# Patient Record
Sex: MALE | Race: WHITE | HISPANIC OR LATINO | Employment: FULL TIME | URBAN - NONMETROPOLITAN AREA
[De-identification: names, ages, dates, MRNs, and addresses within clinical notes are randomized per-mention and may not be internally consistent; named-entity substitution may affect disease eponyms.]

---

## 2017-01-27 RX ORDER — INSULIN ASPART 100 [IU]/ML
INJECTION, SOLUTION INTRAVENOUS; SUBCUTANEOUS
Qty: 3 PEN | Refills: 11 | Status: SHIPPED | OUTPATIENT
Start: 2017-01-27 | End: 2017-02-01 | Stop reason: SDUPTHER

## 2017-02-24 ENCOUNTER — OFFICE VISIT (OUTPATIENT)
Dept: INTERNAL MEDICINE | Facility: CLINIC | Age: 62
End: 2017-02-24

## 2017-02-24 VITALS
HEIGHT: 64 IN | HEART RATE: 116 BPM | SYSTOLIC BLOOD PRESSURE: 130 MMHG | BODY MASS INDEX: 25.44 KG/M2 | DIASTOLIC BLOOD PRESSURE: 86 MMHG | OXYGEN SATURATION: 98 % | WEIGHT: 149 LBS | TEMPERATURE: 97.6 F

## 2017-02-24 DIAGNOSIS — R05.9 COUGH: ICD-10-CM

## 2017-02-24 DIAGNOSIS — J18.9 PNEUMONIA OF BOTH LOWER LOBES DUE TO INFECTIOUS ORGANISM: Primary | ICD-10-CM

## 2017-02-24 LAB
EXPIRATION DATE: NORMAL
FLUAV AG NPH QL: NEGATIVE
FLUBV AG NPH QL: NEGATIVE
INTERNAL CONTROL: NORMAL
Lab: NORMAL

## 2017-02-24 PROCEDURE — 99213 OFFICE O/P EST LOW 20 MIN: CPT | Performed by: PHYSICIAN ASSISTANT

## 2017-02-24 PROCEDURE — 87804 INFLUENZA ASSAY W/OPTIC: CPT | Performed by: PHYSICIAN ASSISTANT

## 2017-02-24 RX ORDER — AZITHROMYCIN 250 MG/1
TABLET, FILM COATED ORAL
Qty: 6 TABLET | Refills: 0 | Status: SHIPPED | OUTPATIENT
Start: 2017-02-24 | End: 2018-02-15

## 2017-02-24 RX ORDER — BENZONATATE 200 MG/1
200 CAPSULE ORAL 3 TIMES DAILY PRN
Qty: 30 CAPSULE | Refills: 0 | Status: SHIPPED | OUTPATIENT
Start: 2017-02-24 | End: 2018-02-15

## 2017-02-24 NOTE — PROGRESS NOTES
Pascual Kaur is a 61 y.o. male.     Subjective   History of Present Illness   Here today with report of cough for 2 weeks with intermittent fever. Getting SOA with coughing fits. Has also had sore throat, headaches, body aches, chest soreness and nausea.       The following portions of the patient's history were reviewed and updated as appropriate: allergies, current medications, past family history, past medical history, past social history, past surgical history and problem list.    Review of Systems    Constitutional: Fever. Fatigue. Negative for appetite change, chills and unexpected weight change.   HENT: sore throat, congestion. Negative forear pain, hearing loss, nosebleeds, postnasal drip, rhinorrhea,  tinnitus and trouble swallowing.    Eyes: Negative for photophobia, discharge and visual disturbance.   Respiratory: cough, SOA with coughing fits. Negative for chest tightness and wheezing.    Cardiovascular: Negative for chest pain, palpitations and leg swelling.   Gastrointestinal: Negative for abdominal distention, abdominal pain, blood in stool, constipation, diarrhea, nausea and vomiting.   Endocrine: Negative for cold intolerance, heat intolerance, polydipsia, polyphagia and polyuria.   Musculoskeletal: Negative for arthralgias, back pain, joint swelling, myalgias, neck pain and neck stiffness.   Skin: Negative for color change, pallor, rash and wound.   Allergic/Immunologic: Negative for environmental allergies, food allergies and immunocompromised state.   Neurological: Headaches. Negative for dizziness, tremors, seizures, weakness, numbness.  Hematological: Negative for adenopathy. Does not bruise/bleed easily.   Psychiatric/Behavioral: Negative for agitation, behavioral problems, confusion, hallucinations, self-injury and suicidal ideas. The patient is not nervous/anxious.      Objective    Physical Exam  Constitutional: Oriented to person, place, and time. Appears well-developed and  "well-nourished.   HENT: OP normal. TMs normal.   Head: Normocephalic and atraumatic.   Eyes: EOM are normal. Pupils are equal, round, and reactive to light.   Neck: Normal range of motion. Neck supple.   Cardiovascular: Normal rate, regular rhythm and normal heart sounds.    Pulmonary/Chest: Coarse breath sounds with wheezing in apices and few coarse crackles bilateral bases. Effort normal.  No respiratory distress.  Has no rales. Exhibits no chest wall tenderness.   Abdominal: Soft. Bowel sounds are normal. Exhibits no distension and no mass. There is no tenderness.   Musculoskeletal: Normal range of motion. Exhibits no tenderness.   Neurological: Alert and oriented to person, place, and time.   Skin: Skin is warm and dry.   Psychiatric: Has a normal mood and affect. Behavior is normal. Judgment and thought content normal.       Visit Vitals   • /86   • Pulse 116   • Temp 97.6 °F (36.4 °C)   • Ht 64\" (162.6 cm)   • Wt 149 lb (67.6 kg)   • SpO2 98%   • BMI 25.58 kg/m2       Nursing note and vitals reviewed.        Assessment/Plan   Pascual was seen today for cough and fever.    Diagnoses and all orders for this visit:    Pneumonia of both lower lobes due to infectious organism  -     azithromycin (ZITHROMAX Z-DAVID) 250 MG tablet; Take 2 tablets the first day, then 1 tablet daily for 4 days.  -     benzonatate (TESSALON) 200 MG capsule; Take 1 capsule by mouth 3 (Three) Times a Day As Needed for cough.    Dulera 200 mcg/5mcg sample inhaler provided with instructions on use and since/spit after each use. Use for 1 week. Call Tuesday if no improvement.   Influenza A&B negative.            "

## 2017-02-28 ENCOUNTER — TELEPHONE (OUTPATIENT)
Dept: INTERNAL MEDICINE | Facility: CLINIC | Age: 62
End: 2017-02-28

## 2017-02-28 NOTE — TELEPHONE ENCOUNTER
----- Message from eRina Sanders sent at 2/28/2017  3:08 PM EST -----  Contact: Patient   Patient called the office requesting to see if Maura could write him an work excuse for this past Sunday. He said that he was seen Friday and did not improve. You can contact them back at [phone].

## 2017-07-11 RX ORDER — INSULIN DETEMIR 100 [IU]/ML
INJECTION, SOLUTION SUBCUTANEOUS
Qty: 7 PEN | Refills: 3 | Status: SHIPPED | OUTPATIENT
Start: 2017-07-11 | End: 2018-01-04 | Stop reason: SDUPTHER

## 2017-07-13 ENCOUNTER — TELEPHONE (OUTPATIENT)
Dept: INTERNAL MEDICINE | Facility: CLINIC | Age: 62
End: 2017-07-13

## 2017-07-13 RX ORDER — OMEPRAZOLE 40 MG/1
40 CAPSULE, DELAYED RELEASE ORAL DAILY
Qty: 30 CAPSULE | Refills: 11 | Status: SHIPPED | OUTPATIENT
Start: 2017-07-13 | End: 2018-08-30 | Stop reason: SDUPTHER

## 2018-02-08 RX ORDER — INSULIN DETEMIR 100 [IU]/ML
INJECTION, SOLUTION SUBCUTANEOUS
Refills: 0 | OUTPATIENT
Start: 2018-02-08

## 2018-02-13 RX ORDER — INSULIN DETEMIR 100 [IU]/ML
INJECTION, SOLUTION SUBCUTANEOUS
Qty: 1 PEN | Refills: 0 | Status: SHIPPED | OUTPATIENT
Start: 2018-02-13 | End: 2018-02-15 | Stop reason: SDUPTHER

## 2018-02-15 ENCOUNTER — OFFICE VISIT (OUTPATIENT)
Dept: INTERNAL MEDICINE | Facility: CLINIC | Age: 63
End: 2018-02-15

## 2018-02-15 VITALS
OXYGEN SATURATION: 97 % | BODY MASS INDEX: 25.78 KG/M2 | TEMPERATURE: 97.6 F | WEIGHT: 151 LBS | SYSTOLIC BLOOD PRESSURE: 120 MMHG | HEIGHT: 64 IN | DIASTOLIC BLOOD PRESSURE: 80 MMHG | HEART RATE: 119 BPM

## 2018-02-15 DIAGNOSIS — J41.0 SIMPLE CHRONIC BRONCHITIS (HCC): Primary | ICD-10-CM

## 2018-02-15 DIAGNOSIS — E10.42 TYPE 1 DIABETES MELLITUS WITH DIABETIC POLYNEUROPATHY (HCC): ICD-10-CM

## 2018-02-15 DIAGNOSIS — Z23 NEED FOR VACCINATION: ICD-10-CM

## 2018-02-15 DIAGNOSIS — C34.11 MALIGNANT NEOPLASM OF UPPER LOBE OF RIGHT LUNG (HCC): ICD-10-CM

## 2018-02-15 PROCEDURE — 90471 IMMUNIZATION ADMIN: CPT | Performed by: INTERNAL MEDICINE

## 2018-02-15 PROCEDURE — 90662 IIV NO PRSV INCREASED AG IM: CPT | Performed by: INTERNAL MEDICINE

## 2018-02-15 PROCEDURE — 99214 OFFICE O/P EST MOD 30 MIN: CPT | Performed by: INTERNAL MEDICINE

## 2018-02-15 NOTE — PROGRESS NOTES
"Subjective  Pascual Kaur is a 62 y.o. male    HPI coming in for follow-up he is accompanied by his wife was giving us some of the history. Very poor compliance with follow-ups here does not take his short-acting insulin. Is only taking Levemir last A1c was more than 11. He denies polyuria polydipsia. Has had history of lung CA now in remission. No alcohol or tobacco use    The following portions of the patient's history were reviewed and updated as appropriate: allergies, current medications, past family history, past medical history, past social history, past surgical history, and problem list.     Review of Systems   Constitutional: Negative.  Negative for activity change, appetite change, fatigue and fever.   HENT: Negative for congestion, ear discharge, ear pain and trouble swallowing.    Eyes: Negative for photophobia and visual disturbance.   Respiratory: Positive for cough and shortness of breath.    Cardiovascular: Negative for chest pain and palpitations.   Gastrointestinal: Negative for abdominal distention, abdominal pain, constipation, diarrhea, nausea and vomiting.   Endocrine: Negative.    Genitourinary: Negative for dysuria, hematuria and urgency.   Musculoskeletal: Positive for arthralgias. Negative for back pain, joint swelling and myalgias.   Skin: Negative for color change and rash.   Allergic/Immunologic: Negative.    Neurological: Positive for numbness. Negative for dizziness, weakness, light-headedness and headaches.   Hematological: Negative for adenopathy. Does not bruise/bleed easily.   Psychiatric/Behavioral: Negative for agitation, confusion and dysphoric mood. The patient is not nervous/anxious.        Visit Vitals   • /80   • Pulse 119   • Temp 97.6 °F (36.4 °C)   • Ht 162.6 cm (64\")   • Wt 68.5 kg (151 lb)   • SpO2 97%   • BMI 25.92 kg/m2       Objective  Physical Exam   Constitutional: He is oriented to person, place, and time. He appears well-developed and well-nourished. " No distress.   HENT:   Nose: Nose normal.   Mouth/Throat: Oropharynx is clear and moist.   Eyes: Conjunctivae and EOM are normal. No scleral icterus.   Neck: No tracheal deviation present. No thyromegaly present.   Cardiovascular: Normal rate and regular rhythm.  Exam reveals no friction rub.    No murmur heard.  Pulmonary/Chest: No respiratory distress. He has no wheezes. He has no rales.   Abdominal: Soft. He exhibits no distension and no mass. There is no tenderness. There is no guarding.   Musculoskeletal: Normal range of motion. He exhibits deformity.    Pascual had a diabetic foot exam performed today.    Vascular Status -  His exam exhibits right foot vasculature normal. His exam exhibits no right foot edema. His exam exhibits left foot vasculature normal. His exam exhibits no left foot edema.   Skin Integrity  -  His right foot skin is intact.     Pascual 's left foot skin is intact. .  Lymphadenopathy:     He has no cervical adenopathy.   Neurological: He is alert and oriented to person, place, and time. He has normal reflexes. No cranial nerve deficit. Coordination normal.   Skin: Skin is warm and dry. No rash noted. No erythema.   Psychiatric: He has a normal mood and affect. His behavior is normal. Judgment and thought content normal.       Diagnoses and all orders for this visit:    Simple chronic bronchitis stable continue with albuterol when necessary only no recent exacerbation    Malignant neoplasm of upper lobe of right lung stable symptom-free    Type 1 diabetes mellitus with diabetic polyneuropathy counseled in detail check lab work discussed need to take rapid acting insulin with meals may need to titrate dose of Levemir also. Prescription called in follow Accu-Cheks at home

## 2018-02-16 LAB
ALBUMIN SERPL-MCNC: 4.4 G/DL (ref 3.5–5)
ALBUMIN/GLOB SERPL: 1.3 G/DL (ref 1–2)
ALP SERPL-CCNC: 346 U/L (ref 38–126)
ALT SERPL-CCNC: 42 U/L (ref 13–69)
AST SERPL-CCNC: 29 U/L (ref 15–46)
BILIRUB SERPL-MCNC: 0.5 MG/DL (ref 0.2–1.3)
BUN SERPL-MCNC: 16 MG/DL (ref 7–20)
BUN/CREAT SERPL: 20 (ref 6.3–21.9)
CALCIUM SERPL-MCNC: 10 MG/DL (ref 8.4–10.2)
CHLORIDE SERPL-SCNC: 97 MMOL/L (ref 98–107)
CO2 SERPL-SCNC: 23 MMOL/L (ref 26–30)
CREAT SERPL-MCNC: 0.8 MG/DL (ref 0.6–1.3)
GFR SERPLBLD CREATININE-BSD FMLA CKD-EPI: 119 ML/MIN/1.73
GFR SERPLBLD CREATININE-BSD FMLA CKD-EPI: 98 ML/MIN/1.73
GLOBULIN SER CALC-MCNC: 3.5 GM/DL
GLUCOSE SERPL-MCNC: 442 MG/DL (ref 74–98)
HBA1C MFR BLD: 12.6 %
LDLC SERPL DIRECT ASSAY-MCNC: 133 MG/DL (ref 0–99)
POTASSIUM SERPL-SCNC: 4.8 MMOL/L (ref 3.5–5.1)
PROT SERPL-MCNC: 7.9 G/DL (ref 6.3–8.2)
SODIUM SERPL-SCNC: 137 MMOL/L (ref 137–145)
UNABLE TO VOID: NORMAL

## 2018-02-16 RX ORDER — ATENOLOL 25 MG/1
25 TABLET ORAL DAILY
Qty: 90 TABLET | Refills: 3 | Status: SHIPPED | OUTPATIENT
Start: 2018-02-16 | End: 2019-04-02 | Stop reason: SDUPTHER

## 2018-02-16 RX ORDER — ATORVASTATIN CALCIUM 20 MG/1
20 TABLET, FILM COATED ORAL DAILY
Qty: 90 TABLET | Refills: 3 | Status: SHIPPED | OUTPATIENT
Start: 2018-02-16 | End: 2019-04-02 | Stop reason: SDUPTHER

## 2018-03-13 ENCOUNTER — TELEPHONE (OUTPATIENT)
Dept: INTERNAL MEDICINE | Facility: CLINIC | Age: 63
End: 2018-03-13

## 2018-03-13 DIAGNOSIS — E10.42 TYPE 1 DIABETES MELLITUS WITH DIABETIC POLYNEUROPATHY (HCC): Primary | ICD-10-CM

## 2018-05-21 ENCOUNTER — OFFICE VISIT (OUTPATIENT)
Dept: INTERNAL MEDICINE | Facility: CLINIC | Age: 63
End: 2018-05-21

## 2018-05-21 VITALS
SYSTOLIC BLOOD PRESSURE: 120 MMHG | DIASTOLIC BLOOD PRESSURE: 70 MMHG | HEIGHT: 64 IN | HEART RATE: 100 BPM | BODY MASS INDEX: 26.8 KG/M2 | OXYGEN SATURATION: 98 % | WEIGHT: 157 LBS | TEMPERATURE: 97.6 F

## 2018-05-21 DIAGNOSIS — C34.11 MALIGNANT NEOPLASM OF UPPER LOBE OF RIGHT LUNG (HCC): Primary | ICD-10-CM

## 2018-05-21 DIAGNOSIS — E10.42 TYPE 1 DIABETES MELLITUS WITH DIABETIC POLYNEUROPATHY (HCC): ICD-10-CM

## 2018-05-21 DIAGNOSIS — J41.0 SIMPLE CHRONIC BRONCHITIS (HCC): ICD-10-CM

## 2018-05-21 LAB — HBA1C MFR BLD: 11.8 %

## 2018-05-21 PROCEDURE — 99214 OFFICE O/P EST MOD 30 MIN: CPT | Performed by: INTERNAL MEDICINE

## 2018-05-21 RX ORDER — MELOXICAM 15 MG/1
15 TABLET ORAL DAILY PRN
Qty: 30 TABLET | Refills: 2 | Status: SHIPPED | OUTPATIENT
Start: 2018-05-21 | End: 2019-07-28 | Stop reason: SDUPTHER

## 2018-05-21 NOTE — PROGRESS NOTES
"Subjective  Pascual Kaur is a 62 y.o. male    HPI coming in for follow-up he is accompanied by his wife was giving us some of the history patient with lung CA currently stable poorly controlled diabetes on insulin regimen. Has hypertension with dyslipidemia no new complaints    The following portions of the patient's history were reviewed and updated as appropriate: allergies, current medications, past family history, past medical history, past social history, past surgical history, and problem list.     Review of Systems   Constitutional: Positive for fatigue. Negative for activity change, appetite change and fever.   HENT: Negative for congestion, ear discharge, ear pain and trouble swallowing.    Eyes: Negative for photophobia and visual disturbance.   Respiratory: Negative for cough and shortness of breath.    Cardiovascular: Negative for chest pain and palpitations.   Gastrointestinal: Negative for abdominal distention, abdominal pain, constipation, diarrhea, nausea and vomiting.   Endocrine: Negative.    Genitourinary: Negative for dysuria, hematuria and urgency.   Musculoskeletal: Negative for arthralgias, back pain, joint swelling and myalgias.   Skin: Negative for color change and rash.   Allergic/Immunologic: Negative.    Neurological: Positive for numbness. Negative for dizziness, weakness, light-headedness and headaches.   Hematological: Negative for adenopathy. Does not bruise/bleed easily.   Psychiatric/Behavioral: Negative for agitation, confusion and dysphoric mood. The patient is not nervous/anxious.        Visit Vitals  /70   Pulse 100   Temp 97.6 °F (36.4 °C)   Ht 162.6 cm (64\")   Wt 71.2 kg (157 lb)   SpO2 98%   BMI 26.95 kg/m²       Objective  Physical Exam   Constitutional: He is oriented to person, place, and time. He appears well-developed and well-nourished. No distress.   HENT:   Nose: Nose normal.   Mouth/Throat: Oropharynx is clear and moist.   Eyes: Conjunctivae and EOM are " normal. No scleral icterus.   Neck: No tracheal deviation present. No thyromegaly present.   Cardiovascular: Normal rate and regular rhythm.  Exam reveals no friction rub.    No murmur heard.  Pulmonary/Chest: No respiratory distress. He has no wheezes. He has no rales.   Abdominal: Soft. He exhibits no distension and no mass. There is no tenderness. There is no guarding.   Musculoskeletal: Normal range of motion. He exhibits no deformity.   Lymphadenopathy:     He has no cervical adenopathy.   Neurological: He is alert and oriented to person, place, and time. He has normal reflexes. No cranial nerve deficit. Coordination normal.   Skin: Skin is warm and dry. No rash noted. No erythema.   Psychiatric: He has a normal mood and affect. His behavior is normal. Judgment and thought content normal.       Diagnoses and all orders for this visit:    Malignant neoplasm of upper lobe of right lung stable following up with oncology no cough or hemoptysis or significant shortness of breath off tobacco use    Simple chronic bronchitis stable    Type 1 diabetes mellitus with diabetic polyneuropathy check A1c titrate dose accordingly discussed diet with patient again    Other orders  -     glucose blood (ACCU-CHEK TIFFANY PLUS) test strip; TEST GLUCOSE THREE TIMES DAILY FOR DIABETES. E11.9

## 2018-08-31 RX ORDER — OMEPRAZOLE 40 MG/1
CAPSULE, DELAYED RELEASE ORAL
Qty: 30 CAPSULE | Refills: 11 | Status: SHIPPED | OUTPATIENT
Start: 2018-08-31 | End: 2019-11-01 | Stop reason: SDUPTHER

## 2019-04-02 RX ORDER — ATORVASTATIN CALCIUM 20 MG/1
TABLET, FILM COATED ORAL
Qty: 30 TABLET | Refills: 0 | Status: SHIPPED | OUTPATIENT
Start: 2019-04-02 | End: 2019-06-01 | Stop reason: SDUPTHER

## 2019-04-02 RX ORDER — ATENOLOL 25 MG/1
TABLET ORAL
Qty: 30 TABLET | Refills: 0 | Status: SHIPPED | OUTPATIENT
Start: 2019-04-02 | End: 2019-06-01 | Stop reason: SDUPTHER

## 2019-05-02 DIAGNOSIS — E10.42 TYPE 1 DIABETES MELLITUS WITH DIABETIC POLYNEUROPATHY (HCC): ICD-10-CM

## 2019-05-02 RX ORDER — INSULIN DETEMIR 100 [IU]/ML
INJECTION, SOLUTION SUBCUTANEOUS
Qty: 15 PEN | Refills: 3 | Status: SHIPPED | OUTPATIENT
Start: 2019-05-02 | End: 2019-08-28

## 2019-06-01 RX ORDER — ATORVASTATIN CALCIUM 20 MG/1
TABLET, FILM COATED ORAL
Qty: 30 TABLET | Refills: 0 | Status: SHIPPED | OUTPATIENT
Start: 2019-06-01 | End: 2019-07-28 | Stop reason: SDUPTHER

## 2019-06-01 RX ORDER — ATENOLOL 25 MG/1
TABLET ORAL
Qty: 30 TABLET | Refills: 0 | Status: SHIPPED | OUTPATIENT
Start: 2019-06-01 | End: 2019-08-28

## 2019-07-29 RX ORDER — ATORVASTATIN CALCIUM 20 MG/1
20 TABLET, FILM COATED ORAL DAILY
Qty: 30 TABLET | Refills: 0 | Status: SHIPPED | OUTPATIENT
Start: 2019-07-29 | End: 2019-11-01 | Stop reason: SDUPTHER

## 2019-07-29 RX ORDER — MELOXICAM 15 MG/1
TABLET ORAL
Qty: 30 TABLET | Refills: 0 | Status: SHIPPED | OUTPATIENT
Start: 2019-07-29 | End: 2019-11-01 | Stop reason: SDUPTHER

## 2019-09-09 DIAGNOSIS — E10.42 TYPE 1 DIABETES MELLITUS WITH DIABETIC POLYNEUROPATHY (HCC): ICD-10-CM

## 2019-09-09 RX ORDER — INSULIN DETEMIR 100 [IU]/ML
INJECTION, SOLUTION SUBCUTANEOUS
Qty: 15 PEN | Refills: 1 | Status: SHIPPED | OUTPATIENT
Start: 2019-09-09 | End: 2020-09-08

## 2019-10-07 ENCOUNTER — OFFICE VISIT (OUTPATIENT)
Dept: INTERNAL MEDICINE | Facility: CLINIC | Age: 64
End: 2019-10-07

## 2019-10-07 VITALS
SYSTOLIC BLOOD PRESSURE: 123 MMHG | DIASTOLIC BLOOD PRESSURE: 70 MMHG | HEIGHT: 64 IN | WEIGHT: 158 LBS | OXYGEN SATURATION: 96 % | HEART RATE: 101 BPM | BODY MASS INDEX: 26.98 KG/M2 | TEMPERATURE: 98 F

## 2019-10-07 DIAGNOSIS — E10.42 TYPE 1 DIABETES MELLITUS WITH DIABETIC POLYNEUROPATHY (HCC): Primary | ICD-10-CM

## 2019-10-07 DIAGNOSIS — J41.0 SIMPLE CHRONIC BRONCHITIS (HCC): ICD-10-CM

## 2019-10-07 DIAGNOSIS — C34.11 MALIGNANT NEOPLASM OF UPPER LOBE OF RIGHT LUNG (HCC): ICD-10-CM

## 2019-10-07 PROCEDURE — 99214 OFFICE O/P EST MOD 30 MIN: CPT | Performed by: INTERNAL MEDICINE

## 2019-10-07 NOTE — PROGRESS NOTES
"Subjective  Pascual Kaur is a 64 y.o. male    HPI coming in for follow-up patient with diabetes with very poor control.  He is on basal insulin however does not manage to take NovoLog before meals all the time.  He is squeamish about giving himself injections.  He is able to get the NovoLog injections when his wife is at home.  He denies chest pain or shortness of breath history of lung CA status post resection has been off cigarette smoking for more than 5 years now.    The following portions of the patient's history were reviewed and updated as appropriate: allergies, current medications, past family history, past medical history, past social history, past surgical history, and problem list.     Review of Systems   Constitutional: Positive for fatigue. Negative for activity change, appetite change and fever.   HENT: Negative for congestion, ear discharge, ear pain and trouble swallowing.    Eyes: Positive for visual disturbance. Negative for photophobia.   Respiratory: Negative for cough and shortness of breath.    Cardiovascular: Negative for chest pain and palpitations.   Gastrointestinal: Negative for abdominal distention, abdominal pain, constipation, diarrhea, nausea and vomiting.   Endocrine: Negative.    Genitourinary: Negative for dysuria, hematuria and urgency.   Musculoskeletal: Positive for arthralgias. Negative for back pain, joint swelling and myalgias.   Skin: Negative for color change and rash.   Allergic/Immunologic: Negative.    Neurological: Negative for dizziness, weakness, light-headedness and headaches.   Hematological: Negative for adenopathy. Does not bruise/bleed easily.   Psychiatric/Behavioral: Negative for agitation, confusion and dysphoric mood. The patient is not nervous/anxious.        Visit Vitals  /70 Comment: Automatic   Pulse 101   Temp 98 °F (36.7 °C) (Temporal)   Ht 162.6 cm (64\")   Wt 71.7 kg (158 lb)   SpO2 96%   BMI 27.12 kg/m²       Objective  Physical Exam "   Constitutional: He is oriented to person, place, and time. He appears well-developed and well-nourished. No distress.   HENT:   Nose: Nose normal.   Mouth/Throat: Oropharynx is clear and moist.   Eyes: Conjunctivae and EOM are normal. No scleral icterus.   Neck: No tracheal deviation present. No thyromegaly present.   Cardiovascular: Normal rate and regular rhythm. Exam reveals no friction rub.   No murmur heard.  Pulmonary/Chest: No respiratory distress. He has no wheezes. He has no rales.   Abdominal: Soft. He exhibits no distension and no mass. There is no tenderness. There is no guarding.   Musculoskeletal: Normal range of motion. He exhibits no deformity.   Lymphadenopathy:     He has no cervical adenopathy.   Neurological: He is alert and oriented to person, place, and time. He has normal reflexes. No cranial nerve deficit. Coordination normal.   Skin: Skin is warm and dry. No rash noted. No erythema.   Psychiatric: He has a normal mood and affect. His behavior is normal. Judgment and thought content normal.       Diagnoses and all orders for this visit:    Type 1 diabetes mellitus with diabetic polyneuropathy (CMS/HCC) discussed exercise program check lab work consider adjustment in insulin dosing if not better.      Simple chronic bronchitis (CMS/HCC) stable no recent exacerbation    Malignant neoplasm of upper lobe of right lung (CMS/HCC) stable symptom-free

## 2019-10-08 LAB
ALBUMIN SERPL-MCNC: 4.1 G/DL (ref 3.6–4.8)
ALBUMIN/CREAT UR: 90.9 MG/G CREAT (ref 0–30)
ALBUMIN/GLOB SERPL: 1.4 {RATIO} (ref 1.2–2.2)
ALP SERPL-CCNC: 234 IU/L (ref 39–117)
ALT SERPL-CCNC: 28 IU/L (ref 0–44)
AST SERPL-CCNC: 22 IU/L (ref 0–40)
BILIRUB SERPL-MCNC: 0.4 MG/DL (ref 0–1.2)
BUN SERPL-MCNC: 14 MG/DL (ref 8–27)
BUN/CREAT SERPL: 18 (ref 10–24)
CALCIUM SERPL-MCNC: 9.4 MG/DL (ref 8.6–10.2)
CHLORIDE SERPL-SCNC: 96 MMOL/L (ref 96–106)
CHOLEST SERPL-MCNC: 175 MG/DL (ref 100–199)
CO2 SERPL-SCNC: 24 MMOL/L (ref 20–29)
CREAT SERPL-MCNC: 0.78 MG/DL (ref 0.76–1.27)
CREAT UR-MCNC: 55.2 MG/DL
GLOBULIN SER CALC-MCNC: 2.9 G/DL (ref 1.5–4.5)
GLUCOSE SERPL-MCNC: 277 MG/DL (ref 65–99)
HBA1C MFR BLD: 11.7 % (ref 4.8–5.6)
HDLC SERPL-MCNC: 30 MG/DL
LDLC SERPL CALC-MCNC: 107 MG/DL (ref 0–99)
MICROALBUMIN UR-MCNC: 50.2 UG/ML
POTASSIUM SERPL-SCNC: 4.8 MMOL/L (ref 3.5–5.2)
PROT SERPL-MCNC: 7 G/DL (ref 6–8.5)
SODIUM SERPL-SCNC: 136 MMOL/L (ref 134–144)
TRIGL SERPL-MCNC: 192 MG/DL (ref 0–149)
VLDLC SERPL CALC-MCNC: 38 MG/DL (ref 5–40)

## 2019-10-14 ENCOUNTER — TELEPHONE (OUTPATIENT)
Dept: INTERNAL MEDICINE | Facility: CLINIC | Age: 64
End: 2019-10-14

## 2019-10-15 ENCOUNTER — TELEPHONE (OUTPATIENT)
Dept: INTERNAL MEDICINE | Facility: CLINIC | Age: 64
End: 2019-10-15

## 2019-10-15 NOTE — TELEPHONE ENCOUNTER
Looks like his diabetes remains in poor control with A1C.  His urine testing does suggest that his kidneys have some diabetic effect and are leaking protein.  He should consider medications with Dr. Kumari.

## 2019-10-15 NOTE — TELEPHONE ENCOUNTER
Dr. Hannah     Do you care to review his labs and advise on what I should tell them?     Thanks in advance

## 2019-11-01 RX ORDER — ATORVASTATIN CALCIUM 20 MG/1
TABLET, FILM COATED ORAL
Qty: 30 TABLET | Refills: 0 | Status: SHIPPED | OUTPATIENT
Start: 2019-11-01 | End: 2020-01-10

## 2019-11-01 RX ORDER — MELOXICAM 15 MG/1
TABLET ORAL
Qty: 30 TABLET | Refills: 0 | Status: SHIPPED | OUTPATIENT
Start: 2019-11-01 | End: 2020-01-10

## 2019-11-01 RX ORDER — OMEPRAZOLE 40 MG/1
CAPSULE, DELAYED RELEASE ORAL
Qty: 30 CAPSULE | Refills: 11 | Status: SHIPPED | OUTPATIENT
Start: 2019-11-01 | End: 2020-11-23 | Stop reason: SDUPTHER

## 2020-01-10 RX ORDER — MELOXICAM 15 MG/1
TABLET ORAL
Qty: 30 TABLET | Refills: 5 | Status: SHIPPED | OUTPATIENT
Start: 2020-01-10 | End: 2020-10-05

## 2020-01-10 RX ORDER — ATORVASTATIN CALCIUM 20 MG/1
TABLET, FILM COATED ORAL
Qty: 30 TABLET | Refills: 5 | Status: SHIPPED | OUTPATIENT
Start: 2020-01-10 | End: 2020-11-23 | Stop reason: SDUPTHER

## 2020-02-14 ENCOUNTER — TELEPHONE (OUTPATIENT)
Dept: INTERNAL MEDICINE | Facility: CLINIC | Age: 65
End: 2020-02-14

## 2020-02-14 NOTE — TELEPHONE ENCOUNTER
Pt needs an order for glucose blood (ACCU-CHEK TIFFANY PLUS) test strip is sent to [facility] [address] [address]    Ph: [phone]  Fax: [phone]

## 2020-03-25 ENCOUNTER — TELEPHONE (OUTPATIENT)
Dept: INTERNAL MEDICINE | Facility: CLINIC | Age: 65
End: 2020-03-25

## 2020-03-25 NOTE — TELEPHONE ENCOUNTER
Pt's wife is calling states  is having  diarrhea  Then wont have a bowel movement 5-7 days. She states he had a temp of 93.8 this am. She thinks something is going on but not sure what to do     Please advise and give call [phone] (H)

## 2020-04-02 ENCOUNTER — TELEPHONE (OUTPATIENT)
Dept: INTERNAL MEDICINE | Facility: CLINIC | Age: 65
End: 2020-04-02

## 2020-04-09 ENCOUNTER — TELEPHONE (OUTPATIENT)
Dept: INTERNAL MEDICINE | Facility: CLINIC | Age: 65
End: 2020-04-09

## 2020-04-09 NOTE — TELEPHONE ENCOUNTER
PATIENTS WIFE CALLED AND STATED THE PATIENT NEEDS A LETTER SAYING WHAT HIS HEALTH ISSUES ARE. FOR HIM TO BE PUT ON ADMINISTRATION LEAVE.    PATIENTS WIFE CALL BACK [phone]    PLEASE ADVISE IF ANY QUESTIONS

## 2020-04-10 ENCOUNTER — TELEPHONE (OUTPATIENT)
Dept: INTERNAL MEDICINE | Facility: CLINIC | Age: 65
End: 2020-04-10

## 2020-04-10 NOTE — TELEPHONE ENCOUNTER
Patients wife called and requests a return call regarding adding patients COPD to a document being sent to his work for administrative leave.     Please return call and advise.

## 2020-04-10 NOTE — TELEPHONE ENCOUNTER
I have dictated the letter.  See if you can send it to them by my chart or print it and mail it to them

## 2020-06-29 ENCOUNTER — OFFICE VISIT (OUTPATIENT)
Dept: INTERNAL MEDICINE | Facility: CLINIC | Age: 65
End: 2020-06-29

## 2020-06-29 VITALS
TEMPERATURE: 97.7 F | HEIGHT: 64 IN | DIASTOLIC BLOOD PRESSURE: 60 MMHG | BODY MASS INDEX: 26.6 KG/M2 | SYSTOLIC BLOOD PRESSURE: 100 MMHG | WEIGHT: 155.8 LBS | HEART RATE: 103 BPM | OXYGEN SATURATION: 98 %

## 2020-06-29 DIAGNOSIS — E10.319 DIABETIC RETINOPATHY OF BOTH EYES ASSOCIATED WITH TYPE 1 DIABETES MELLITUS, MACULAR EDEMA PRESENCE UNSPECIFIED, UNSPECIFIED RETINOPATHY SEVERITY (HCC): ICD-10-CM

## 2020-06-29 DIAGNOSIS — Z23 NEED FOR VACCINATION: ICD-10-CM

## 2020-06-29 DIAGNOSIS — J41.0 SIMPLE CHRONIC BRONCHITIS (HCC): ICD-10-CM

## 2020-06-29 DIAGNOSIS — E10.42 TYPE 1 DIABETES MELLITUS WITH DIABETIC POLYNEUROPATHY (HCC): Primary | ICD-10-CM

## 2020-06-29 DIAGNOSIS — K21.9 GASTROESOPHAGEAL REFLUX DISEASE WITHOUT ESOPHAGITIS: ICD-10-CM

## 2020-06-29 PROCEDURE — 90670 PCV13 VACCINE IM: CPT | Performed by: INTERNAL MEDICINE

## 2020-06-29 PROCEDURE — 90471 IMMUNIZATION ADMIN: CPT | Performed by: INTERNAL MEDICINE

## 2020-06-29 PROCEDURE — 99214 OFFICE O/P EST MOD 30 MIN: CPT | Performed by: INTERNAL MEDICINE

## 2020-06-29 RX ORDER — ALBUTEROL SULFATE 90 UG/1
2 AEROSOL, METERED RESPIRATORY (INHALATION) EVERY 4 HOURS PRN
Qty: 1 INHALER | Refills: 5 | Status: SHIPPED | OUTPATIENT
Start: 2020-06-29 | End: 2022-10-03 | Stop reason: SDUPTHER

## 2020-06-29 RX ORDER — ESCITALOPRAM OXALATE 5 MG/1
5 TABLET ORAL DAILY
Qty: 30 TABLET | Refills: 5 | Status: SHIPPED | OUTPATIENT
Start: 2020-06-29 | End: 2021-05-06 | Stop reason: SDUPTHER

## 2020-06-29 NOTE — PROGRESS NOTES
Chief Complaint   Patient presents with   • Diabetes     fasting today for labs, discuss inhaler        Pascual Kaur is a 64 y.o. male and is here for a comprehensive physical exam. The patient reports problems - diabetes, htn, retinopathy, neuropathy.     History:  Erectile dysfunction  yes  Nocturia  yes      Do you take any herbs or supplements that were not prescribed by a doctor? no    Are you taking aspirin daily? restart asa 81 mg daily      Health Habits:  Dental Exam. unknown  Eye Exam. up to date  Exercise: 0 times/week.  Current exercise activities include: none    Health Maintenance   Topic Date Due   • ANNUAL PHYSICAL  07/08/1958   • TDAP/TD VACCINES (1 - Tdap) 07/08/1967   • PNEUMOCOCCAL VACCINE (19-64 MEDIUM RISK) (1 of 1 - PPSV23) 07/08/1975   • ZOSTER VACCINE (1 of 2) 07/08/2006   • HEPATITIS C SCREENING  05/02/2016   • COLONOSCOPY  05/02/2016   • DIABETIC FOOT EXAM  02/15/2019   • HEMOGLOBIN A1C  04/07/2020   • INFLUENZA VACCINE  08/01/2020   • URINE MICROALBUMIN  10/07/2020   • DIABETIC EYE EXAM  02/04/2021       PMH, PSH, SocHx, FamHx, Allergies, and Medications: Reviewed and updated in the Visit Navigator.     Allergies   Allergen Reactions   • Iodine GI Intolerance   • Other GI Intolerance     * IVP Dye     Past Medical History:   Diagnosis Date   • Bronchitis    • Elevated liver enzymes     Chronic elevated liver enzymes with history of elevated alkaline phosphate as well as ALT and AST.   • Fatigue     Generalized fatigue, nondescript, not new, not better with exercise, not worse with exercise, not better with rest   • History of chemotherapy     Received 1 course of adjuvant carboplatin and Taxotere. Then we started carbo.Taxol off a 3 on, 1 off weekly regimen but he did not tolerate either of those and subsequentlly refused further adjuvant therapy. On follow-up scanning since that time.   • History of CT scan of chest 06/19/2014    CT of chest noncontrast showed no evidence of  "recurrence. There was stable diffuse scarring in the right middle and upper lung consistent with postoperative changes and stable obstructive emphysematous changes.   • History of MRI     MRI of brain negative   • Lung cancer (CMS/HCC) 05/2011    Stage IIA, T2N1, non-small cell lung cancer, status post wedge resection of a 3 cm primary, level 10 node positive, preop PET negative, and MRI of brain negative lung cancer. Ineligible for our MORAB trial due to the wedge resection.  Diagnosis was in May 2011. - No recurrence.   • Sinusitis    • Stress     regarding his occupation   • Type 2 diabetes mellitus (CMS/HCC)     poorly controlled and noncompliant     No past surgical history on file.  Social History     Socioeconomic History   • Marital status:      Spouse name: Not on file   • Number of children: Not on file   • Years of education: Not on file   • Highest education level: Not on file   Tobacco Use   • Smoking status: Former Smoker   • Smokeless tobacco: Never Used   • Tobacco comment: \"He denies smoking.\"   Substance and Sexual Activity   • Alcohol use: No   • Drug use: No   • Sexual activity: Defer     Comment: .     No family history on file.    Review of Systems  Review of Systems    Vitals:    06/29/20 1131   BP: 100/60   Pulse: 103   Temp: 97.7 °F (36.5 °C)   SpO2: 98%       Objective   /60   Pulse 103   Temp 97.7 °F (36.5 °C) (Temporal)   Ht 162.6 cm (64\")   Wt 70.7 kg (155 lb 12.8 oz)   SpO2 98%   BMI 26.74 kg/m²     Physical Exam    Pascual had a diabetic foot exam performed today.    Neurological Sensory Findings -  Right ankle/foot altered proprioception and left ankle/foot altered proprioception.  Vascular Status -  His right foot exhibits normal foot vasculature  and no edema. His left foot exhibits normal foot vasculature  and no edema.  Skin Integrity  -  His right foot skin is intact.His left foot skin is intact..      The 10-year CVD risk score (SMOOTH'Rosanna et al., " 2008) is: 23.2%    Values used to calculate the score:      Age: 64 years      Sex: Male      Diabetic: Yes      Tobacco smoker: No      Systolic Blood Pressure: 100 mmHg      Is BP treated: No      HDL Cholesterol: 30 mg/dL      Total Cholesterol: 175 mg/dL    Lab Results   Component Value Date    CHLPL 175 10/07/2019    TRIG 192 (H) 10/07/2019    HDL 30 (L) 10/07/2019     (H) 10/07/2019     Glucose   Date Value Ref Range Status   03/07/2016 343 (H) 70 - 100 mg/dL Final     BUN   Date Value Ref Range Status   10/07/2019 14 8 - 27 mg/dL Final     Creatinine   Date Value Ref Range Status   10/07/2019 0.78 0.76 - 1.27 mg/dL Final     Sodium   Date Value Ref Range Status   10/07/2019 136 134 - 144 mmol/L Final     Potassium   Date Value Ref Range Status   10/07/2019 4.8 3.5 - 5.2 mmol/L Final     Chloride   Date Value Ref Range Status   10/07/2019 96 96 - 106 mmol/L Final     Total CO2   Date Value Ref Range Status   10/07/2019 24 20 - 29 mmol/L Final     Calcium   Date Value Ref Range Status   10/07/2019 9.4 8.6 - 10.2 mg/dL Final     Total Protein   Date Value Ref Range Status   03/07/2016 7.8 5.7 - 8.2 g/dL Final     Albumin   Date Value Ref Range Status   10/07/2019 4.1 3.6 - 4.8 g/dL Final     ALT (SGPT)   Date Value Ref Range Status   10/07/2019 28 0 - 44 IU/L Final     AST (SGOT)   Date Value Ref Range Status   10/07/2019 22 0 - 40 IU/L Final     Alkaline Phosphatase   Date Value Ref Range Status   10/07/2019 234 (H) 39 - 117 IU/L Final     Total Bilirubin   Date Value Ref Range Status   10/07/2019 0.4 0.0 - 1.2 mg/dL Final     eGFR Non  Am   Date Value Ref Range Status   10/07/2019 96 >59 mL/min/1.73 Final     A/G Ratio   Date Value Ref Range Status   10/07/2019 1.4 1.2 - 2.2 Final     BUN/Creatinine Ratio   Date Value Ref Range Status   10/07/2019 18 10 - 24 Final     Anion Gap   Date Value Ref Range Status   03/07/2016 10 3 - 11 mmol/L Final     Lab Results   Component Value Date    WBC 9.28  06/19/2014    HGB 16.1 06/19/2014    HCT 46.7 06/19/2014    MCV 83.7 06/19/2014     06/19/2014       Assessment/Plan   1. Healthy male exam.   2. Patient Counseling: Including but not Limited to the following, when appropriate:  --Nutrition: Stressed importance of moderation in sodium/caffeine intake, saturated fat and cholesterol, caloric balance, sufficient intake of fresh fruits, vegetables, fiber  .--Discussed the issue of daily use of baby aspirin.  --Exercise: Stressed the importance of regular exercise.   --Substance Abuse: Discussed cessation/primary prevention of tobacco, alcohol, or other drug use; driving or other dangerous activities under the influence; availability of treatment for abuse, as indicated based on social history.    --Sexuality: Discussed sexually transmitted diseases, partner selection, use of condoms and contraceptive alternatives.   --Injury prevention: Discussed safety belts, safety helmets, smoke detector, smoking near bedding or upholstery.   --Dental health: Discussed importance of regular tooth brushing, flossing, and dental visits.  --Immunizations reviewed.  --Discussed benefits of colon cancer screening.      3. Discussed the patient's BMI with him.  The BMI is in the acceptable range  4. No follow-ups on file.  5. Age-appropriate Screening Scheduled  6. There are no Patient Instructions on file for this visit.    Assessment/Plan     Pascual was seen today for diabetes.    Diagnoses and all orders for this visit:    Type 1 diabetes mellitus with diabetic polyneuropathy (CMS/HCC) continue with the dietary restrictions has basal insulin along with rapid acting insulin with meals check A1c    Simple chronic bronchitis (CMS/HCC) stable no recent exacerbation has been off tobacco for more than 5 years    Gastroesophageal reflux disease without esophagitis continue with reflux precautions and proton pump inhibitors    Diabetic retinopathy of both eyes associated with type 1  diabetes mellitus, macular edema presence unspecified, unspecified retinopathy severity (CMS/[facility]) referral back to retina specialist for bilateral retinopathy from diabetes

## 2020-06-30 LAB
ALBUMIN SERPL-MCNC: 4.2 G/DL (ref 3.5–5.2)
ALBUMIN/CREAT UR: 106 MG/G CREAT (ref 0–29)
ALBUMIN/GLOB SERPL: 1.3 G/DL
ALP SERPL-CCNC: 290 U/L (ref 39–117)
ALT SERPL-CCNC: 21 U/L (ref 1–41)
AST SERPL-CCNC: 17 U/L (ref 1–40)
BILIRUB SERPL-MCNC: 0.4 MG/DL (ref 0.2–1.2)
BUN SERPL-MCNC: 12 MG/DL (ref 8–23)
BUN/CREAT SERPL: 13.8 (ref 7–25)
CALCIUM SERPL-MCNC: 9.4 MG/DL (ref 8.6–10.5)
CHLORIDE SERPL-SCNC: 94 MMOL/L (ref 98–107)
CHOLEST SERPL-MCNC: 107 MG/DL (ref 0–200)
CO2 SERPL-SCNC: 29 MMOL/L (ref 22–29)
CREAT SERPL-MCNC: 0.87 MG/DL (ref 0.76–1.27)
CREAT UR-MCNC: 61.6 MG/DL
ERYTHROCYTE [DISTWIDTH] IN BLOOD BY AUTOMATED COUNT: 12.9 % (ref 12.3–15.4)
GLOBULIN SER CALC-MCNC: 3.2 GM/DL
GLUCOSE SERPL-MCNC: 358 MG/DL (ref 65–99)
HBA1C MFR BLD: 11.4 % (ref 4.8–5.6)
HCT VFR BLD AUTO: 48.3 % (ref 37.5–51)
HCV AB S/CO SERPL IA: 0.1 S/CO RATIO (ref 0–0.9)
HDLC SERPL-MCNC: 32 MG/DL (ref 40–60)
HGB BLD-MCNC: 16.1 G/DL (ref 13–17.7)
LDLC SERPL CALC-MCNC: 37 MG/DL (ref 0–100)
MCH RBC QN AUTO: 28.8 PG (ref 26.6–33)
MCHC RBC AUTO-ENTMCNC: 33.3 G/DL (ref 31.5–35.7)
MCV RBC AUTO: 86.4 FL (ref 79–97)
MICROALBUMIN UR-MCNC: 65 UG/ML
PLATELET # BLD AUTO: 270 10*3/MM3 (ref 140–450)
POTASSIUM SERPL-SCNC: 4.2 MMOL/L (ref 3.5–5.2)
PROT SERPL-MCNC: 7.4 G/DL (ref 6–8.5)
RBC # BLD AUTO: 5.59 10*6/MM3 (ref 4.14–5.8)
SODIUM SERPL-SCNC: 134 MMOL/L (ref 136–145)
TRIGL SERPL-MCNC: 191 MG/DL (ref 0–150)
VLDLC SERPL CALC-MCNC: 38.2 MG/DL
WBC # BLD AUTO: 8.42 10*3/MM3 (ref 3.4–10.8)

## 2020-07-01 DIAGNOSIS — E10.319 DIABETIC RETINOPATHY OF BOTH EYES ASSOCIATED WITH TYPE 1 DIABETES MELLITUS, MACULAR EDEMA PRESENCE UNSPECIFIED, UNSPECIFIED RETINOPATHY SEVERITY (HCC): Primary | ICD-10-CM

## 2020-07-01 DIAGNOSIS — E10.42 TYPE 1 DIABETES MELLITUS WITH DIABETIC POLYNEUROPATHY (HCC): ICD-10-CM

## 2020-08-19 ENCOUNTER — TELEPHONE (OUTPATIENT)
Dept: INTERNAL MEDICINE | Facility: CLINIC | Age: 65
End: 2020-08-19

## 2020-08-19 PROCEDURE — U0004 COV-19 TEST NON-CDC HGH THRU: HCPCS | Performed by: NURSE PRACTITIONER

## 2020-08-19 PROCEDURE — U0002 COVID-19 LAB TEST NON-CDC: HCPCS | Performed by: NURSE PRACTITIONER

## 2020-08-19 NOTE — TELEPHONE ENCOUNTER
PATIENT'S WIFE EMILEE CALLED AND SAID PATIENT WAS EXPOSED TO COVID THROUGH A CO-WORKER WHO'S WIFE TESTED POSITIVE.  PATIENT HAS HAD A SORE THROAT FOR A COUPLE OF DAYS.  HE STARTED RUNNING A FEVER YESTERDAY 8/18, AND YESTERDAY WAS HAVING FEVER,CHILLS, BODY ACHES, SORE THROAT, AND A LITTLE BIT OF A COUGH THAT IS PHLEGM PRODUCING.  HIS FEVER IS DOWN AND HE DID GO TO WORK THIS MORNING, BUT EMILEE WANTS TO KNOW IF SHE SHOULD HAVE HIM TESTED FOR COVID.  EMILEE IS CONCERNED SINCE PATIENT HAS DIABETES AND WEDGE OF HIS LUNG FROM CANCER.    PLEASE CONTACT EMILEE TO ADVISE.    CALLBACK:  [phone]

## 2020-09-08 ENCOUNTER — OFFICE VISIT (OUTPATIENT)
Dept: ENDOCRINOLOGY | Facility: CLINIC | Age: 65
End: 2020-09-08

## 2020-09-08 ENCOUNTER — TELEPHONE (OUTPATIENT)
Dept: ENDOCRINOLOGY | Facility: CLINIC | Age: 65
End: 2020-09-08

## 2020-09-08 ENCOUNTER — LAB (OUTPATIENT)
Dept: LAB | Facility: HOSPITAL | Age: 65
End: 2020-09-08

## 2020-09-08 VITALS
DIASTOLIC BLOOD PRESSURE: 68 MMHG | HEIGHT: 65 IN | WEIGHT: 149.6 LBS | SYSTOLIC BLOOD PRESSURE: 118 MMHG | BODY MASS INDEX: 24.93 KG/M2 | OXYGEN SATURATION: 98 % | HEART RATE: 104 BPM

## 2020-09-08 DIAGNOSIS — E10.69 TYPE 1 DIABETES MELLITUS WITH OTHER SPECIFIED COMPLICATION (HCC): Primary | ICD-10-CM

## 2020-09-08 LAB
EXPIRATION DATE: ABNORMAL
GLUCOSE BLDC GLUCOMTR-MCNC: 330 MG/DL (ref 70–130)
Lab: ABNORMAL

## 2020-09-08 PROCEDURE — 82947 ASSAY GLUCOSE BLOOD QUANT: CPT | Performed by: INTERNAL MEDICINE

## 2020-09-08 PROCEDURE — 84681 ASSAY OF C-PEPTIDE: CPT | Performed by: INTERNAL MEDICINE

## 2020-09-08 PROCEDURE — 99244 OFF/OP CNSLTJ NEW/EST MOD 40: CPT | Performed by: INTERNAL MEDICINE

## 2020-09-08 RX ORDER — BLOOD-GLUCOSE METER
1 KIT MISCELLANEOUS ONCE
Qty: 1 EACH | Refills: 0 | Status: SHIPPED | OUTPATIENT
Start: 2020-09-08 | End: 2020-09-08

## 2020-09-08 NOTE — TELEPHONE ENCOUNTER
Spoke with patient's wife, informed her that we added the medical history of patient's sister to his chart.  I will also let Dr. Jarquin know, that patient's sister has Hashimoto's thyroiditis

## 2020-09-08 NOTE — TELEPHONE ENCOUNTER
Please clarify what thyroid medication patient is referring to- there are not any listed in his chart or the reviewed medication list from earlier today.

## 2020-09-08 NOTE — TELEPHONE ENCOUNTER
PT'S WIFE CALLED--    THEY FORGOT TO MENTION THAT PT'S 'S SISTER HAS HASHIMOTO'S.    THEY DIDN'T KNOW IF IT WOULD AFFECT HIS THYROID MEDS OR NOT.

## 2020-09-08 NOTE — PROGRESS NOTES
Chief Complaint   Patient presents with   • Establish Care   • Diabetes        New patient who is being seen in consultation regarding uncontrolled diabetes at the request of Humberto Kumari MD     HPI   Pascual Kaur is a 65 y.o. male who presents for evaluation of diabetes.    Patient has a history of  diabetes. Paitenbettye This was initially diagnosed approximately 20 years ago. Patient was initially started on metformin but was transitioned to insulin a few years ago.  Patient reports he was transitioned to insulin during hospitalization when he was acutely hyperglycemic.  He reports he has since then been told that he has type 1 diabetes but is unsure how this diagnosis was made. Patient is currently taking 70 units of levemir and 15-10 units of novolog with meals.  This regimen was last changed last year.  Patient believes that glycemic control is poor.  Patient reports most recent A1c was elevated. Patient reports poor adherence to medications. Patient reports rare missed doses of levemir. He reports missing doses of novolog due to inability to inject insulin himself. Generally only takes one dose per day.  Patient denies any history of diabetic ketoacidosis.    Patient denies hypoglycemia or symptoms concerning for hypoglycemia.  Patient monitors blood glucose a few times per week.  Patient does not follow a diabetic diet.  Patient does not exercise regularly.    Patient's last dilated eye exam was within the past year.  Patient reports poor vision, no acute change.  Patient denies foot related concerns such as numbness, tingling, or pain.  History of dyslipidemia: Yes  History of renal dysfunction: No    Past Medical History:   Diagnosis Date   • Bronchitis    • COPD (chronic obstructive pulmonary disease) (CMS/HCC)    • Diabetes mellitus type I (CMS/HCC)    • Elevated liver enzymes     Chronic elevated liver enzymes with history of elevated alkaline phosphate as well as ALT and AST.   • Fatigue      "Generalized fatigue, nondescript, not new, not better with exercise, not worse with exercise, not better with rest   • History of chemotherapy     Received 1 course of adjuvant carboplatin and Taxotere. Then we started carbo.Taxol off a 3 on, 1 off weekly regimen but he did not tolerate either of those and subsequentlly refused further adjuvant therapy. On follow-up scanning since that time.   • History of CT scan of chest 06/19/2014    CT of chest noncontrast showed no evidence of recurrence. There was stable diffuse scarring in the right middle and upper lung consistent with postoperative changes and stable obstructive emphysematous changes.   • History of MRI     MRI of brain negative   • Lung cancer (CMS/[facility]) 05/2011    Stage IIA, T2N1, non-small cell lung cancer, status post wedge resection of a 3 cm primary, level 10 node positive, preop PET negative, and MRI of brain negative lung cancer. Ineligible for our MORAB trial due to the wedge resection.  Diagnosis was in May 2011. - No recurrence.   • Sinusitis    • Stress     regarding his occupation     Past Surgical History:   Procedure Laterality Date   • KIDNEY STONE SURGERY     • LUNG CANCER SURGERY        Family History   Problem Relation Age of Onset   • Diabetes Mother       Social History     Socioeconomic History   • Marital status:      Spouse name: Not on file   • Number of children: Not on file   • Years of education: Not on file   • Highest education level: Not on file   Tobacco Use   • Smoking status: Former Smoker     Packs/day: 3.00     Years: 37.00     Pack years: 111.00   • Smokeless tobacco: Never Used   • Tobacco comment: \"He denies smoking.\"   Substance and Sexual Activity   • Alcohol use: No   • Drug use: No   • Sexual activity: Defer     Comment: .      Allergies   Allergen Reactions   • Iodine GI Intolerance   • Other GI Intolerance     * IVP Dye      Current Outpatient Medications on File Prior to Visit   Medication Sig " Dispense Refill   • albuterol sulfate  (90 Base) MCG/ACT inhaler Inhale 2 puffs Every 4 (Four) Hours As Needed for Wheezing. (Patient taking differently: Inhale 2 puffs As Needed for Wheezing.) 1 inhaler 5   • aspirin 81 MG chewable tablet Chew 81 mg Daily.     • atorvastatin (LIPITOR) 20 MG tablet TAKE 1 TABLET BY MOUTH ONCE DAILY . APPOINTMENT REQUIRED FOR FUTURE REFILLS (Patient taking differently: Take 20 mg by mouth Daily.) 30 tablet 5   • escitalopram (Lexapro) 5 MG tablet Take 1 tablet by mouth Daily. 30 tablet 5   • glucose blood (ACCU-CHEK TIFFANY PLUS) test strip TEST GLUCOSE THREE TIMES DAILY FOR DIABETES. E11.9 100 each 11   • Homeopathic Products (ALLERGY MEDICINE PO) Take 1 tablet by mouth daily.     • meloxicam (MOBIC) 15 MG tablet TAKE 1 TABLET BY MOUTH ONCE DAILY AS NEEDED FOR MODERATE PAIN (Patient taking differently: Take 15 mg by mouth Daily.) 30 tablet 5   • omeprazole (priLOSEC) 40 MG capsule TAKE 1 CAPSULE BY MOUTH ONCE DAILY (Patient taking differently: Take 40 mg by mouth Daily.) 30 capsule 11   • [DISCONTINUED] insulin aspart (NOVOLOG FLEXPEN) 100 UNIT/ML solution pen-injector sc pen INJECT 15 UNITS tid (Patient taking differently: Inject 15 Units under the skin into the appropriate area as directed 3 (Three) Times a Day. INJECT 15 UNITS tid) 15 pen 11   • [DISCONTINUED] LEVEMIR FLEXTOUCH 100 UNIT/ML injection INJECT 70 UNITS SUBCUTANEOUSLY ONCE DAILY (Patient taking differently: Inject 70 Units under the skin into the appropriate area as directed Daily.) 15 pen 1     No current facility-administered medications on file prior to visit.         Review of Systems   Constitutional: Negative for fatigue, unexpected weight gain and unexpected weight loss.   HENT: Negative for trouble swallowing and voice change.    Eyes: Positive for visual disturbance. Negative for pain.   Respiratory: Negative for cough and shortness of breath.    Cardiovascular: Negative for chest pain and palpitations.  "  Gastrointestinal: Negative for constipation and diarrhea.   Endocrine: Negative for polydipsia and polyuria.   Musculoskeletal: Negative for arthralgias and myalgias.   Skin: Negative for rash and skin lesions.   Neurological: Negative for tremors and headache.   Psychiatric/Behavioral: Negative for sleep disturbance. The patient is nervous/anxious.         Vitals:    09/08/20 1110   BP: 118/68   Pulse: 104   SpO2: 98%   Weight: 67.9 kg (149 lb 9.6 oz)   Height: 165.1 cm (65\")   Body mass index is 24.89 kg/m².     Physical Exam   Constitutional: He appears well-developed and well-nourished. He is cooperative.  Non-toxic appearance. He does not appear ill.   HENT:   Head: Normocephalic and atraumatic.   Right Ear: Hearing normal.   Left Ear: Hearing normal.   Nose: Nose normal.   Eyes: Pupils are equal, round, and reactive to light. Conjunctivae and EOM are normal.   Neck: Trachea normal. No thyromegaly present.   Cardiovascular: Regular rhythm. Tachycardia present.   No murmur heard.  Pulmonary/Chest: Effort normal and breath sounds normal. No respiratory distress.   Abdominal: Soft. Bowel sounds are normal. He exhibits no distension. There is no hepatosplenomegaly. There is no tenderness.   Lymphadenopathy:        Head (right side): No submandibular adenopathy present.        Head (left side): No submandibular adenopathy present.     He has no cervical adenopathy.   Neurological: He is alert. He has normal strength and normal reflexes.   Skin: Skin is warm, dry and intact. No rash noted.   Psychiatric: He has a normal mood and affect. His behavior is normal.   Vitals reviewed.         Labs/Imaging  Results for JEWELS PEÑA (MRN [id number]) as of 9/8/2020 13:14   Ref. Range 6/29/2020 12:46 6/29/2020 12:51 8/19/2020 10:15 9/8/2020 11:42   Glucose Latest Ref Range: 70 - 130 mg/dL 358 (H)   330 (A)   Sodium Latest Ref Range: 136 - 145 mmol/L 134 (L)      Potassium Latest Ref Range: 3.5 - 5.2 mmol/L 4.2    "   CO2 Latest Ref Range: 22.0 - 29.0 mmol/L 29.0      Chloride Latest Ref Range: 98 - 107 mmol/L 94 (L)      Creatinine Latest Ref Range: 0.76 - 1.27 mg/dL 0.87      BUN Latest Ref Range: 8 - 23 mg/dL 12      BUN/Creatinine Ratio Latest Ref Range: 7.0 - 25.0  13.8      Calcium Latest Ref Range: 8.6 - 10.5 mg/dL 9.4      eGFR Non  Am Latest Ref Range: >60 mL/min/1.73 89      eGFR African Am Latest Ref Range: >60 mL/min/1.73 107      Alkaline Phosphatase Latest Ref Range: 39 - 117 U/L 290 (H)      Total Protein Latest Ref Range: 6.0 - 8.5 g/dL 7.4      ALT (SGPT) Latest Ref Range: 1 - 41 U/L 21      AST (SGOT) Latest Ref Range: 1 - 40 U/L 17      Total Bilirubin Latest Ref Range: 0.2 - 1.2 mg/dL 0.4      Albumin Latest Ref Range: 3.50 - 5.20 g/dL 4.20      A/G Ratio Latest Units: g/dL 1.3      Hemoglobin A1C Latest Ref Range: 4.80 - 5.60 % 11.40 (H)      Total Cholesterol Latest Ref Range: 0 - 200 mg/dL  107     HDL Cholesterol Latest Ref Range: 40 - 60 mg/dL  32 (L)     LDL Cholesterol  Latest Ref Range: 0 - 100 mg/dL  37     VLDL Cholesterol Latest Units: mg/dL  38.2     Triglycerides Latest Ref Range: 0 - 150 mg/dL  191 (H)     Globulin Latest Units: gm/dL 3.2      WBC Latest Ref Range: 3.40 - 10.80 10*3/mm3 8.42      RBC Latest Ref Range: 4.14 - 5.80 10*6/mm3 5.59      Hemoglobin Latest Ref Range: 13.0 - 17.7 g/dL 16.1      Hematocrit Latest Ref Range: 37.5 - 51.0 % 48.3      RDW Latest Ref Range: 12.3 - 15.4 % 12.9      MCV Latest Ref Range: 79.0 - 97.0 fL 86.4      MCH Latest Ref Range: 26.6 - 33.0 pg 28.8      MCHC Latest Ref Range: 31.5 - 35.7 g/dL 33.3      Platelets Latest Ref Range: 140 - 450 10*3/mm3 270      Hep C Virus Ab Latest Ref Range: 0.0 - 0.9 s/co ratio 0.1      Creatinine, Urine Latest Ref Range: Not Estab. mg/dL  61.6     Microalbumin, Urine Latest Ref Range: Not Estab. ug/mL  65.0     Microalbumin/Creatinine Ratio Latest Ref Range: 0 - 29 mg/g creat  106 (H)         Assessment and  Plan    Pascual was seen today for establish care and diabetes.    Diagnoses and all orders for this visit:    Type 1 diabetes mellitus with other specified complication (CMS/[facility])  - uncontrolled with most recent hemoglobin A1c 11.4%, too soon to repeat  - Lack of glycemic control likely secondary to nonadherence with prescribed medical regimen  - We will plan to check C-peptide as a measure of endogenous insulin production to verify diagnosis of type 1 diabetes  - discussed with patient the dangers of not taking insulin regularly, specifically DKA. Reviewed signs and symptoms of DKA and when to seek care.  - reviewed dangers of using insulin in the absence of glycemic monitoring. Will attempt to get patient prodigy pump given visual impairment.  - discussed possible options for changes in therapy pending c peptide result  - patient reports he is not interested in pump or CGM therapy  - will plan to increase levemir and split dose, 40 units twice daily  - continue novolog 15 units with meals, add correction of 2:50 >200  -Patient was advised to monitor blood sugar 3-4 times daily.  Patient was instructed to bring glucometer to all future appointments. Patient should contact the clinic between appointments with hypoglycemia or persistent hyperglycemia.  Discussed signs and symptoms of hypoglycemia as well as hypoglycemia management via the role of 15's.  Discussed potential for long-term complications with uncontrolled diabetes including nephropathy, neuropathy, retinopathy, increased risk for cardiac disease.  Discussed the role of diet and exercise in the management of diabetes.  -CMP, lipids, urine micro up-to-date from June 2020  -Patient reports eye exam up to date from within the past year.  -     POCT Glucose  -     Glucose, Random  -     C-Peptide  -     insulin aspart (NovoLOG FlexPen) 100 UNIT/ML solution pen-injector sc pen; For use at mealtimes plus correctional scale, up to 60 units daily  -     insulin  detemir (Levemir FlexTouch) 100 UNIT/ML injection; Inject 40 Units under the skin into the appropriate area as directed 2 (Two) Times a Day.  -     glucose blood test strip; Use as instructed 3-4 times a day  -     glucose monitor monitoring kit; 1 each 1 (One) Time for 1 dose. Please dispense prodigy monitor         Return in about 3 months (around 12/8/2020) for Next scheduled follow up. The patient was instructed to contact the clinic with any interval questions or concerns.    Maryan Jarquin MD     Please note that portions of this document were completed using a voice recognition program. Efforts were made to edit the dictations, but occasionally words are mis-transcribed.

## 2020-09-09 LAB
C PEPTIDE SERPL-MCNC: 2.6 NG/ML (ref 1.1–4.4)
GLUCOSE SERPL-MCNC: 230 MG/DL (ref 65–99)

## 2020-09-10 ENCOUNTER — TELEPHONE (OUTPATIENT)
Dept: ENDOCRINOLOGY | Facility: CLINIC | Age: 65
End: 2020-09-10

## 2020-09-10 DIAGNOSIS — E11.65 TYPE 2 DIABETES MELLITUS WITH HYPERGLYCEMIA, WITH LONG-TERM CURRENT USE OF INSULIN (HCC): Primary | ICD-10-CM

## 2020-09-10 DIAGNOSIS — Z79.4 TYPE 2 DIABETES MELLITUS WITH HYPERGLYCEMIA, WITH LONG-TERM CURRENT USE OF INSULIN (HCC): Primary | ICD-10-CM

## 2020-09-10 NOTE — TELEPHONE ENCOUNTER
----- Message from Maryan Jarquin MD sent at 9/10/2020  9:10 AM EDT -----  Please contact patient, if he is agreeable to starting trulicity, please let me know and I will send prescription. Side effects include nausea, vomiting, diarrhea. Patient denied history of gallstones or MTC during appointment.

## 2020-09-10 NOTE — TELEPHONE ENCOUNTER
Needed to clarify if patient is taking any thyroid medication, if so what is the name of the medication.    Patient states he is not taking any thyroid medication.    Patient states he has never taken any thyroid meds.

## 2020-09-10 NOTE — TELEPHONE ENCOUNTER
patient agreed to start taking Trulicity.  Patient will let us know if he has any of the side effects Dr. Jarquin mentioned.  Nausea, vomiting or diarrhea.

## 2020-09-16 ENCOUNTER — TELEPHONE (OUTPATIENT)
Dept: ENDOCRINOLOGY | Facility: CLINIC | Age: 65
End: 2020-09-16

## 2020-09-16 NOTE — TELEPHONE ENCOUNTER
Patient's spouse is requesting a return call regarding the patient's glucose readings and amount of insulin he is taking. She states that he is dropping too low.    Call back [phone]

## 2020-09-16 NOTE — TELEPHONE ENCOUNTER
Spoke with patient's wife. She reports patient was pre-syncopal and that symptoms have now resolved. Will empirically reduce mealtime novolog to 5 units with meals. Also reviewed that patient should eat right after taking insulin (episode occurred when he did not eat until more than 30 minutes after injection). Patient to call back with further issues.

## 2020-09-16 NOTE — TELEPHONE ENCOUNTER
Call and spoke with pt. He states his fasting BS this AM was 133. States wife gave him Novolog 15 units according to his sliding scale and 40 units Levemir. States he had some pasta and drank coffee with sugar. Pt states he started to sweat and feel ill. States he can't see so he was unable to check his BS. States he drank juice but thinks he passed out. Pt asking if his insulin should be adjusted? States he has been taking his meds appropriately and has been dieting.

## 2020-10-04 DIAGNOSIS — E10.69 TYPE 1 DIABETES MELLITUS WITH OTHER SPECIFIED COMPLICATION (HCC): ICD-10-CM

## 2020-10-05 RX ORDER — INSULIN DETEMIR 100 [IU]/ML
INJECTION, SOLUTION SUBCUTANEOUS
Qty: 24 ML | Refills: 5 | Status: SHIPPED | OUTPATIENT
Start: 2020-09-08 | End: 2021-02-05

## 2020-10-05 RX ORDER — MELOXICAM 15 MG/1
15 TABLET ORAL DAILY
Qty: 30 TABLET | Refills: 5 | Status: SHIPPED | OUTPATIENT
Start: 2020-10-05 | End: 2021-01-14 | Stop reason: SDUPTHER

## 2020-11-17 ENCOUNTER — OFFICE VISIT (OUTPATIENT)
Dept: ENDOCRINOLOGY | Facility: CLINIC | Age: 65
End: 2020-11-17

## 2020-11-17 ENCOUNTER — TELEPHONE (OUTPATIENT)
Dept: INTERNAL MEDICINE | Facility: CLINIC | Age: 65
End: 2020-11-17

## 2020-11-17 VITALS
OXYGEN SATURATION: 99 % | BODY MASS INDEX: 26.19 KG/M2 | HEIGHT: 64 IN | HEART RATE: 110 BPM | DIASTOLIC BLOOD PRESSURE: 68 MMHG | SYSTOLIC BLOOD PRESSURE: 114 MMHG | WEIGHT: 153.4 LBS

## 2020-11-17 DIAGNOSIS — R19.7 DIARRHEA, UNSPECIFIED TYPE: ICD-10-CM

## 2020-11-17 DIAGNOSIS — E11.65 TYPE 2 DIABETES MELLITUS WITH HYPERGLYCEMIA, WITH LONG-TERM CURRENT USE OF INSULIN (HCC): Primary | ICD-10-CM

## 2020-11-17 DIAGNOSIS — E11.649 DIABETIC HYPOGLYCEMIA (HCC): ICD-10-CM

## 2020-11-17 DIAGNOSIS — Z79.4 TYPE 2 DIABETES MELLITUS WITH HYPERGLYCEMIA, WITH LONG-TERM CURRENT USE OF INSULIN (HCC): Primary | ICD-10-CM

## 2020-11-17 DIAGNOSIS — E78.5 HYPERLIPIDEMIA, UNSPECIFIED HYPERLIPIDEMIA TYPE: ICD-10-CM

## 2020-11-17 LAB
EXPIRATION DATE: ABNORMAL
EXPIRATION DATE: NORMAL
GLUCOSE BLDC GLUCOMTR-MCNC: 239 MG/DL (ref 70–130)
HBA1C MFR BLD: 7.3 %
Lab: ABNORMAL
Lab: NORMAL

## 2020-11-17 PROCEDURE — 82947 ASSAY GLUCOSE BLOOD QUANT: CPT | Performed by: INTERNAL MEDICINE

## 2020-11-17 PROCEDURE — 99214 OFFICE O/P EST MOD 30 MIN: CPT | Performed by: INTERNAL MEDICINE

## 2020-11-17 PROCEDURE — 83036 HEMOGLOBIN GLYCOSYLATED A1C: CPT | Performed by: INTERNAL MEDICINE

## 2020-11-17 NOTE — TELEPHONE ENCOUNTER
MEDICAL UPDATE:     PATIENT WIFE EMILEE PEÑA CALLED TO LET DR. CHAN KNOW THAT HER  PATIENT JEWELS PEÑA JUST SEEN THE ENDOCRINOLOGIST SPECIALIST ON 11/17/20 AND SHE WILL LIKE TO REPORT THAT THE PATIENTS AIC IS DOWN TO 7.3.     PATIENT WIFE WAS JUST INFORMING OF THIS PRIOR TO APPOINTMENT IN DECEMBER.       PATIENT CONTACT: [phone]

## 2020-11-17 NOTE — PROGRESS NOTES
Chief Complaint   Patient presents with   • Follow-up   • Diabetes        HPI   Pascual Kaur is a 65 y.o. male had concerns including Follow-up and Diabetes.      Patient started on Trulicity following his most recent visit.  He did develop some postprandial hypoglycemia after starting Trulicity and thus mealtime insulin was reduced.  He has not had any further confirmed postprandial hypoglycemia though his wife does report that they have essentially discontinued use of short acting insulin with meals.  He has had some instances of overnight hypoglycemia, occurring around 2 AM in the interim since his last visit.  Approximately 2 weeks after his last visit they reduced Levemir To 35 units twice daily.  Since then hypoglycemia has become less frequent and less severe though he does report some instances of awakening with glucose in the low 70s overnight and last few weeks.  Patient is currently taking Levemir 35 units twice daily and Trulicity 0.75 mg weekly.  He had and his wife report that in the last several weeks he has utilized only a couple of doses of correctional NovoLog.  Patient reports hypoglycemia resolves with food intake.  They also noted hyperglycemia following high starch meals such as spaghetti.  Patient does report some mild diarrhea which occurs transiently after his Trulicity injection.  He reports he generally has approximately 24 hours of looser stools starting approximately 24 hours after his injection.  This then resolves.  He reports he is comfortable continuing medication despite this affect.  He denies any nausea, abdominal pain.    Patient continues on atorvastatin 20 mg daily.  He denies any myalgias or abdominal pain.    The following portions of the patient's history were reviewed and updated as appropriate: allergies, current medications and past social history.    Review of Systems   Respiratory: Negative for cough and shortness of breath.    Cardiovascular: Negative for chest  "pain and palpitations.   Gastrointestinal: Positive for diarrhea. Negative for constipation.      /68   Pulse 110   Ht 162.6 cm (64\")   Wt 69.6 kg (153 lb 6.4 oz)   SpO2 99%   BMI 26.33 kg/m²      Physical Exam      Constitutional:  well developed; well nourished  no acute distress   ENT/Thyroid: not examined   Eyes: Conjunctiva: clear   Respiratory:  breathing is unlabored  clear to auscultation bilaterally   Cardiovascular:  regular rate and rhythm   Chest:  Not performed.   Abdomen: soft, non-tender; no masses   : Not performed.   Musculoskeletal: Not performed   Skin: dry and warm   Neuro: mental status, speech normal   Psych: mood and affect are within normal limits       Labs/Imaging  Results for JEWELS PEÑA (MRN [id number]) as of 11/17/2020 10:01   Ref. Range 11/17/2020 09:59 11/17/2020 09:59   Glucose Latest Ref Range: 70 - 130 mg/dL 239 (A)    Hemoglobin A1C Latest Units: %  7.3       Diagnoses and all orders for this visit:    1. Type 2 diabetes mellitus with hyperglycemia, with long-term current use of insulin (CMS/[facility]) (Primary)  - patient also with hypoglycemia  -Hemoglobin A1c 7.3% in office today, much improved from previous  -Patient with occasional hypoglycemia overnight, likely due to excess basal insulin, plan to reduce Levemir to 30 units twice daily  -Patient taking Trulicity 0.75 mg weekly, he does have some diarrhea but desires to continue medication, continue Trulicity 0.75 mg weekly  -Discontinue mealtime insulin, patient given updated correctional scale of 1 unit for every 50 greater than 200  -Patient was advised to monitor blood sugar 3 times daily.  Patient was instructed to bring glucometer to all future appointments. Patient should contact the clinic between appointments with hypoglycemia or persistent hyperglycemia.  Discussed signs and symptoms of hypoglycemia as well as hypoglycemia management via the rule of 15's.  Discussed potential for long-term " complications with uncontrolled diabetes including nephropathy, neuropathy, retinopathy, increased risk for cardiac disease.  Discussed the role of diet and exercise in the management of diabetes.   Lipid panel up-to-date from June 2020  CMP up-to-date from June 2020, EGFR normal  Urine microalbumin up-to-date from June 2020, elevated  Eye exam up-to-date from February 2020  -     POC Glucose, Blood  -     POC Glycosylated Hemoglobin (Hb A1C)    2. Hyperlipidemia, unspecified hyperlipidemia type  -Lipid panel up-to-date, continue atorvastatin    3. Diabetic hypoglycemia (CMS/[facility])  -Overnight, likely due to excess basal insulin, reducing Levemir as above    4. Diarrhea, unspecified type  -Discussed that this is potentially a medication side effect from Trulicity  -Patient reports this was mild and transient and does not wish to discontinue Trulicity, patient to contact the office with any worsening symptoms        Return in about 3 months (around 2/17/2021). The patient was instructed to contact the clinic with any interval questions or concerns.    Maryan Jarquin MD   Endocrinologist    Please note that portions of this document were completed with a voice recognition program. Efforts were made to edit the dictations, but occasionally words are mis-transcribed.

## 2020-11-23 RX ORDER — ATORVASTATIN CALCIUM 20 MG/1
20 TABLET, FILM COATED ORAL DAILY
Qty: 90 TABLET | Refills: 3 | Status: SHIPPED | OUTPATIENT
Start: 2020-11-23 | End: 2022-02-10 | Stop reason: SDUPTHER

## 2020-11-23 RX ORDER — OMEPRAZOLE 40 MG/1
40 CAPSULE, DELAYED RELEASE ORAL DAILY
Qty: 90 CAPSULE | Refills: 3 | Status: SHIPPED | OUTPATIENT
Start: 2020-11-23 | End: 2022-01-04 | Stop reason: SDUPTHER

## 2020-11-30 ENCOUNTER — TELEPHONE (OUTPATIENT)
Dept: ENDOCRINOLOGY | Facility: CLINIC | Age: 65
End: 2020-11-30

## 2020-11-30 DIAGNOSIS — E11.65 TYPE 2 DIABETES MELLITUS WITH HYPERGLYCEMIA, WITH LONG-TERM CURRENT USE OF INSULIN (HCC): ICD-10-CM

## 2020-11-30 DIAGNOSIS — Z79.4 TYPE 2 DIABETES MELLITUS WITH HYPERGLYCEMIA, WITH LONG-TERM CURRENT USE OF INSULIN (HCC): ICD-10-CM

## 2020-11-30 RX ORDER — DULAGLUTIDE 0.75 MG/.5ML
0.75 INJECTION, SOLUTION SUBCUTANEOUS WEEKLY
Qty: 2 ML | Refills: 6 | Status: SHIPPED | OUTPATIENT
Start: 2020-11-30 | End: 2021-03-22

## 2020-11-30 NOTE — TELEPHONE ENCOUNTER
I resubmitted prescription to pharmacy. Patient can try to see if he can refill early, though insurance may not cover this. If this is the case, patient will need to reach out to [employer] to see if they can assist.

## 2020-11-30 NOTE — TELEPHONE ENCOUNTER
Dr. Jarquin, please advise, pt did not get his dose of 0.75 mg weekly.     Checked and we don't have any samples of Trulicity at this time.  Thank you.

## 2020-11-30 NOTE — TELEPHONE ENCOUNTER
Informed patient's wife Ines of Dr. Jarquin's recommendations.  Also informed patient of [employer] website.

## 2020-12-21 ENCOUNTER — OFFICE VISIT (OUTPATIENT)
Dept: INTERNAL MEDICINE | Facility: CLINIC | Age: 65
End: 2020-12-21

## 2020-12-21 ENCOUNTER — RESULTS ENCOUNTER (OUTPATIENT)
Dept: INTERNAL MEDICINE | Facility: CLINIC | Age: 65
End: 2020-12-21

## 2020-12-21 VITALS
DIASTOLIC BLOOD PRESSURE: 60 MMHG | HEART RATE: 106 BPM | SYSTOLIC BLOOD PRESSURE: 110 MMHG | BODY MASS INDEX: 24.38 KG/M2 | HEIGHT: 64 IN | TEMPERATURE: 97.3 F | WEIGHT: 142.8 LBS | OXYGEN SATURATION: 97 %

## 2020-12-21 DIAGNOSIS — Z12.11 SCREEN FOR COLON CANCER: ICD-10-CM

## 2020-12-21 DIAGNOSIS — E10.42 TYPE 1 DIABETES MELLITUS WITH DIABETIC POLYNEUROPATHY (HCC): Primary | ICD-10-CM

## 2020-12-21 DIAGNOSIS — J41.0 SIMPLE CHRONIC BRONCHITIS (HCC): ICD-10-CM

## 2020-12-21 DIAGNOSIS — F33.1 MODERATE EPISODE OF RECURRENT MAJOR DEPRESSIVE DISORDER (HCC): ICD-10-CM

## 2020-12-21 DIAGNOSIS — C34.11 MALIGNANT NEOPLASM OF UPPER LOBE OF RIGHT LUNG (HCC): ICD-10-CM

## 2020-12-21 PROCEDURE — 99214 OFFICE O/P EST MOD 30 MIN: CPT | Performed by: INTERNAL MEDICINE

## 2020-12-21 RX ORDER — FLUTICASONE PROPIONATE 50 MCG
2 SPRAY, SUSPENSION (ML) NASAL DAILY
Qty: 16 G | Refills: 5 | Status: SHIPPED | OUTPATIENT
Start: 2020-12-21

## 2020-12-21 NOTE — PROGRESS NOTES
Subjective  Pascual Kaur is a 65 y.o. male    HPI immune for follow-up is accompanied by his wife was giving us some of the history he has a history of reflux esophagitis has type 1 diabetes on Trulicity.  Levemir dosing 30 units twice a day with good blood sugar readings.  Has not had episodes of hypoglycemia since his Levemir dose was reduced.  He is compliant with his other medications and dietary restrictions.  He has been off tobacco use for about 10 years now.  He has diabetic retinopathy.  Following up with retina specialist at [facility]    The following portions of the patient's history were reviewed and updated as appropriate: allergies, current medications, past family history, past medical history, past social history, past surgical history, and problem list.     Review of Systems   Constitutional: Positive for fatigue. Negative for activity change, appetite change and fever.   HENT: Negative for congestion, ear discharge, ear pain and trouble swallowing.    Eyes: Negative for photophobia and visual disturbance.   Respiratory: Positive for cough. Negative for shortness of breath.    Cardiovascular: Negative for chest pain and palpitations.   Gastrointestinal: Negative for abdominal distention, abdominal pain, constipation, diarrhea, nausea and vomiting.   Endocrine: Negative.    Genitourinary: Negative for dysuria, hematuria and urgency.   Musculoskeletal: Positive for arthralgias. Negative for back pain, joint swelling and myalgias.   Skin: Negative for color change and rash.   Allergic/Immunologic: Negative.    Neurological: Negative for dizziness, weakness, light-headedness and headaches.   Hematological: Negative for adenopathy. Does not bruise/bleed easily.   Psychiatric/Behavioral: Positive for sleep disturbance. Negative for agitation, confusion and dysphoric mood. The patient is not nervous/anxious.        Visit Vitals  /60   Pulse 106   Temp 97.3 °F (36.3 °C) (Temporal)   Ht 162.6 cm  "(64\")   Wt 64.8 kg (142 lb 12.8 oz)   SpO2 97%   BMI 24.51 kg/m²       Objective  Physical Exam  Constitutional:       General: He is not in acute distress.     Appearance: He is well-developed.   HENT:      Nose: Nose normal.   Eyes:      General: No scleral icterus.     Conjunctiva/sclera: Conjunctivae normal.   Neck:      Thyroid: No thyromegaly.      Trachea: No tracheal deviation.   Cardiovascular:      Rate and Rhythm: Normal rate and regular rhythm.      Heart sounds: No murmur. No friction rub.   Pulmonary:      Effort: No respiratory distress.      Breath sounds: No wheezing or rales.   Abdominal:      General: There is no distension.      Palpations: Abdomen is soft. There is no mass.      Tenderness: There is no abdominal tenderness. There is no guarding.   Musculoskeletal: Normal range of motion.         General: Deformity present.   Lymphadenopathy:      Cervical: No cervical adenopathy.   Skin:     General: Skin is warm and dry.      Findings: No erythema or rash.   Neurological:      Mental Status: He is alert and oriented to person, place, and time.      Cranial Nerves: No cranial nerve deficit.      Coordination: Coordination normal.      Deep Tendon Reflexes: Reflexes are normal and symmetric.   Psychiatric:         Behavior: Behavior normal.         Thought Content: Thought content normal.         Judgment: Judgment normal.         Diagnoses and all orders for this visit:    Type 1 diabetes mellitus with diabetic polyneuropathy (CMS/HCC) has had much better blood sugar readings since follow-up with endocrinologist.  Compliant with meds now    Simple chronic bronchitis (CMS/HCC) stable continue with inhalers    Moderate episode of recurrent major depressive disorder (CMS/HCC) stable on current meds sleep okay    Malignant neoplasm of upper lobe of right lung (CMS/HCC) that is post resection has been symptom-free        "

## 2021-01-13 RX ORDER — MELOXICAM 15 MG/1
15 TABLET ORAL DAILY
Qty: 90 TABLET | Refills: 3 | Status: CANCELLED | OUTPATIENT
Start: 2021-01-13

## 2021-01-14 ENCOUNTER — TELEPHONE (OUTPATIENT)
Dept: INTERNAL MEDICINE | Facility: CLINIC | Age: 66
End: 2021-01-14

## 2021-01-14 NOTE — TELEPHONE ENCOUNTER
PTS WIFE CALLED REQUESTING A REFILL FOR:  meloxicam (MOBIC) 15 MG tablet     [facility] - [facility]      SHE STATED PTS ARTHRITIS IS GETTING WORSE AND SHE IS REQUESTING TO KNOW IF THERE IS SOMETHING DIFFERENT TO HELP HIM WITH MOBILITY OF HIS HANDS     CALL BACK NUMBER: [phone]

## 2021-01-14 NOTE — TELEPHONE ENCOUNTER
PTS WIFE CALLED REQUESTING A REFILL FOR:  meloxicam (MOBIC) 15 MG tablet    [facility] - [facility]     SHE STATED PTS ARTHRITIS IS GETTING WORSE AND SHE IS REQUESTING TO KNOW IF THERE IS SOMETHING DIFFERENT TO HELP HIM WITH MOBILITY OF HIS HANDS    CALL BACK NUMBER: [phone]

## 2021-01-15 ENCOUNTER — TELEPHONE (OUTPATIENT)
Dept: INTERNAL MEDICINE | Facility: CLINIC | Age: 66
End: 2021-01-15

## 2021-01-15 DIAGNOSIS — M79.642 BILATERAL HAND PAIN: Primary | ICD-10-CM

## 2021-01-15 DIAGNOSIS — M79.641 BILATERAL HAND PAIN: Primary | ICD-10-CM

## 2021-01-15 RX ORDER — MELOXICAM 15 MG/1
15 TABLET ORAL DAILY
Qty: 90 TABLET | Refills: 3 | Status: CANCELLED | OUTPATIENT
Start: 2021-01-15

## 2021-01-15 RX ORDER — MELOXICAM 15 MG/1
15 TABLET ORAL DAILY
Qty: 90 TABLET | Refills: 3 | Status: SHIPPED | OUTPATIENT
Start: 2021-01-15 | End: 2021-10-01

## 2021-01-15 NOTE — TELEPHONE ENCOUNTER
EMILEE STATED THEY GOT A DENIAL ON THE meloxicam (MOBIC) 15 MG tablet REFILL THEY REQUESTED, BUT THERE WAS NO REASON WHY.    EMILEE STATED PATIENT CANNOT CONTINUE TO LIVE THE WAY HE IS NOW.  HE USES HIS HANDS FOR WORK AND THEY'RE SWELLING TO THE POINT HE CAN'T EVEN HOLD A CUP.  IF YOU CAN'T REFILL THIS IS SHE WOULD LIKE TO KNOW IF YOU CAN SEND IN SOMETHING ELSE.    PLEASE CONTACT EMILEE TO ADVISE.    CALLBACK:  [phone]

## 2021-01-27 ENCOUNTER — TELEPHONE (OUTPATIENT)
Dept: ENDOCRINOLOGY | Facility: CLINIC | Age: 66
End: 2021-01-27

## 2021-01-27 NOTE — TELEPHONE ENCOUNTER
Dr. Jarquin, please advise.     I received the FMLA forms a couple of days ago, faxed them back to the company that had faxed them to us informing them to fax them to the patient's PCP.      Patient's wife is now calling regarding FMLA papers.  Please advise.  Thank you.

## 2021-01-27 NOTE — TELEPHONE ENCOUNTER
Please contact patient. A need for FMLA had not previously been mentioned during patient's prior appointments. (I have no details on why FMLA is needed, etc.). I also do not have the paperwork available to review.    Who would fill out this paperwork is generally dependent on the reason for the FMLA request, which I do not know. Generally, this would only follow under my purview should employer require FMLA use for routine appointments-which is fairly rare.    For most situations, this is something a PCP would handle, however, since I do not know any details of the request it is difficult for me to comment.    I am happy to discuss this at upcoming appointment or to comment further once more details are available.

## 2021-01-27 NOTE — TELEPHONE ENCOUNTER
Patients wife would like a call back. She has questions about [facility] paperwork and why Dr. Jarquin will not fill this out.

## 2021-01-27 NOTE — TELEPHONE ENCOUNTER
PATIENT WIFE (YANDY) CALLED IN REFERENCE TO NEEDING [facility] PAPERWORK FILLED OUT DUE TO TRANSPORTING HIM TO HIS APPOINTMENTS AS WELL AS BEING HIS CARE TAKER.  STATES SHE ACCOMPANIES PATIENT TO ALL OF HIS APPOINTMENTS.    PLEASE CALL TO DISCUSS WHO SHOULD BE FILLING OUT THIS PAPERWORK FOR HER.    Yandy Kaur    [phone]

## 2021-01-28 ENCOUNTER — TELEPHONE (OUTPATIENT)
Dept: INTERNAL MEDICINE | Facility: CLINIC | Age: 66
End: 2021-01-28

## 2021-01-28 ENCOUNTER — TELEPHONE (OUTPATIENT)
Dept: ENDOCRINOLOGY | Facility: CLINIC | Age: 66
End: 2021-01-28

## 2021-01-28 NOTE — TELEPHONE ENCOUNTER
Patient called to tell Dr. Jarquin that she got her message and the FMLA forms is for her to be able to take the patient to his appointments. Once they get them, he will fill them out and fax them back over. If you have any questions please call her back after 4. Please advise.

## 2021-01-28 NOTE — TELEPHONE ENCOUNTER
I spoke with Yandy - the paperwork is for her work. She provides transportation for Pascual to all his appointments.    Yandy is checking back with Dr. Jarquin's office and will let us know if they are unable to sign off on intermittent leave

## 2021-01-28 NOTE — TELEPHONE ENCOUNTER
Duplicate message:      Ever, states the [facility] paperwork is for her since she takes care of patient and takes patient to his doctors appointments as well as drives him to the appointments.    Ever, will have the people that sent the [facility] paperwork, fax them again to us in order for Dr. Jarquin to take a look at the forms.    Provided Ever with our fax number:  [phone] attn:  Pamela

## 2021-01-28 NOTE — TELEPHONE ENCOUNTER
Ever, states the [facility] paperwork is for her since she takes care of patient and takes patient to his doctors appointments as well as drives him to the appointments.    Ever, will have the people that sent the [facility] paperwork, fax them again to us in order for Dr. Jarquin to take a look at the forms.    Provided Ever with our fax number:  [phone] attn:  Pamela

## 2021-01-28 NOTE — TELEPHONE ENCOUNTER
Left message for Ever, patient's wife to return my call:  Is FMLA paperwork for her? and can we have this paperwork faxed to us to Dr. Jarquin can talk a look at it?

## 2021-02-04 ENCOUNTER — TELEPHONE (OUTPATIENT)
Dept: ENDOCRINOLOGY | Facility: CLINIC | Age: 66
End: 2021-02-04

## 2021-02-04 PROBLEM — Z79.4 TYPE 2 DIABETES MELLITUS WITH HYPERGLYCEMIA, WITH LONG-TERM CURRENT USE OF INSULIN (HCC): Status: ACTIVE | Noted: 2021-02-04

## 2021-02-04 PROBLEM — E11.65 TYPE 2 DIABETES MELLITUS WITH HYPERGLYCEMIA, WITH LONG-TERM CURRENT USE OF INSULIN: Status: ACTIVE | Noted: 2021-02-04

## 2021-02-04 NOTE — TELEPHONE ENCOUNTER
Patient gave Dr. Jarquin permission to submit the [facility] paperwork his wife sent to us to fill out.

## 2021-03-22 DIAGNOSIS — Z79.4 TYPE 2 DIABETES MELLITUS WITH HYPERGLYCEMIA, WITH LONG-TERM CURRENT USE OF INSULIN (HCC): ICD-10-CM

## 2021-03-22 DIAGNOSIS — E11.65 TYPE 2 DIABETES MELLITUS WITH HYPERGLYCEMIA, WITH LONG-TERM CURRENT USE OF INSULIN (HCC): ICD-10-CM

## 2021-03-22 RX ORDER — DULAGLUTIDE 0.75 MG/.5ML
0.75 INJECTION, SOLUTION SUBCUTANEOUS WEEKLY
Qty: 2 ML | Refills: 5 | Status: SHIPPED | OUTPATIENT
Start: 2021-03-22 | End: 2021-04-19 | Stop reason: SDUPTHER

## 2021-03-22 NOTE — TELEPHONE ENCOUNTER
Refill request.    LOV:  11/17/20  Future visit:  04/19/21    Last refill:  11/30/20  Q.  2 mL  R.  6  Sig:    Inject 0.75 mg under the skin into the appropriate area as directed 1 (One) Time Per Week.             [facility]

## 2021-03-30 ENCOUNTER — IMMUNIZATION (OUTPATIENT)
Dept: VACCINE CLINIC | Facility: HOSPITAL | Age: 66
End: 2021-03-30

## 2021-03-30 PROCEDURE — 0001A: CPT | Performed by: INTERNAL MEDICINE

## 2021-03-30 PROCEDURE — 91300 HC SARSCOV02 VAC 30MCG/0.3ML IM: CPT | Performed by: INTERNAL MEDICINE

## 2021-04-19 ENCOUNTER — OFFICE VISIT (OUTPATIENT)
Dept: ENDOCRINOLOGY | Facility: CLINIC | Age: 66
End: 2021-04-19

## 2021-04-19 ENCOUNTER — LAB (OUTPATIENT)
Dept: LAB | Facility: HOSPITAL | Age: 66
End: 2021-04-19

## 2021-04-19 VITALS
HEART RATE: 99 BPM | DIASTOLIC BLOOD PRESSURE: 72 MMHG | OXYGEN SATURATION: 96 % | SYSTOLIC BLOOD PRESSURE: 132 MMHG | WEIGHT: 146.8 LBS | BODY MASS INDEX: 25.06 KG/M2 | HEIGHT: 64 IN

## 2021-04-19 DIAGNOSIS — E11.65 TYPE 2 DIABETES MELLITUS WITH HYPERGLYCEMIA, WITH LONG-TERM CURRENT USE OF INSULIN (HCC): Primary | ICD-10-CM

## 2021-04-19 DIAGNOSIS — E10.69 TYPE 1 DIABETES MELLITUS WITH OTHER SPECIFIED COMPLICATION (HCC): ICD-10-CM

## 2021-04-19 DIAGNOSIS — E78.5 DYSLIPIDEMIA: ICD-10-CM

## 2021-04-19 DIAGNOSIS — Z79.4 TYPE 2 DIABETES MELLITUS WITH HYPERGLYCEMIA, WITH LONG-TERM CURRENT USE OF INSULIN (HCC): ICD-10-CM

## 2021-04-19 DIAGNOSIS — Z79.4 TYPE 2 DIABETES MELLITUS WITH HYPERGLYCEMIA, WITH LONG-TERM CURRENT USE OF INSULIN (HCC): Primary | ICD-10-CM

## 2021-04-19 DIAGNOSIS — E11.65 TYPE 2 DIABETES MELLITUS WITH HYPERGLYCEMIA, WITH LONG-TERM CURRENT USE OF INSULIN (HCC): ICD-10-CM

## 2021-04-19 LAB
ALBUMIN UR-MCNC: 71.4 MG/DL
CHOLEST SERPL-MCNC: 105 MG/DL (ref 0–200)
CREAT UR-MCNC: 76.1 MG/DL
EXPIRATION DATE: NORMAL
EXPIRATION DATE: NORMAL
GLUCOSE BLDC GLUCOMTR-MCNC: 108 MG/DL (ref 70–130)
HBA1C MFR BLD: 7.1 %
HDLC SERPL-MCNC: 28 MG/DL (ref 40–60)
LDLC SERPL CALC-MCNC: 54 MG/DL (ref 0–100)
LDLC/HDLC SERPL: 1.83 {RATIO}
Lab: NORMAL
Lab: NORMAL
MICROALBUMIN/CREAT UR: 938.2 MG/G
TRIGL SERPL-MCNC: 129 MG/DL (ref 0–150)
VLDLC SERPL-MCNC: 23 MG/DL (ref 5–40)

## 2021-04-19 PROCEDURE — 99214 OFFICE O/P EST MOD 30 MIN: CPT | Performed by: INTERNAL MEDICINE

## 2021-04-19 PROCEDURE — 83036 HEMOGLOBIN GLYCOSYLATED A1C: CPT | Performed by: INTERNAL MEDICINE

## 2021-04-19 PROCEDURE — 80061 LIPID PANEL: CPT | Performed by: INTERNAL MEDICINE

## 2021-04-19 PROCEDURE — 82570 ASSAY OF URINE CREATININE: CPT

## 2021-04-19 PROCEDURE — 82043 UR ALBUMIN QUANTITATIVE: CPT

## 2021-04-19 PROCEDURE — 80053 COMPREHEN METABOLIC PANEL: CPT | Performed by: INTERNAL MEDICINE

## 2021-04-19 PROCEDURE — 82947 ASSAY GLUCOSE BLOOD QUANT: CPT | Performed by: INTERNAL MEDICINE

## 2021-04-19 RX ORDER — INSULIN ASPART 100 [IU]/ML
INJECTION, SOLUTION INTRAVENOUS; SUBCUTANEOUS
Qty: 15 ML | Refills: 5 | Status: SHIPPED | OUTPATIENT
Start: 2021-04-19

## 2021-04-19 RX ORDER — DULAGLUTIDE 0.75 MG/.5ML
0.75 INJECTION, SOLUTION SUBCUTANEOUS WEEKLY
Qty: 2 ML | Refills: 5 | Status: SHIPPED | OUTPATIENT
Start: 2021-04-19 | End: 2021-10-19

## 2021-04-19 NOTE — PROGRESS NOTES
"Chief Complaint   Patient presents with   • Follow-up   • Diabetes        HPI   Pascual Kaur is a 65 y.o. male had concerns including Follow-up and Diabetes.     Patient last seen in November 2020 with hemoglobin A1c 7.3%    Current prescribed diabetes medications include the following:   Trulicity 0.75 mg weekly-patient reports occasional mild diarrhea following injection but does not wish to discontinue.  Levemir 30 units twice daily  NovoLog via correctional scale 1 for 50 greater than 200, patient reports that he uses this on rare occasions, generally only if he has had a high carbohydrate meal such as pasta.  Patient reports no change in the interim since last visit.  Data from glucometer reviewed with values ranging .  Patient's wife reports that all values greater than 200 postprandial.    Patient continues on atorvastatin 20 mg daily for dyslipidemia.  He denies myalgias or abdominal pain.    The following portions of the patient's history were reviewed and updated as appropriate: allergies, current medications and past social history.    Review of Systems   Constitutional: Positive for unexpected weight loss. Negative for fatigue.   Gastrointestinal: Positive for diarrhea. Negative for abdominal pain.   Endocrine: Negative for polydipsia and polyuria.      /72   Pulse 99   Ht 162.6 cm (64\")   Wt 66.6 kg (146 lb 12.8 oz)   SpO2 96%   BMI 25.20 kg/m²      Physical Exam  Cardiovascular:      Pulses:           Dorsalis pedis pulses are 2+ on the right side and 2+ on the left side.        Posterior tibial pulses are 2+ on the right side and 2+ on the left side.   Feet:      Right foot:      Protective Sensation: 10 sites tested. 10 sites sensed.      Left foot:      Protective Sensation: 10 sites tested. 10 sites sensed.           Constitutional:  well developed; well nourished  no acute distress  appears stated age   ENT/Thyroid: not examined   Eyes: Conjunctiva: clear "   Respiratory:  breathing is unlabored  clear to auscultation bilaterally   Cardiovascular:  regular rate and rhythm   Chest:  Not performed.   Abdomen: soft, non-tender; no masses   : Not performed.   Musculoskeletal: Not performed   Skin: dry and warm   Neuro: mental status, speech normal   Psych: mood and affect are within normal limits     Diabetic Foot Exam Performed and Monofilament Test Performed  Labs/Imaging  Results for JEWELS PEÑA (MRN [id number]) as of 4/19/2021 15:44   Ref. Range 4/19/2021 15:27 4/19/2021 15:27   Glucose Latest Ref Range: 70 - 130 mg/dL 108    Hemoglobin A1C Latest Units: %  7.1       Diagnoses and all orders for this visit:    1. Type 2 diabetes mellitus with hyperglycemia, with long-term current use of insulin (CMS/[facility]) (Primary)  -Hemoglobin A1c 7.1%  -Patient with occasional postprandial hyperglycemia after high carbohydrate meals, glucose otherwise generally stable  -Patient continue Levemir 30 units twice daily  -Patient to continue Trulicity 0.75 mg weekly  -Discussed that if patient continues to lose weight, insulin requirements may decrease.  Patient will contact the clinic if he develops any fasting hypoglycemia.  -Patient was advised to monitor blood sugar 2 times daily.  Patient was instructed to bring glucometer to all future appointments. Patient should contact the clinic between appointments with hypoglycemia or persistent hyperglycemia.  Discussed signs and symptoms of hypoglycemia as well as hypoglycemia management via the rule of 15's.  Discussed potential for long-term complications with uncontrolled diabetes including nephropathy, neuropathy, retinopathy, increased risk for cardiac disease.  Discussed the role of diet and exercise in the management of diabetes.  Update screening labs today  Monofilament exam completed today  Patient reports eye exam last week following cataract surgery at .  -     POC Glucose, Blood  -     POC Glycosylated  Hemoglobin (Hb A1C)  -     Microalbumin / Creatinine Urine Ratio - Urine, Clean Catch; Future  -     Comprehensive Metabolic Panel  -     Lipid Panel    2. Dyslipidemia  -Repeat lipid panel today  -Taking atorvastatin 20 mg daily, adjust as needed based on repeat labs, patient is fasting  -     Lipid Panel      Return in about 6 months (around 10/19/2021) for Next scheduled follow up. The patient was instructed to contact the clinic with any interval questions or concerns.    Maryan Jarquin MD   Endocrinologist    Please note that portions of this document were completed with a voice recognition program. Efforts were made to edit the dictations, but occasionally words are mis-transcribed.

## 2021-04-20 LAB
ALBUMIN SERPL-MCNC: 4.2 G/DL (ref 3.5–5.2)
ALBUMIN/GLOB SERPL: 1.2 G/DL
ALP SERPL-CCNC: 216 U/L (ref 39–117)
ALT SERPL W P-5'-P-CCNC: 12 U/L (ref 1–41)
ANION GAP SERPL CALCULATED.3IONS-SCNC: 10.8 MMOL/L (ref 5–15)
AST SERPL-CCNC: 15 U/L (ref 1–40)
BILIRUB SERPL-MCNC: 0.4 MG/DL (ref 0–1.2)
BUN SERPL-MCNC: 16 MG/DL (ref 8–23)
BUN/CREAT SERPL: 26.2 (ref 7–25)
CALCIUM SPEC-SCNC: 9.5 MG/DL (ref 8.6–10.5)
CHLORIDE SERPL-SCNC: 97 MMOL/L (ref 98–107)
CO2 SERPL-SCNC: 31.2 MMOL/L (ref 22–29)
CREAT SERPL-MCNC: 0.61 MG/DL (ref 0.76–1.27)
GFR SERPL CREATININE-BSD FRML MDRD: 133 ML/MIN/1.73
GLOBULIN UR ELPH-MCNC: 3.5 GM/DL
GLUCOSE SERPL-MCNC: 82 MG/DL (ref 65–99)
POTASSIUM SERPL-SCNC: 4.1 MMOL/L (ref 3.5–5.2)
PROT SERPL-MCNC: 7.7 G/DL (ref 6–8.5)
SODIUM SERPL-SCNC: 139 MMOL/L (ref 136–145)

## 2021-04-21 ENCOUNTER — IMMUNIZATION (OUTPATIENT)
Dept: VACCINE CLINIC | Facility: HOSPITAL | Age: 66
End: 2021-04-21

## 2021-04-21 PROCEDURE — 0002A: CPT | Performed by: INTERNAL MEDICINE

## 2021-04-21 PROCEDURE — 91300 HC SARSCOV02 VAC 30MCG/0.3ML IM: CPT | Performed by: INTERNAL MEDICINE

## 2021-04-26 ENCOUNTER — TELEPHONE (OUTPATIENT)
Dept: ENDOCRINOLOGY | Facility: CLINIC | Age: 66
End: 2021-04-26

## 2021-04-26 NOTE — TELEPHONE ENCOUNTER
----- Message from Maryan Jarquin MD sent at 4/21/2021 11:57 AM EDT -----  Please contact patient. Urine microalbumin is elevated. Based on this, I would recommend starting lisinopril 5 mg daily to help protect the kidneys. Side effects include cough, potassium elevation and low blood pressure. Please let me know if patient is agreeable and I can send prescription.

## 2021-04-27 DIAGNOSIS — R80.9 MICROALBUMINURIA: Primary | ICD-10-CM

## 2021-04-27 RX ORDER — LISINOPRIL 5 MG/1
5 TABLET ORAL DAILY
Qty: 30 TABLET | Refills: 5 | Status: SHIPPED | OUTPATIENT
Start: 2021-04-27 | End: 2022-02-01

## 2021-04-27 NOTE — TELEPHONE ENCOUNTER
Informed Yandy of patient's lab results.  Ever voiced understanding and agree to have patient take Lisinopril 5 mg daily.

## 2021-04-27 NOTE — TELEPHONE ENCOUNTER
Yandy (pt's spouse) returned Pamela's call regarding lab results. Please return call to her at work [phone]. Spouse is listed on [employer].

## 2021-05-06 RX ORDER — ESCITALOPRAM OXALATE 5 MG/1
5 TABLET ORAL DAILY
Qty: 90 TABLET | Refills: 3 | Status: SHIPPED | OUTPATIENT
Start: 2021-05-06 | End: 2022-06-06 | Stop reason: SDUPTHER

## 2021-06-21 ENCOUNTER — OFFICE VISIT (OUTPATIENT)
Dept: INTERNAL MEDICINE | Facility: CLINIC | Age: 66
End: 2021-06-21

## 2021-06-21 VITALS
WEIGHT: 150.4 LBS | HEIGHT: 64 IN | DIASTOLIC BLOOD PRESSURE: 70 MMHG | SYSTOLIC BLOOD PRESSURE: 130 MMHG | HEART RATE: 99 BPM | TEMPERATURE: 97.7 F | OXYGEN SATURATION: 98 % | BODY MASS INDEX: 25.68 KG/M2

## 2021-06-21 DIAGNOSIS — M79.642 BILATERAL HAND PAIN: ICD-10-CM

## 2021-06-21 DIAGNOSIS — J41.0 SIMPLE CHRONIC BRONCHITIS (HCC): ICD-10-CM

## 2021-06-21 DIAGNOSIS — M79.641 BILATERAL HAND PAIN: ICD-10-CM

## 2021-06-21 DIAGNOSIS — E11.65 TYPE 2 DIABETES MELLITUS WITH HYPERGLYCEMIA, WITH LONG-TERM CURRENT USE OF INSULIN (HCC): Primary | ICD-10-CM

## 2021-06-21 DIAGNOSIS — Z79.4 TYPE 2 DIABETES MELLITUS WITH HYPERGLYCEMIA, WITH LONG-TERM CURRENT USE OF INSULIN (HCC): Primary | ICD-10-CM

## 2021-06-21 PROCEDURE — 99214 OFFICE O/P EST MOD 30 MIN: CPT | Performed by: INTERNAL MEDICINE

## 2021-06-21 NOTE — PROGRESS NOTES
"Subjective  Pascual Kaur is a 65 y.o. male    HPI coming in for follow-up patient with a history of lung CA status post resection has quit smoking for a few years now history of diabetes with basal insulin twice a day and Trulicity with good control of his A1c.  History of proteinuria noted now on lisinopril which he appears to be tolerating well history of anxiety.  Continues to work he is on statins and aspirin.  Following up with endocrinology    The following portions of the patient's history were reviewed and updated as appropriate: allergies, current medications, past family history, past medical history, past social history, past surgical history, and problem list.     Review of Systems   Constitutional: Positive for fatigue. Negative for activity change, appetite change and fever.   HENT: Negative for congestion, ear discharge, ear pain and trouble swallowing.    Eyes: Negative for photophobia and visual disturbance.   Respiratory: Positive for cough. Negative for shortness of breath.    Cardiovascular: Negative for chest pain and palpitations.   Gastrointestinal: Negative for abdominal distention, abdominal pain, constipation, diarrhea, nausea and vomiting.   Endocrine: Negative.    Genitourinary: Negative for dysuria, hematuria and urgency.   Musculoskeletal: Positive for arthralgias, joint swelling and myalgias. Negative for back pain.   Skin: Negative for color change and rash.   Allergic/Immunologic: Negative.    Neurological: Negative for dizziness, weakness, light-headedness and headaches.   Hematological: Negative for adenopathy. Does not bruise/bleed easily.   Psychiatric/Behavioral: Negative for agitation, confusion and dysphoric mood. The patient is not nervous/anxious.        Visit Vitals  /70   Pulse 99   Temp 97.7 °F (36.5 °C) (Infrared)   Ht 162.6 cm (64\")   Wt 68.2 kg (150 lb 6.4 oz)   SpO2 98%   BMI 25.82 kg/m²       Objective  Physical Exam  Constitutional:       General: " He is not in acute distress.     Appearance: He is well-developed.   HENT:      Nose: Nose normal.   Eyes:      General: No scleral icterus.     Conjunctiva/sclera: Conjunctivae normal.   Neck:      Thyroid: No thyromegaly.      Trachea: No tracheal deviation.   Cardiovascular:      Rate and Rhythm: Normal rate and regular rhythm.      Heart sounds: No murmur heard.   No friction rub.   Pulmonary:      Effort: No respiratory distress.      Breath sounds: No wheezing or rales.   Abdominal:      General: There is no distension.      Palpations: Abdomen is soft. There is no mass.      Tenderness: There is no abdominal tenderness. There is no guarding.   Musculoskeletal:         General: Swelling, tenderness and deformity present. Normal range of motion.   Lymphadenopathy:      Cervical: No cervical adenopathy.   Skin:     General: Skin is warm and dry.      Findings: No erythema or rash.   Neurological:      Mental Status: He is alert and oriented to person, place, and time.      Cranial Nerves: No cranial nerve deficit.      Coordination: Coordination normal.      Deep Tendon Reflexes: Reflexes are normal and symmetric.   Psychiatric:         Behavior: Behavior normal.         Thought Content: Thought content normal.         Judgment: Judgment normal.         Diagnoses and all orders for this visit:    Type 2 diabetes mellitus with hyperglycemia, with long-term current use of insulin (CMS/HCC)    Simple chronic bronchitis (CMS/HCC)    Bilateral hand pain    Diabetes control finally almost optimal.  Continue current meds no episodes suggestive of hypoglycemia  Stable albuterol inhaler as needed  With hand swelling noted suggestive of rheumatoid arthritis has an appointment with rheumatology.  Holding off on labs as they will be determined by specialist

## 2021-08-18 RX ORDER — AZITHROMYCIN 250 MG/1
TABLET, FILM COATED ORAL
Qty: 6 TABLET | Refills: 0 | Status: SHIPPED | OUTPATIENT
Start: 2021-08-18 | End: 2022-04-11

## 2021-10-19 ENCOUNTER — OFFICE VISIT (OUTPATIENT)
Dept: ENDOCRINOLOGY | Facility: CLINIC | Age: 66
End: 2021-10-19

## 2021-10-19 VITALS
HEIGHT: 64 IN | HEART RATE: 104 BPM | DIASTOLIC BLOOD PRESSURE: 72 MMHG | OXYGEN SATURATION: 94 % | WEIGHT: 153 LBS | SYSTOLIC BLOOD PRESSURE: 126 MMHG | BODY MASS INDEX: 26.12 KG/M2

## 2021-10-19 DIAGNOSIS — E78.5 HYPERLIPIDEMIA, UNSPECIFIED HYPERLIPIDEMIA TYPE: ICD-10-CM

## 2021-10-19 DIAGNOSIS — E11.65 TYPE 2 DIABETES MELLITUS WITH HYPERGLYCEMIA, WITH LONG-TERM CURRENT USE OF INSULIN (HCC): Primary | ICD-10-CM

## 2021-10-19 DIAGNOSIS — Z79.4 TYPE 2 DIABETES MELLITUS WITH HYPERGLYCEMIA, WITH LONG-TERM CURRENT USE OF INSULIN (HCC): Primary | ICD-10-CM

## 2021-10-19 LAB
EXPIRATION DATE: ABNORMAL
EXPIRATION DATE: NORMAL
GLUCOSE BLDC GLUCOMTR-MCNC: 182 MG/DL (ref 70–130)
HBA1C MFR BLD: 8.5 %
Lab: ABNORMAL
Lab: NORMAL

## 2021-10-19 PROCEDURE — 82947 ASSAY GLUCOSE BLOOD QUANT: CPT | Performed by: INTERNAL MEDICINE

## 2021-10-19 PROCEDURE — 99214 OFFICE O/P EST MOD 30 MIN: CPT | Performed by: INTERNAL MEDICINE

## 2021-10-19 PROCEDURE — 83036 HEMOGLOBIN GLYCOSYLATED A1C: CPT | Performed by: INTERNAL MEDICINE

## 2021-10-19 RX ORDER — DULAGLUTIDE 0.75 MG/.5ML
0.75 INJECTION, SOLUTION SUBCUTANEOUS WEEKLY
Qty: 2 ML | Refills: 5 | Status: SHIPPED | OUTPATIENT
Start: 2021-10-19 | End: 2022-02-01 | Stop reason: SDUPTHER

## 2021-10-19 RX ORDER — INSULIN DETEMIR 100 [IU]/ML
30 INJECTION, SOLUTION SUBCUTANEOUS DAILY
Qty: 18 ML | Refills: 3 | Status: SHIPPED | OUTPATIENT
Start: 2021-10-19 | End: 2021-11-09 | Stop reason: SDUPTHER

## 2021-10-19 NOTE — PROGRESS NOTES
"Chief Complaint   Patient presents with   • Diabetes        HPI   Pascual Kaur is a 66 y.o. male had concerns including Diabetes.     Patient started on steroids by rheumatology in the interim since last visit.  He reports that he took steroids for approximately 2 months before discontinuing upon initiation of methotrexate a couple of weeks ago.  He reports that Levemir was increased by pharmacy to 35 units twice daily due to hyperglycemia.  He was also utilizing correctional NovoLog during that time.  His wife estimates that she is going approximately 15 units total per day of correction, she has not had to utilize this in the past few days.    Current diabetes medications include the following: Trulicity 0.75 mg weekly, Levemir 35 units twice daily, NovoLog via correctional scale 2:50 greater than 150.  Patient has gained 7 pounds since last visit.    Patient continues on atorvastatin 20 mg daily for hyperlipidemia, he denies myalgias or abdominal pain.    The following portions of the patient's history were reviewed and updated as appropriate: allergies, current medications and past social history.    Review of Systems   Cardiovascular: Negative for palpitations.   Gastrointestinal: Negative for abdominal pain, nausea and vomiting.   Endocrine: Negative for polydipsia and polyuria.   Musculoskeletal: Positive for arthralgias.      /72 (BP Location: Left arm, Patient Position: Sitting, Cuff Size: Adult)   Pulse 104   Ht 162.6 cm (64\")   Wt 69.4 kg (153 lb)   SpO2 94%   BMI 26.26 kg/m²      Physical Exam      Constitutional:  well developed; well nourished  no acute distress   ENT/Thyroid: not examined   Eyes: Conjunctiva: clear   Respiratory:  breathing is unlabored  clear to auscultation bilaterally   Cardiovascular:  regular rate and rhythm   Chest:  Not performed.   Abdomen: soft, non-tender; no masses   : Not performed.   Musculoskeletal: Not performed   Skin: dry and warm   Neuro: " mental status, speech normal   Psych: mood and affect are within normal limits       Labs/Imaging  Results for JEWELS PEÑA (MRN [id number]) as of 10/19/2021 17:00   Ref. Range 10/19/2021 16:30 10/19/2021 16:30   Glucose Latest Ref Range: 70 - 130 mg/dL 182 (A)    Hemoglobin A1C Latest Units: %  8.5       Diagnoses and all orders for this visit:    1. Type 2 diabetes mellitus with hyperglycemia, with long-term current use of insulin (HCC) (Primary)  -Uncontrolled with hemoglobin A1c 8.5%  -No home glucose data available for review, patient's wife reports values in the mid to high 100s since discontinuation of steroids 2 weeks ago.  -Discussed recommendation to reduce Levemir back to previous dose of 30 units twice daily given prior hypoglycemia on higher doses of basal insulin and patient's inability to self monitor blood glucose.  -Patient still reports intermittent diarrhea with Trulicity, he does not wish to discontinue metoprolol dose increase.  Continue 0.75 mg weekly  -Patient to continue correctional NovoLog 2 per 50 greater than 150  -Discussed potential medications which could be added to improve prandial coverage without the risk of hypoglycemia.  Discussed potential initiation of SGLT2 inhibitor.  Reviewed potential adverse effects including dehydration and sequela of dehydration such as low blood pressure, electrolyte abnormalities, hypotension.  Discussed risk of urinary tract and yeast infection as well as skin infections.  Will hold off for now the patient's wife instructed to contact the office if values persistently greater than 200.  -Patient was advised to monitor blood sugar 3 times daily.  Patient was instructed to bring glucometer to all future appointments. Patient should contact the clinic between appointments with hypoglycemia or persistent hyperglycemia.  Discussed signs and symptoms of hypoglycemia as well as hypoglycemia management via the rule of 15's.  Discussed potential  for long-term complications with uncontrolled diabetes including nephropathy, neuropathy, retinopathy, increased risk for cardiac disease.  Discussed the role of diet and exercise in the management of diabetes.  Lipid up-to-date from April 2021, LDL 54, triglycerides 129  CMP up-to-date from April 2021, EGFR 43  Urine microalbumin up-to-date, elevated in April 2021  Monofilament exam completed April 2021  Eye exam up-to-date from the past year  -     POC Glucose, Blood  -     POC Glycosylated Hemoglobin (Hb A1C)  -     Dulaglutide (Trulicity) 0.75 MG/0.5ML solution pen-injector; Inject 0.75 mg under the skin into the appropriate area as directed 1 (One) Time Per Week.  Dispense: 2 mL; Refill: 5    2. Hyperlipidemia, unspecified hyperlipidemia type  -Patient taking atorvastatin 20 mg daily  -Lipid panel up-to-date    Other orders  -     insulin detemir (Levemir FlexTouch) 100 UNIT/ML injection; Inject 30 Units under the skin into the appropriate area as directed Daily.  Dispense: 18 mL; Refill: 3         Return in about 3 months (around 1/19/2022). The patient was instructed to contact the clinic with any interval questions or concerns.    Maryan Jarquin MD   Endocrinologist    Please note that portions of this document were completed with a voice recognition program. Efforts were made to edit the dictations, but occasionally words are mis-transcribed.

## 2021-10-26 ENCOUNTER — TRANSCRIBE ORDERS (OUTPATIENT)
Dept: LAB | Facility: HOSPITAL | Age: 66
End: 2021-10-26

## 2021-10-26 ENCOUNTER — LAB (OUTPATIENT)
Dept: LAB | Facility: HOSPITAL | Age: 66
End: 2021-10-26

## 2021-10-26 DIAGNOSIS — M06.09 RHEUMATOID ARTHRITIS WITHOUT RHEUMATOID FACTOR, MULTIPLE SITES (HCC): ICD-10-CM

## 2021-10-26 DIAGNOSIS — Z79.899 ENCOUNTER FOR LONG-TERM (CURRENT) USE OF OTHER MEDICATIONS: ICD-10-CM

## 2021-10-26 DIAGNOSIS — M15.0 PRIMARY GENERALIZED (OSTEO)ARTHRITIS: ICD-10-CM

## 2021-10-26 DIAGNOSIS — E11.65 TYPE II DIABETES MELLITUS WITH HYPEROSMOLARITY, UNCONTROLLED (HCC): ICD-10-CM

## 2021-10-26 DIAGNOSIS — M79.10 MYALGIA, UNSPECIFIED SITE: ICD-10-CM

## 2021-10-26 DIAGNOSIS — E11.00 TYPE II DIABETES MELLITUS WITH HYPEROSMOLARITY, UNCONTROLLED (HCC): ICD-10-CM

## 2021-10-26 DIAGNOSIS — M06.09 RHEUMATOID ARTHRITIS WITHOUT RHEUMATOID FACTOR, MULTIPLE SITES (HCC): Primary | ICD-10-CM

## 2021-10-26 LAB
ALBUMIN SERPL-MCNC: 4.4 G/DL (ref 3.5–5.2)
ALBUMIN/GLOB SERPL: 1.4 G/DL
ALP SERPL-CCNC: 212 U/L (ref 39–117)
ALT SERPL W P-5'-P-CCNC: 17 U/L (ref 1–41)
ANION GAP SERPL CALCULATED.3IONS-SCNC: 7.9 MMOL/L (ref 5–15)
AST SERPL-CCNC: 20 U/L (ref 1–40)
BILIRUB SERPL-MCNC: 0.3 MG/DL (ref 0–1.2)
BUN SERPL-MCNC: 17 MG/DL (ref 8–23)
BUN/CREAT SERPL: 19.3 (ref 7–25)
CALCIUM SPEC-SCNC: 9.6 MG/DL (ref 8.6–10.5)
CHLORIDE SERPL-SCNC: 97 MMOL/L (ref 98–107)
CO2 SERPL-SCNC: 28.1 MMOL/L (ref 22–29)
CREAT SERPL-MCNC: 0.88 MG/DL (ref 0.76–1.27)
DEPRECATED RDW RBC AUTO: 41.4 FL (ref 37–54)
ERYTHROCYTE [DISTWIDTH] IN BLOOD BY AUTOMATED COUNT: 13.1 % (ref 12.3–15.4)
GFR SERPL CREATININE-BSD FRML MDRD: 87 ML/MIN/1.73
GLOBULIN UR ELPH-MCNC: 3.2 GM/DL
GLUCOSE SERPL-MCNC: 173 MG/DL (ref 65–99)
HCT VFR BLD AUTO: 41.3 % (ref 37.5–51)
HGB BLD-MCNC: 13.9 G/DL (ref 13–17.7)
MCH RBC QN AUTO: 29.4 PG (ref 26.6–33)
MCHC RBC AUTO-ENTMCNC: 33.7 G/DL (ref 31.5–35.7)
MCV RBC AUTO: 87.5 FL (ref 79–97)
PLATELET # BLD AUTO: 325 10*3/MM3 (ref 140–450)
PMV BLD AUTO: 8.6 FL (ref 6–12)
POTASSIUM SERPL-SCNC: 4.4 MMOL/L (ref 3.5–5.2)
PROT SERPL-MCNC: 7.6 G/DL (ref 6–8.5)
RBC # BLD AUTO: 4.72 10*6/MM3 (ref 4.14–5.8)
SODIUM SERPL-SCNC: 133 MMOL/L (ref 136–145)
WBC # BLD AUTO: 8.78 10*3/MM3 (ref 3.4–10.8)

## 2021-10-26 PROCEDURE — 80053 COMPREHEN METABOLIC PANEL: CPT

## 2021-10-26 PROCEDURE — 36415 COLL VENOUS BLD VENIPUNCTURE: CPT

## 2021-10-26 PROCEDURE — 85027 COMPLETE CBC AUTOMATED: CPT

## 2021-11-08 NOTE — TELEPHONE ENCOUNTER
Hansa from [facility] called to ask if we could change once a day to twice daily due to him currently doing twice daily.  Number: [phone]

## 2021-11-09 RX ORDER — INSULIN DETEMIR 100 [IU]/ML
30 INJECTION, SOLUTION SUBCUTANEOUS 2 TIMES DAILY
Qty: 30 ML | Refills: 3 | Status: SHIPPED | OUTPATIENT
Start: 2021-11-09 | End: 2022-02-01 | Stop reason: SDUPTHER

## 2021-11-12 ENCOUNTER — TELEPHONE (OUTPATIENT)
Dept: ENDOCRINOLOGY | Facility: CLINIC | Age: 66
End: 2021-11-12

## 2021-11-12 NOTE — TELEPHONE ENCOUNTER
Looks like he was started on lisinopril for proteinuria, not blood pressure. He could try taking 1/2 tablet daily. If he does not tolerate the lower dose, recommend to stop the lisinopril again and call the office back. Thank you.

## 2021-11-12 NOTE — TELEPHONE ENCOUNTER
PT'S WIFE CALLED STATING THAT THE PT HAS BEEN DIZZY LATELY AND SHE TOOK HIM OFF OF THE LISINOPRIL. SENSE STOPPING MED HE HAS NOT HAD ANY DIZZY SPELLS. PT'S WIFE WANTED TO KNOW IF WE COULD PRESCRIBE SOMETHING ELSE IN IT'S STEAD. PLEASE CONFER W/ PROVIDER AND REACH OUT TO PT OR HIS WIFE. THANK YOU

## 2021-11-30 ENCOUNTER — LAB (OUTPATIENT)
Dept: LAB | Facility: HOSPITAL | Age: 66
End: 2021-11-30

## 2021-11-30 DIAGNOSIS — M15.0 PRIMARY GENERALIZED (OSTEO)ARTHRITIS: ICD-10-CM

## 2021-11-30 DIAGNOSIS — Z79.899 ENCOUNTER FOR LONG-TERM (CURRENT) USE OF OTHER MEDICATIONS: ICD-10-CM

## 2021-11-30 DIAGNOSIS — M06.09 RHEUMATOID ARTHRITIS WITHOUT RHEUMATOID FACTOR, MULTIPLE SITES (HCC): ICD-10-CM

## 2021-11-30 DIAGNOSIS — E11.00 TYPE II DIABETES MELLITUS WITH HYPEROSMOLARITY, UNCONTROLLED (HCC): ICD-10-CM

## 2021-11-30 DIAGNOSIS — M79.10 MYALGIA, UNSPECIFIED SITE: ICD-10-CM

## 2021-11-30 DIAGNOSIS — E11.65 TYPE II DIABETES MELLITUS WITH HYPEROSMOLARITY, UNCONTROLLED (HCC): ICD-10-CM

## 2021-11-30 PROCEDURE — 80053 COMPREHEN METABOLIC PANEL: CPT

## 2021-11-30 PROCEDURE — 85027 COMPLETE CBC AUTOMATED: CPT

## 2021-11-30 PROCEDURE — 36415 COLL VENOUS BLD VENIPUNCTURE: CPT

## 2021-12-01 LAB
ALBUMIN SERPL-MCNC: 3.9 G/DL (ref 3.5–5.2)
ALBUMIN/GLOB SERPL: 1.2 G/DL
ALP SERPL-CCNC: 170 U/L (ref 39–117)
ALT SERPL W P-5'-P-CCNC: 13 U/L (ref 1–41)
ANION GAP SERPL CALCULATED.3IONS-SCNC: 10.3 MMOL/L (ref 5–15)
AST SERPL-CCNC: 19 U/L (ref 1–40)
BILIRUB SERPL-MCNC: 0.4 MG/DL (ref 0–1.2)
BUN SERPL-MCNC: 17 MG/DL (ref 8–23)
BUN/CREAT SERPL: 18.7 (ref 7–25)
CALCIUM SPEC-SCNC: 9.3 MG/DL (ref 8.6–10.5)
CHLORIDE SERPL-SCNC: 96 MMOL/L (ref 98–107)
CO2 SERPL-SCNC: 29.7 MMOL/L (ref 22–29)
CREAT SERPL-MCNC: 0.91 MG/DL (ref 0.76–1.27)
DEPRECATED RDW RBC AUTO: 45.1 FL (ref 37–54)
ERYTHROCYTE [DISTWIDTH] IN BLOOD BY AUTOMATED COUNT: 13.7 % (ref 12.3–15.4)
GFR SERPL CREATININE-BSD FRML MDRD: 84 ML/MIN/1.73
GLOBULIN UR ELPH-MCNC: 3.2 GM/DL
GLUCOSE SERPL-MCNC: 126 MG/DL (ref 65–99)
HCT VFR BLD AUTO: 40.6 % (ref 37.5–51)
HGB BLD-MCNC: 13.4 G/DL (ref 13–17.7)
MCH RBC QN AUTO: 29.8 PG (ref 26.6–33)
MCHC RBC AUTO-ENTMCNC: 33 G/DL (ref 31.5–35.7)
MCV RBC AUTO: 90.4 FL (ref 79–97)
PLATELET # BLD AUTO: 309 10*3/MM3 (ref 140–450)
PMV BLD AUTO: 9.6 FL (ref 6–12)
POTASSIUM SERPL-SCNC: 4.5 MMOL/L (ref 3.5–5.2)
PROT SERPL-MCNC: 7.1 G/DL (ref 6–8.5)
RBC # BLD AUTO: 4.49 10*6/MM3 (ref 4.14–5.8)
SODIUM SERPL-SCNC: 136 MMOL/L (ref 136–145)
WBC NRBC COR # BLD: 7.28 10*3/MM3 (ref 3.4–10.8)

## 2022-01-04 DIAGNOSIS — K21.9 GASTROESOPHAGEAL REFLUX DISEASE WITHOUT ESOPHAGITIS: Primary | ICD-10-CM

## 2022-01-04 RX ORDER — OMEPRAZOLE 40 MG/1
40 CAPSULE, DELAYED RELEASE ORAL DAILY
Qty: 90 CAPSULE | Refills: 3 | Status: SHIPPED | OUTPATIENT
Start: 2022-01-04 | End: 2023-02-13 | Stop reason: SDUPTHER

## 2022-01-04 NOTE — TELEPHONE ENCOUNTER
Rx Refill Note  Requested Prescriptions     Pending Prescriptions Disp Refills   • omeprazole (priLOSEC) 40 MG capsule 90 capsule 3     Sig: Take 1 capsule by mouth Daily.      Last office visit with prescribing clinician: 6/21/2021      Next office visit with prescribing clinician: Visit date not found            JESSI MORRIS MA  01/04/22, 09:13 EST

## 2022-02-01 ENCOUNTER — OFFICE VISIT (OUTPATIENT)
Dept: ENDOCRINOLOGY | Facility: CLINIC | Age: 67
End: 2022-02-01

## 2022-02-01 VITALS
SYSTOLIC BLOOD PRESSURE: 124 MMHG | HEIGHT: 64 IN | BODY MASS INDEX: 26.46 KG/M2 | WEIGHT: 155 LBS | HEART RATE: 90 BPM | OXYGEN SATURATION: 96 % | DIASTOLIC BLOOD PRESSURE: 70 MMHG

## 2022-02-01 DIAGNOSIS — E78.5 HYPERLIPIDEMIA, UNSPECIFIED HYPERLIPIDEMIA TYPE: ICD-10-CM

## 2022-02-01 DIAGNOSIS — R80.9 MICROALBUMINURIA: ICD-10-CM

## 2022-02-01 DIAGNOSIS — Z79.4 TYPE 2 DIABETES MELLITUS WITH HYPERGLYCEMIA, WITH LONG-TERM CURRENT USE OF INSULIN: Primary | ICD-10-CM

## 2022-02-01 DIAGNOSIS — E11.65 TYPE 2 DIABETES MELLITUS WITH HYPERGLYCEMIA, WITH LONG-TERM CURRENT USE OF INSULIN: Primary | ICD-10-CM

## 2022-02-01 LAB
EXPIRATION DATE: NORMAL
EXPIRATION DATE: NORMAL
GLUCOSE BLDC GLUCOMTR-MCNC: 122 MG/DL (ref 70–130)
HBA1C MFR BLD: 6.9 %
Lab: NORMAL
Lab: NORMAL

## 2022-02-01 PROCEDURE — 99214 OFFICE O/P EST MOD 30 MIN: CPT | Performed by: INTERNAL MEDICINE

## 2022-02-01 PROCEDURE — 82947 ASSAY GLUCOSE BLOOD QUANT: CPT | Performed by: INTERNAL MEDICINE

## 2022-02-01 PROCEDURE — 83036 HEMOGLOBIN GLYCOSYLATED A1C: CPT | Performed by: INTERNAL MEDICINE

## 2022-02-01 RX ORDER — DULAGLUTIDE 0.75 MG/.5ML
0.75 INJECTION, SOLUTION SUBCUTANEOUS WEEKLY
Qty: 2 ML | Refills: 5 | Status: SHIPPED | OUTPATIENT
Start: 2022-02-01 | End: 2022-07-28 | Stop reason: SDUPTHER

## 2022-02-01 RX ORDER — INSULIN DETEMIR 100 [IU]/ML
30 INJECTION, SOLUTION SUBCUTANEOUS 2 TIMES DAILY
Qty: 30 ML | Refills: 3 | Status: SHIPPED | OUTPATIENT
Start: 2022-02-01 | End: 2022-10-05 | Stop reason: SDUPTHER

## 2022-02-01 RX ORDER — BLOOD SUGAR DIAGNOSTIC
STRIP MISCELLANEOUS
Qty: 300 EACH | Refills: 3 | Status: SHIPPED | OUTPATIENT
Start: 2022-02-01

## 2022-02-01 NOTE — PROGRESS NOTES
"Chief Complaint   Patient presents with   • Diabetes        HPI   Pascual Kaur is a 66 y.o. male had concerns including Diabetes.      Patient's wife reports that he discontinued lisinopril in the interim since last visit due to concern for dizzy spells.  They did initially trial adding the tablet in half but spells continued and thus they stopped medication.  He does report symptoms have resolved since stopping lisinopril.    Patient continues on Levemir 30 units twice daily, Trulicity 0.75 mg weekly in the interim since last visit.  He denies any issues or concerns regarding his medications.  Download of glucometer in the past 2 weeks reveals values ranging 148-244.  Patient has not recently had to utilize correctional insulin.  He does report intermittent steroid use and hyperglycemia related to this but states this is rare.    Patient continues on atorvastatin 20 mg daily for hyperlipidemia.  He denies myalgias or abdominal pain    The following portions of the patient's history were reviewed and updated as appropriate: allergies, current medications and past social history.    Review of Systems   Gastrointestinal: Negative for constipation, diarrhea, nausea and vomiting.   Endocrine: Negative for polydipsia and polyuria.   Musculoskeletal: Negative for myalgias.   Neurological: Negative for dizziness.        /70 (BP Location: Left arm, Patient Position: Sitting, Cuff Size: Adult)   Pulse 90   Ht 162.6 cm (64\")   Wt 70.3 kg (155 lb)   SpO2 96%   BMI 26.61 kg/m²      Physical Exam      Constitutional:  well developed; well nourished  no acute distress  appears stated age   ENT/Thyroid: not examined   Eyes: Conjunctiva: clear   Respiratory:  breathing is unlabored  clear to auscultation bilaterally   Cardiovascular:  regular rate and rhythm   Chest:  Not performed.   Abdomen: soft, non-tender; no masses   : Not performed.   Musculoskeletal: Not performed   Skin: dry and warm   Neuro: " mental status, speech normal   Psych: mood and affect are within normal limits       Labs/Imaging  Results for JEWELS PEÑA (MRN [id number]) as of 2/1/2022 10:41   Ref. Range 2/1/2022 10:25 2/1/2022 10:25   Glucose Latest Ref Range: 70 - 130 mg/dL 122    Hemoglobin A1C Latest Units: %  6.9       Diagnoses and all orders for this visit:    1. Type 2 diabetes mellitus with hyperglycemia, with long-term current use of insulin (HCC) (Primary)  -Hemoglobin A1c at goal, 6.9%  -Review of home glucose data with some hyperglycemia  -Plan to continue Levemir 30 units twice a day  -continue Trulicity 0.75 mg weekly   Continue correctional NovoLog, as needed  -Patient was advised to monitor blood sugar 3 times daily.  Patient was instructed to bring glucometer to all future appointments. Patient should contact the clinic between appointments with hypoglycemia or persistent hyperglycemia.  Discussed signs and symptoms of hypoglycemia as well as hypoglycemia management via the rule of 15's.  Discussed potential for long-term complications with uncontrolled diabetes including nephropathy, neuropathy, retinopathy, increased risk for cardiac disease.  Discussed the role of diet and exercise in the management of diabetes.  screening labs due next visit   monofilament exam up-to-date from April 2021  -     POC Glucose, Blood  -     POC Glycosylated Hemoglobin (Hb A1C)    2. Hyperlipidemia, unspecified hyperlipidemia type  -Repeat lipid panel due next visit, continue atorvastatin 20 mg daily    3. Microalbuminuria  -Patient did not tolerate low-dose lisinopril due to dizziness  -Discussed importance of glycemic control    Other orders  -     glucose blood (Prodigy No Coding Blood Gluc) test strip; Test glucose three times daily for diabetes  Dispense: 300 each; Refill: 3       Return in about 4 months (around 6/1/2022) for Next scheduled follow up. The patient was instructed to contact the clinic with any interval  questions or concerns.    Maryan Jarquin MD   Endocrinologist    Dictated Utilizing Dragon Dictation

## 2022-02-10 RX ORDER — ATORVASTATIN CALCIUM 20 MG/1
20 TABLET, FILM COATED ORAL DAILY
Qty: 90 TABLET | Refills: 3 | Status: SHIPPED | OUTPATIENT
Start: 2022-02-10 | End: 2023-02-13 | Stop reason: SDUPTHER

## 2022-02-10 NOTE — TELEPHONE ENCOUNTER
Rx Refill Note  Requested Prescriptions     Pending Prescriptions Disp Refills   • atorvastatin (LIPITOR) 20 MG tablet 90 tablet 3     Sig: Take 1 tablet by mouth Daily.      Last office visit with prescribing clinician: 6/21/2021      Next office visit with prescribing clinician: Visit date not found            JESSI MORRIS MA  02/10/22, 14:17 EST

## 2022-04-11 ENCOUNTER — OFFICE VISIT (OUTPATIENT)
Dept: INTERNAL MEDICINE | Facility: CLINIC | Age: 67
End: 2022-04-11

## 2022-04-11 VITALS
HEIGHT: 64 IN | BODY MASS INDEX: 26.94 KG/M2 | DIASTOLIC BLOOD PRESSURE: 62 MMHG | TEMPERATURE: 97.7 F | WEIGHT: 157.8 LBS | HEART RATE: 95 BPM | OXYGEN SATURATION: 97 % | SYSTOLIC BLOOD PRESSURE: 130 MMHG

## 2022-04-11 DIAGNOSIS — Z79.4 TYPE 2 DIABETES MELLITUS WITH HYPERGLYCEMIA, WITH LONG-TERM CURRENT USE OF INSULIN: Primary | ICD-10-CM

## 2022-04-11 DIAGNOSIS — J41.0 SIMPLE CHRONIC BRONCHITIS: ICD-10-CM

## 2022-04-11 DIAGNOSIS — R35.1 NOCTURIA: ICD-10-CM

## 2022-04-11 DIAGNOSIS — E11.65 TYPE 2 DIABETES MELLITUS WITH HYPERGLYCEMIA, WITH LONG-TERM CURRENT USE OF INSULIN: Primary | ICD-10-CM

## 2022-04-11 LAB
BILIRUB BLD-MCNC: NEGATIVE MG/DL
CLARITY, POC: CLEAR
COLOR UR: YELLOW
EXPIRATION DATE: ABNORMAL
GLUCOSE UR STRIP-MCNC: NEGATIVE MG/DL
KETONES UR QL: NEGATIVE
LEUKOCYTE EST, POC: NEGATIVE
Lab: ABNORMAL
NITRITE UR-MCNC: NEGATIVE MG/ML
PH UR: 7.5 [PH] (ref 5–8)
PROT UR STRIP-MCNC: ABNORMAL MG/DL
RBC # UR STRIP: NEGATIVE /UL
SP GR UR: 1.01 (ref 1–1.03)
UROBILINOGEN UR QL: NORMAL

## 2022-04-11 PROCEDURE — 81003 URINALYSIS AUTO W/O SCOPE: CPT | Performed by: INTERNAL MEDICINE

## 2022-04-11 PROCEDURE — 99214 OFFICE O/P EST MOD 30 MIN: CPT | Performed by: INTERNAL MEDICINE

## 2022-04-11 NOTE — PROGRESS NOTES
"Subjective  Pascual Kaur is a 66 y.o. male    HPI coming in for a follow-up patient with diabetes history of lung CA status post resection is following up with rheumatology for multiple joint pains.  He has diabetes with reasonably good control by endocrinology  Complains of decreased urine flow and frequency.  Denies dysuria or hematuria  The following portions of the patient's history were reviewed and updated as appropriate: allergies, current medications, past family history, past medical history, past social history, past surgical history, and problem list.     Review of Systems   Constitutional: Negative.  Negative for activity change, appetite change, fatigue and fever.   HENT: Negative for congestion, ear discharge, ear pain and trouble swallowing.    Eyes: Negative for photophobia and visual disturbance.   Respiratory: Negative for cough and shortness of breath.    Cardiovascular: Negative for chest pain and palpitations.   Gastrointestinal: Positive for diarrhea. Negative for abdominal distention, abdominal pain, constipation, nausea and vomiting.   Endocrine: Negative.    Genitourinary: Negative for dysuria, hematuria and urgency.   Musculoskeletal: Positive for arthralgias. Negative for back pain, joint swelling and myalgias.   Skin: Negative for color change and rash.   Allergic/Immunologic: Negative.    Neurological: Negative for dizziness, weakness, light-headedness and headaches.   Hematological: Negative for adenopathy. Does not bruise/bleed easily.   Psychiatric/Behavioral: Negative for agitation, confusion and dysphoric mood. The patient is not nervous/anxious.        Visit Vitals  /62   Pulse 95   Temp 97.7 °F (36.5 °C) (Infrared)   Ht 162.6 cm (64\")   Wt 71.6 kg (157 lb 12.8 oz)   SpO2 97%   BMI 27.09 kg/m²       Objective  Physical Exam  Constitutional:       General: He is not in acute distress.     Appearance: He is well-developed.   HENT:      Nose: Nose normal.   Eyes:    "   General: No scleral icterus.     Conjunctiva/sclera: Conjunctivae normal.   Neck:      Thyroid: No thyromegaly.      Trachea: No tracheal deviation.   Cardiovascular:      Rate and Rhythm: Normal rate and regular rhythm.      Heart sounds: No murmur heard.    No friction rub.   Pulmonary:      Effort: No respiratory distress.      Breath sounds: No wheezing or rales.   Abdominal:      General: There is no distension.      Palpations: Abdomen is soft. There is no mass.      Tenderness: There is no abdominal tenderness. There is no guarding.   Musculoskeletal:         General: No deformity. Normal range of motion.   Lymphadenopathy:      Cervical: No cervical adenopathy.   Skin:     General: Skin is warm and dry.      Findings: No erythema or rash.   Neurological:      Mental Status: He is alert and oriented to person, place, and time.      Cranial Nerves: No cranial nerve deficit.      Coordination: Coordination normal.      Deep Tendon Reflexes: Reflexes are normal and symmetric.   Psychiatric:         Behavior: Behavior normal.         Thought Content: Thought content normal.         Judgment: Judgment normal.         Diagnoses and all orders for this visit:    Type 2 diabetes mellitus with hyperglycemia, with long-term current use of insulin ([facility]) continue with current insulin regimen.  Now he has a continuous monitor.  Follow A1c recent eye exam okay urine shows evidence of proteinuria needs good BP control    Simple chronic bronchitis ([facility]) does not smoke now albuterol inhaler as needed    Nocturia urine analysis without evidence of infection check PSA level.  Consider tamsulosin

## 2022-04-12 LAB — PSA SERPL-MCNC: 0.4 NG/ML (ref 0–4)

## 2022-04-30 ENCOUNTER — HOSPITAL ENCOUNTER (EMERGENCY)
Facility: HOSPITAL | Age: 67
Discharge: HOME | End: 2022-05-01
Attending: FAMILY MEDICINE | Admitting: FAMILY MEDICINE

## 2022-04-30 ENCOUNTER — APPOINTMENT (OUTPATIENT)
Dept: ULTRASOUND IMAGING | Facility: HOSPITAL | Age: 67
End: 2022-04-30

## 2022-04-30 DIAGNOSIS — S80.811A ABRASION OF RIGHT LOWER EXTREMITY, INITIAL ENCOUNTER: Primary | ICD-10-CM

## 2022-04-30 DIAGNOSIS — R60.0 LEG EDEMA, RIGHT: ICD-10-CM

## 2022-04-30 PROCEDURE — 93971 EXTREMITY STUDY: CPT

## 2022-04-30 PROCEDURE — 99282 EMERGENCY DEPT VISIT SF MDM: CPT

## 2022-05-01 ENCOUNTER — APPOINTMENT (OUTPATIENT)
Dept: GENERAL RADIOLOGY | Facility: HOSPITAL | Age: 67
End: 2022-05-01

## 2022-05-01 VITALS
RESPIRATION RATE: 18 BRPM | HEART RATE: 82 BPM | WEIGHT: 162.8 LBS | HEIGHT: 65 IN | BODY MASS INDEX: 27.12 KG/M2 | TEMPERATURE: 98.4 F | SYSTOLIC BLOOD PRESSURE: 161 MMHG | OXYGEN SATURATION: 97 % | DIASTOLIC BLOOD PRESSURE: 94 MMHG

## 2022-05-01 PROCEDURE — 73630 X-RAY EXAM OF FOOT: CPT

## 2022-05-01 NOTE — ED PROVIDER NOTES
Subjective   66-year-old diabetic male presents to the ED with a complaint of unilateral leg swelling.  States that about a week ago he was walking down his steps at home when his foot gave out and he fell down and caused an abrasion to his right lower leg.  Patient states that he also feels numbness in that leg but no pain and is able to walk on it but feels uncomfortable at times.  Patient states he has no history of blood clots, has not been immobile or have any recent surgeries or recent flights.  She denies any fevers or chills at this time.      History provided by:  Patient   used: No        Review of Systems   Cardiovascular: Positive for leg swelling.   Neurological: Positive for numbness.   All other systems reviewed and are negative.      Past Medical History:   Diagnosis Date   • Bronchitis    • COPD (chronic obstructive pulmonary disease) (HCC)    • Diabetes mellitus type I (HCC)    • Elevated liver enzymes     Chronic elevated liver enzymes with history of elevated alkaline phosphate as well as ALT and AST.   • Fatigue     Generalized fatigue, nondescript, not new, not better with exercise, not worse with exercise, not better with rest   • History of chemotherapy     Received 1 course of adjuvant carboplatin and Taxotere. Then we started carbo.Taxol off a 3 on, 1 off weekly regimen but he did not tolerate either of those and subsequentlly refused further adjuvant therapy. On follow-up scanning since that time.   • History of CT scan of chest 06/19/2014    CT of chest noncontrast showed no evidence of recurrence. There was stable diffuse scarring in the right middle and upper lung consistent with postoperative changes and stable obstructive emphysematous changes.   • History of MRI     MRI of brain negative   • Lung cancer (HCC) 05/2011    Stage IIA, T2N1, non-small cell lung cancer, status post wedge resection of a 3 cm primary, level 10 node positive, preop PET negative, and MRI of  "brain negative lung cancer. Ineligible for our MORAB trial due to the wedge resection.  Diagnosis was in May 2011. - No recurrence.   • Sinusitis    • Stress     regarding his occupation   • Type 2 diabetes mellitus with hyperglycemia, with long-term current use of insulin ([facility]) 2021       Allergies   Allergen Reactions   • Iodine GI Intolerance   • Other GI Intolerance     * IVP Dye       Past Surgical History:   Procedure Laterality Date   • CATARACT EXTRACTION  2021   • CATARACT EXTRACTION  2021   • KIDNEY STONE SURGERY     • LUNG CANCER SURGERY         Family History   Problem Relation Age of Onset   • Diabetes Mother    • Hashimoto's thyroiditis Sister        Social History     Socioeconomic History   • Marital status:    Tobacco Use   • Smoking status: Former Smoker     Packs/day: 3.00     Years: 37.00     Pack years: 111.00     Quit date:      Years since quittin.3   • Smokeless tobacco: Never Used   • Tobacco comment: \"He denies smoking.\"   Vaping Use   • Vaping Use: Never used   Substance and Sexual Activity   • Alcohol use: No   • Drug use: No   • Sexual activity: Defer     Comment: .           Objective   Physical Exam  Vitals and nursing note reviewed.   Constitutional:       Appearance: He is well-developed.   HENT:      Head: Normocephalic and atraumatic.   Cardiovascular:      Rate and Rhythm: Normal rate and regular rhythm.   Pulmonary:      Effort: Pulmonary effort is normal.      Breath sounds: Normal breath sounds.   Abdominal:      General: Bowel sounds are normal.      Palpations: Abdomen is soft.   Musculoskeletal:         General: Normal range of motion.      Cervical back: Normal range of motion.      Right lower leg: Swelling present. No tenderness.        Legs:       Comments: Healing wound   Skin:     General: Skin is warm and dry.   Neurological:      Mental Status: He is alert and oriented to person, place, and time.   Psychiatric:         " Behavior: Behavior normal.         Thought Content: Thought content normal.         Judgment: Judgment normal.         Procedures           ED Course                                                 MDM  Number of Diagnoses or Management Options  Abrasion of right lower extremity, initial encounter: new and requires workup  Leg edema, right: new and requires workup     Amount and/or Complexity of Data Reviewed  Tests in the radiology section of CPT®: reviewed  Independent visualization of images, tracings, or specimens: yes    Risk of Complications, Morbidity, and/or Mortality  Presenting problems: low  Diagnostic procedures: minimal  Management options: low    Patient Progress  Patient progress: stable      Final diagnoses:   Abrasion of right lower extremity, initial encounter   Leg edema, right       ED Disposition  ED Disposition     ED Disposition   Discharge    Condition   Stable    Comment   --             [facility] Emergency Department  [address] [place] [address] [phone]    If symptoms worsen         Medication List      No changes were made to your prescriptions during this visit.          Blas Perry Jr., PAOliverioC  05/01/22 0048

## 2022-05-02 DIAGNOSIS — R05.9 COUGH: Primary | ICD-10-CM

## 2022-05-03 ENCOUNTER — HOSPITAL ENCOUNTER (OUTPATIENT)
Dept: GENERAL RADIOLOGY | Facility: HOSPITAL | Age: 67
Discharge: HOME | End: 2022-05-03
Admitting: INTERNAL MEDICINE

## 2022-05-03 DIAGNOSIS — R05.9 COUGH: ICD-10-CM

## 2022-05-03 PROCEDURE — 71046 X-RAY EXAM CHEST 2 VIEWS: CPT

## 2022-05-12 ENCOUNTER — OFFICE VISIT (OUTPATIENT)
Dept: INTERNAL MEDICINE | Facility: CLINIC | Age: 67
End: 2022-05-12

## 2022-05-12 VITALS
HEIGHT: 65 IN | BODY MASS INDEX: 26.33 KG/M2 | OXYGEN SATURATION: 96 % | WEIGHT: 158 LBS | DIASTOLIC BLOOD PRESSURE: 60 MMHG | SYSTOLIC BLOOD PRESSURE: 110 MMHG | HEART RATE: 87 BPM | TEMPERATURE: 97.1 F

## 2022-05-12 DIAGNOSIS — F41.9 ANXIETY: ICD-10-CM

## 2022-05-12 DIAGNOSIS — E11.65 TYPE 2 DIABETES MELLITUS WITH HYPERGLYCEMIA, WITH LONG-TERM CURRENT USE OF INSULIN: ICD-10-CM

## 2022-05-12 DIAGNOSIS — Z79.4 TYPE 2 DIABETES MELLITUS WITH HYPERGLYCEMIA, WITH LONG-TERM CURRENT USE OF INSULIN: ICD-10-CM

## 2022-05-12 DIAGNOSIS — M79.89 LEG SWELLING: Primary | ICD-10-CM

## 2022-05-12 PROCEDURE — 99214 OFFICE O/P EST MOD 30 MIN: CPT | Performed by: INTERNAL MEDICINE

## 2022-05-12 NOTE — PROGRESS NOTES
"Subjective  Pascual Kaur is a 66 y.o. male    HPI coming in accompanied by his wife recent episode of lower extremity swelling has had intermittent coughing but the chest x-ray was unremarkable.  History of lung CA status post resection does not smoke now has diabetes with reasonably good control now has severe retinopathy with significantly impaired vision    The following portions of the patient's history were reviewed and updated as appropriate: allergies, current medications, past family history, past medical history, past social history, past surgical history, and problem list.     Review of Systems   Constitutional: Negative.  Negative for activity change, appetite change, fatigue and fever.   HENT: Negative for congestion, ear discharge, ear pain and trouble swallowing.    Eyes: Negative for photophobia and visual disturbance.   Respiratory: Negative for cough and shortness of breath.    Cardiovascular: Negative for chest pain and palpitations.   Gastrointestinal: Negative for abdominal distention, abdominal pain, constipation, diarrhea, nausea and vomiting.   Endocrine: Negative.    Genitourinary: Negative for dysuria, hematuria and urgency.   Musculoskeletal: Positive for arthralgias. Negative for back pain, joint swelling and myalgias.   Skin: Negative for color change and rash.   Allergic/Immunologic: Negative.    Neurological: Negative for dizziness, weakness, light-headedness and headaches.   Hematological: Negative for adenopathy. Does not bruise/bleed easily.   Psychiatric/Behavioral: Positive for sleep disturbance. Negative for agitation, confusion and dysphoric mood. The patient is not nervous/anxious.        Visit Vitals  /60   Pulse 87   Temp 97.1 °F (36.2 °C) (Infrared)   Ht 165.1 cm (65\")   Wt 71.7 kg (158 lb)   SpO2 96%   BMI 26.29 kg/m²       Objective  Physical Exam  Constitutional:       General: He is not in acute distress.     Appearance: He is well-developed.   HENT:    "   Nose: Nose normal.   Eyes:      General: No scleral icterus.     Conjunctiva/sclera: Conjunctivae normal.   Neck:      Thyroid: No thyromegaly.      Trachea: No tracheal deviation.   Cardiovascular:      Rate and Rhythm: Normal rate and regular rhythm.      Heart sounds: No murmur heard.    No friction rub.   Pulmonary:      Effort: No respiratory distress.      Breath sounds: No wheezing or rales.   Abdominal:      General: There is no distension.      Palpations: Abdomen is soft. There is no mass.      Tenderness: There is no abdominal tenderness. There is no guarding.   Musculoskeletal:         General: No deformity. Normal range of motion.   Lymphadenopathy:      Cervical: No cervical adenopathy.   Skin:     General: Skin is warm and dry.      Findings: Rash present. No erythema.   Neurological:      Mental Status: He is alert and oriented to person, place, and time.      Cranial Nerves: No cranial nerve deficit.      Coordination: Coordination normal.      Deep Tendon Reflexes: Reflexes are normal and symmetric.   Psychiatric:         Behavior: Behavior normal.         Thought Content: Thought content normal.         Judgment: Judgment normal.         Diagnoses and all orders for this visit:    Leg swelling resolved now counseled about low-sodium diet leg elevation if needed    Type 2 diabetes mellitus with hyperglycemia, with long-term current use of insulin (HCC) continue with the dietary restrictions and the current meds follow A1c    Anxiety counseled patient increase Lexapro to 10 mg for now he is to double up his 5 mg pills.  If this helps him will call in a new higher dose prescription

## 2022-05-25 ENCOUNTER — OFFICE VISIT (OUTPATIENT)
Dept: ENDOCRINOLOGY | Facility: CLINIC | Age: 67
End: 2022-05-25

## 2022-05-25 ENCOUNTER — LAB (OUTPATIENT)
Dept: LAB | Facility: HOSPITAL | Age: 67
End: 2022-05-25

## 2022-05-25 VITALS
HEART RATE: 94 BPM | BODY MASS INDEX: 25.16 KG/M2 | DIASTOLIC BLOOD PRESSURE: 60 MMHG | WEIGHT: 151 LBS | HEIGHT: 65 IN | SYSTOLIC BLOOD PRESSURE: 124 MMHG | OXYGEN SATURATION: 96 %

## 2022-05-25 DIAGNOSIS — E78.5 DYSLIPIDEMIA: ICD-10-CM

## 2022-05-25 DIAGNOSIS — Z79.4 TYPE 2 DIABETES MELLITUS WITH HYPERGLYCEMIA, WITH LONG-TERM CURRENT USE OF INSULIN: Primary | ICD-10-CM

## 2022-05-25 DIAGNOSIS — E11.65 TYPE 2 DIABETES MELLITUS WITH HYPERGLYCEMIA, WITH LONG-TERM CURRENT USE OF INSULIN: Primary | ICD-10-CM

## 2022-05-25 LAB
EXPIRATION DATE: ABNORMAL
EXPIRATION DATE: NORMAL
GLUCOSE BLDC GLUCOMTR-MCNC: 273 MG/DL (ref 70–130)
HBA1C MFR BLD: 7.1 %
Lab: ABNORMAL
Lab: NORMAL

## 2022-05-25 PROCEDURE — 82570 ASSAY OF URINE CREATININE: CPT | Performed by: INTERNAL MEDICINE

## 2022-05-25 PROCEDURE — 82947 ASSAY GLUCOSE BLOOD QUANT: CPT | Performed by: INTERNAL MEDICINE

## 2022-05-25 PROCEDURE — 83036 HEMOGLOBIN GLYCOSYLATED A1C: CPT | Performed by: INTERNAL MEDICINE

## 2022-05-25 PROCEDURE — 82043 UR ALBUMIN QUANTITATIVE: CPT | Performed by: INTERNAL MEDICINE

## 2022-05-25 PROCEDURE — 99214 OFFICE O/P EST MOD 30 MIN: CPT | Performed by: INTERNAL MEDICINE

## 2022-05-25 PROCEDURE — 80061 LIPID PANEL: CPT | Performed by: INTERNAL MEDICINE

## 2022-05-25 NOTE — PROGRESS NOTES
"Chief Complaint   Patient presents with   • Diabetes        HPI   Pascual Kaur is a 66 y.o. male had concerns including Diabetes.      Patient denies significant health changes in the interim since last visit.  He does report he has had to do short steroid taper psychedelic occasions due to arthralgias.  He is not currently taking steroids.  His wife reports glucose values generally  at home, no hypoglycemia.  Patient denies any current concerns with his medications.    Current diabetes medications include Levemir 30 units twice daily, Trulicity 0.7 mg weekly, NovoLog via correctional scale (patient reports he has not recently used this)    He continues on atorvastatin 20 mg daily for hyperlipidemia.  He denies myalgias or abdominal pain.    The following portions of the patient's history were reviewed and updated as appropriate: allergies, current medications and past social history.    Review of Systems   Constitutional: Negative for unexpected weight gain and unexpected weight loss.   Cardiovascular: Negative for palpitations.   Gastrointestinal: Negative for nausea and vomiting.   Endocrine: Negative for cold intolerance and heat intolerance.      /60 (BP Location: Left arm, Patient Position: Sitting, Cuff Size: Adult)   Pulse 94   Ht 165.1 cm (65\")   Wt 68.5 kg (151 lb)   SpO2 96%   BMI 25.13 kg/m²      Physical Exam      Constitutional:  well developed; well nourished  no acute distress   ENT/Thyroid: not examined   Eyes: Conjunctiva: clear   Respiratory:  breathing is unlabored  clear to auscultation bilaterally   Cardiovascular:  regular rate and rhythm   Chest:  Not performed.   Abdomen: soft, non-tender; no masses   : Not performed.   Musculoskeletal: Not performed   Skin: dry and warm   Neuro: normal without focal findings and mental status, speech normal   Psych: mood and affect are within normal limits       Labs/Imaging   Latest Reference Range & Units 05/25/22 11:08 " 05/25/22 11:09   Glucose 70 - 130 mg/dL 273 ! (C)    Hemoglobin A1C %  7.1   !: Data is abnormal  (C): Corrected    Diagnoses and all orders for this visit:    1. Type 2 diabetes mellitus with hyperglycemia, with long-term current use of insulin (HCC) (Primary)  -Hemoglobin A1c is 7.1, increased from prior.  Patient hyperglycemic during office visit  -Patient reports home glucose values at goal, he does report some steroid exposure in the interim since last visit.  -Plan to defer medication changes today as slight rise in A1c could been due to hyperglycemia while on steroids.  -Patient to continue Levemir 30 units twice daily  -Continue Trulicity 0.75 mg weekly  Patient was advised to monitor blood sugar 3 times daily.  Patient was instructed to bring glucometer to all future appointments. Patient should contact the clinic between appointments with hypoglycemia or persistent hyperglycemia.  Discussed signs and symptoms of hypoglycemia as well as hypoglycemia management via the rule of 15's.  Discussed potential for long-term complications with uncontrolled diabetes including nephropathy, neuropathy, retinopathy, increased risk for cardiac disease.  Discussed the role of diet and exercise in the management of diabetes.  CMP is up-to-date  Update lipids, urine micro today  -     POC Glucose, Blood  -     POC Glycosylated Hemoglobin (Hb A1C)  -     Microalbumin / Creatinine Urine Ratio - Urine, Clean Catch  -     Lipid Panel    2. Dyslipidemia  -Repeat lipid panel due, ordered  -Taking atorvastatin 40 mg daily      Return in about 4 months (around 9/25/2022) for Next scheduled follow up. The patient was instructed to contact the clinic with any interval questions or concerns.    Maryan Jarquin MD   Endocrinologist    Dictated Utilizing Dragon Dictation

## 2022-05-26 LAB
ALBUMIN UR-MCNC: 92.7 MG/DL
CHOLEST SERPL-MCNC: 94 MG/DL (ref 0–200)
CREAT UR-MCNC: 100.2 MG/DL
HDLC SERPL-MCNC: 26 MG/DL (ref 40–60)
LDLC SERPL CALC-MCNC: 32 MG/DL (ref 0–100)
LDLC/HDLC SERPL: 0.84 {RATIO}
MICROALBUMIN/CREAT UR: 925.1 MG/G
TRIGL SERPL-MCNC: 231 MG/DL (ref 0–150)
VLDLC SERPL-MCNC: 36 MG/DL (ref 5–40)

## 2022-06-06 RX ORDER — ESCITALOPRAM OXALATE 5 MG/1
5 TABLET ORAL DAILY
Qty: 90 TABLET | Refills: 3 | Status: SHIPPED | OUTPATIENT
Start: 2022-06-06

## 2022-07-28 DIAGNOSIS — Z79.4 TYPE 2 DIABETES MELLITUS WITH HYPERGLYCEMIA, WITH LONG-TERM CURRENT USE OF INSULIN: ICD-10-CM

## 2022-07-28 DIAGNOSIS — E11.65 TYPE 2 DIABETES MELLITUS WITH HYPERGLYCEMIA, WITH LONG-TERM CURRENT USE OF INSULIN: ICD-10-CM

## 2022-07-28 RX ORDER — DULAGLUTIDE 0.75 MG/.5ML
0.75 INJECTION, SOLUTION SUBCUTANEOUS WEEKLY
Qty: 2 ML | Refills: 5 | Status: SHIPPED | OUTPATIENT
Start: 2022-07-28 | End: 2022-10-05 | Stop reason: SDUPTHER

## 2022-10-03 ENCOUNTER — OFFICE VISIT (OUTPATIENT)
Dept: INTERNAL MEDICINE | Facility: CLINIC | Age: 67
End: 2022-10-03

## 2022-10-03 VITALS
HEIGHT: 65 IN | HEART RATE: 103 BPM | SYSTOLIC BLOOD PRESSURE: 114 MMHG | DIASTOLIC BLOOD PRESSURE: 66 MMHG | WEIGHT: 156 LBS | BODY MASS INDEX: 25.99 KG/M2 | OXYGEN SATURATION: 96 % | TEMPERATURE: 97.5 F

## 2022-10-03 DIAGNOSIS — M05.79 RHEUMATOID ARTHRITIS INVOLVING MULTIPLE SITES WITH POSITIVE RHEUMATOID FACTOR: ICD-10-CM

## 2022-10-03 DIAGNOSIS — J41.0 SIMPLE CHRONIC BRONCHITIS: ICD-10-CM

## 2022-10-03 DIAGNOSIS — F32.9 REACTIVE DEPRESSION: ICD-10-CM

## 2022-10-03 DIAGNOSIS — Z23 NEED FOR INFLUENZA VACCINATION: Primary | ICD-10-CM

## 2022-10-03 PROCEDURE — 90471 IMMUNIZATION ADMIN: CPT | Performed by: INTERNAL MEDICINE

## 2022-10-03 PROCEDURE — 90662 IIV NO PRSV INCREASED AG IM: CPT | Performed by: INTERNAL MEDICINE

## 2022-10-03 PROCEDURE — 99214 OFFICE O/P EST MOD 30 MIN: CPT | Performed by: INTERNAL MEDICINE

## 2022-10-03 RX ORDER — ALBUTEROL SULFATE 90 UG/1
2 AEROSOL, METERED RESPIRATORY (INHALATION) EVERY 4 HOURS PRN
Qty: 8.5 G | Refills: 2 | Status: SHIPPED | OUTPATIENT
Start: 2022-10-03

## 2022-10-03 RX ORDER — ADALIMUMAB 40MG/0.4ML
40 KIT SUBCUTANEOUS
COMMUNITY
Start: 2022-09-23

## 2022-10-03 NOTE — PROGRESS NOTES
"Subjective  Pascual Kaur is a 67 y.o. male    HPI coming in for follow-up accompanied by his wife he has a longstanding history of chronic bronchitis history of lung disease status post resection history of anxiety with depression history of rheumatoid arthritis for whichHe has been placed on Humira  The following portions of the patient's history were reviewed and updated as appropriate: allergies, current medications, past family history, past medical history, past social history, past surgical history, and problem list.     Review of Systems   Constitutional: Positive for fatigue. Negative for activity change, appetite change and fever.   HENT: Negative for congestion, ear discharge, ear pain and trouble swallowing.    Eyes: Negative for photophobia and visual disturbance.   Respiratory: Positive for cough and shortness of breath.    Cardiovascular: Negative for chest pain and palpitations.   Gastrointestinal: Negative for abdominal distention, abdominal pain, constipation, diarrhea, nausea and vomiting.   Endocrine: Negative.    Genitourinary: Negative for dysuria, hematuria and urgency.   Musculoskeletal: Positive for arthralgias. Negative for back pain, joint swelling and myalgias.   Skin: Negative for color change and rash.   Allergic/Immunologic: Negative.    Neurological: Negative for dizziness, weakness, light-headedness and headaches.   Hematological: Negative for adenopathy. Does not bruise/bleed easily.   Psychiatric/Behavioral: Positive for sleep disturbance. Negative for agitation, confusion and dysphoric mood. The patient is not nervous/anxious.        Visit Vitals  /66   Pulse 103   Temp 97.5 °F (36.4 °C)   Ht 165.1 cm (65\")   Wt 70.8 kg (156 lb)   SpO2 96%   BMI 25.96 kg/m²       Objective  Physical Exam  Constitutional:       General: He is not in acute distress.     Appearance: He is well-developed.   HENT:      Nose: Nose normal.   Eyes:      General: No scleral icterus.     " Conjunctiva/sclera: Conjunctivae normal.   Neck:      Thyroid: No thyromegaly.      Trachea: No tracheal deviation.   Cardiovascular:      Rate and Rhythm: Normal rate and regular rhythm.      Heart sounds: No murmur heard.    No friction rub.   Pulmonary:      Effort: No respiratory distress.      Breath sounds: No wheezing or rales.   Abdominal:      General: There is no distension.      Palpations: Abdomen is soft. There is no mass.      Tenderness: There is no abdominal tenderness. There is no guarding.   Musculoskeletal:         General: No deformity. Normal range of motion.   Lymphadenopathy:      Cervical: No cervical adenopathy.   Skin:     General: Skin is warm and dry.      Findings: No erythema or rash.   Neurological:      Mental Status: He is alert and oriented to person, place, and time.      Cranial Nerves: No cranial nerve deficit.      Coordination: Coordination normal.      Deep Tendon Reflexes: Reflexes are normal and symmetric.   Psychiatric:         Behavior: Behavior normal.         Thought Content: Thought content normal.         Judgment: Judgment normal.         Diagnoses and all orders for this visit:    Need for influenza vaccination  -     Fluzone High-Dose 65+yrs (2084-5288)    Simple chronic bronchitis (HCC) does not use tobacco.  Albuterol as needed O2 sat okay lung auscultation unremarkable    Reactive depression stable with current meds counseled about sleep hygiene    Rheumatoid arthritis involving multiple sites with positive rheumatoid factor (HCC) stable tolerating meds well    Other orders  -     Humira Pen 40 MG/0.4ML Pen-injector Kit; Starts 10/7

## 2022-10-05 ENCOUNTER — OFFICE VISIT (OUTPATIENT)
Dept: ENDOCRINOLOGY | Facility: CLINIC | Age: 67
End: 2022-10-05

## 2022-10-05 VITALS
BODY MASS INDEX: 27.11 KG/M2 | SYSTOLIC BLOOD PRESSURE: 124 MMHG | WEIGHT: 153 LBS | HEART RATE: 94 BPM | HEIGHT: 63 IN | OXYGEN SATURATION: 97 % | DIASTOLIC BLOOD PRESSURE: 70 MMHG

## 2022-10-05 DIAGNOSIS — Z79.4 TYPE 2 DIABETES MELLITUS WITH HYPERGLYCEMIA, WITH LONG-TERM CURRENT USE OF INSULIN: Primary | ICD-10-CM

## 2022-10-05 DIAGNOSIS — E78.5 HYPERLIPIDEMIA, UNSPECIFIED HYPERLIPIDEMIA TYPE: ICD-10-CM

## 2022-10-05 DIAGNOSIS — E11.65 TYPE 2 DIABETES MELLITUS WITH HYPERGLYCEMIA, WITH LONG-TERM CURRENT USE OF INSULIN: Primary | ICD-10-CM

## 2022-10-05 LAB
EXPIRATION DATE: ABNORMAL
EXPIRATION DATE: NORMAL
GLUCOSE BLDC GLUCOMTR-MCNC: 167 MG/DL (ref 70–130)
HBA1C MFR BLD: 7 %
Lab: ABNORMAL
Lab: NORMAL

## 2022-10-05 PROCEDURE — 99214 OFFICE O/P EST MOD 30 MIN: CPT | Performed by: INTERNAL MEDICINE

## 2022-10-05 PROCEDURE — 82947 ASSAY GLUCOSE BLOOD QUANT: CPT | Performed by: INTERNAL MEDICINE

## 2022-10-05 PROCEDURE — 83036 HEMOGLOBIN GLYCOSYLATED A1C: CPT | Performed by: INTERNAL MEDICINE

## 2022-10-05 RX ORDER — INSULIN DETEMIR 100 [IU]/ML
30 INJECTION, SOLUTION SUBCUTANEOUS 2 TIMES DAILY
Qty: 30 ML | Refills: 5 | Status: SHIPPED | OUTPATIENT
Start: 2022-10-05

## 2022-10-05 RX ORDER — DULAGLUTIDE 0.75 MG/.5ML
0.75 INJECTION, SOLUTION SUBCUTANEOUS WEEKLY
Qty: 2 ML | Refills: 5 | Status: SHIPPED | OUTPATIENT
Start: 2022-10-05 | End: 2023-04-04

## 2022-10-05 NOTE — PROGRESS NOTES
"Chief Complaint   Patient presents with   • Diabetes        HPI   Pascual Kaur is a 67 y.o. male had concerns including Diabetes.      Patient reports no new concerns regarding diabetes.  He is going to be starting Humira in the next few weeks.  No glycemic data is available for review but patient denies any hypoglycemia or significant hyperglycemia.  He has intermittently used prednisone in the interim since last visit for rheumatologic concerns.    Current diabetes medications include the following  Levemir 30 units twice daily  Trulicity 0.75 mg weekly  NovoLog via correctional scale, patient reports he has not had to use this recently.    Patient continues on atorvastatin 20 mg daily for hyperlipidemia.     The following portions of the patient's history were reviewed and updated as appropriate: allergies, current medications and past social history.    Review of Systems   Gastrointestinal: Negative for abdominal pain, nausea and vomiting.   Endocrine: Negative for polydipsia and polyuria.      /70 (BP Location: Left arm, Patient Position: Sitting, Cuff Size: Adult)   Pulse 94   Ht 160 cm (63\")   Wt 69.4 kg (153 lb)   SpO2 97%   BMI 27.10 kg/m²      Physical Exam      Constitutional:  well developed; well nourished  no acute distress   ENT/Thyroid: not examined   Eyes: Conjunctiva: clear   Respiratory:  breathing is unlabored  clear to auscultation bilaterally   Cardiovascular:  regular rate and rhythm   Chest:  Not performed.   Abdomen: soft, non-tender; no masses   : Not performed.   Musculoskeletal: Not performed   Skin: not performed.   Neuro: normal without focal findings   Psych: Responds appropriately to questions       Labs/Imaging   Latest Reference Range & Units 05/25/22 11:51   Total Cholesterol 0 - 200 mg/dL 94   HDL Cholesterol 40 - 60 mg/dL 26 (L)   LDL Cholesterol  0 - 100 mg/dL 32   VLDL Cholesterol 5 - 40 mg/dL 36   Triglycerides 0 - 150 mg/dL 231 (H)   LDL/HDL Ratio  " 0.84   Creatinine, Urine mg/dL 100.2   Microalbumin, Urine mg/dL 92.7   Microalbumin/Creatinine Ratio mg/g 925.1   (L): Data is abnormally low  (H): Data is abnormally high     Latest Reference Range & Units 10/05/22 12:38   Glucose 70 - 130 mg/dL 167 !   Hemoglobin A1C % 7.0   !: Data is abnormal    Diagnoses and all orders for this visit:    1. Type 2 diabetes mellitus with hyperglycemia, with long-term current use of insulin ([facility]) (Primary)  Hemoglobin A1c is at goal, 7%  Patient mildly hypoglycemic during visit, no home glycemic data available for review.  Patient denies hypoglycemia  Plan to continue Levemir 30 units twice daily  Plan to continue Trulicity 0.75 mg weekly  Plan to continue NovoLog as needed per correctional scale  Patient was advised to monitor blood sugar 3 times daily.  Patient was instructed to bring glucometer to all future appointments. Patient should contact the clinic between appointments with hypoglycemia or persistent hyperglycemia.  Discussed signs and symptoms of hypoglycemia as well as hypoglycemia management via the rule of 15's.  Discussed potential for long-term complications with uncontrolled diabetes including nephropathy, neuropathy, retinopathy, increased risk for cardiac disease.  Discussed the role of diet and exercise in the management of diabetes.  Screening labs are up-to-date  Patient reports eye exam up-to-date from the past year  -     POC Glucose, Blood  -     POC Glycosylated Hemoglobin (Hb A1C)  -     Dulaglutide (Trulicity) 0.75 MG/0.5ML solution pen-injector; Inject 0.75 mg under the skin into the appropriate area as directed 1 (One) Time Per Week.  Dispense: 2 mL; Refill: 5    2. Hyperlipidemia, unspecified hyperlipidemia type  Lipid panel is up-to-date from May 2022, continue atorvastatin    Other orders  -     insulin detemir (Levemir FlexTouch) 100 UNIT/ML injection; Inject 30 Units under the skin into the appropriate area as directed 2 (Two) Times a Day.   Dispense: 30 mL; Refill: 5        Return in about 6 months (around 4/5/2023). The patient was instructed to contact the clinic with any interval questions or concerns.    Maryan Jarquin MD   Endocrinologist    Dictated Utilizing Dragon Dictation

## 2022-10-29 PROCEDURE — U0004 COV-19 TEST NON-CDC HGH THRU: HCPCS | Performed by: PHYSICIAN ASSISTANT

## 2023-02-13 DIAGNOSIS — K21.9 GASTROESOPHAGEAL REFLUX DISEASE WITHOUT ESOPHAGITIS: ICD-10-CM

## 2023-02-14 RX ORDER — OMEPRAZOLE 40 MG/1
40 CAPSULE, DELAYED RELEASE ORAL DAILY
Qty: 90 CAPSULE | Refills: 0 | Status: SHIPPED | OUTPATIENT
Start: 2023-02-14

## 2023-02-14 RX ORDER — ATORVASTATIN CALCIUM 20 MG/1
20 TABLET, FILM COATED ORAL DAILY
Qty: 90 TABLET | Refills: 0 | Status: SHIPPED | OUTPATIENT
Start: 2023-02-14

## 2023-03-16 ENCOUNTER — APPOINTMENT (OUTPATIENT)
Dept: GENERAL RADIOLOGY | Facility: HOSPITAL | Age: 68
End: 2023-03-16
Payer: COMMERCIAL

## 2023-03-16 ENCOUNTER — APPOINTMENT (OUTPATIENT)
Dept: MRI IMAGING | Facility: HOSPITAL | Age: 68
End: 2023-03-16
Payer: COMMERCIAL

## 2023-03-16 ENCOUNTER — HOSPITAL ENCOUNTER (OUTPATIENT)
Facility: HOSPITAL | Age: 68
Setting detail: OBSERVATION
Discharge: HOME | End: 2023-03-17
Attending: EMERGENCY MEDICINE | Admitting: FAMILY MEDICINE
Payer: COMMERCIAL

## 2023-03-16 ENCOUNTER — APPOINTMENT (OUTPATIENT)
Dept: CT IMAGING | Facility: HOSPITAL | Age: 68
End: 2023-03-16
Payer: COMMERCIAL

## 2023-03-16 DIAGNOSIS — N17.9 AKI (ACUTE KIDNEY INJURY): ICD-10-CM

## 2023-03-16 DIAGNOSIS — R42 DIZZINESS: Primary | ICD-10-CM

## 2023-03-16 LAB
ALBUMIN SERPL-MCNC: 4.1 G/DL (ref 3.5–5.2)
ALBUMIN/GLOB SERPL: 1.2 G/DL
ALP SERPL-CCNC: 138 U/L (ref 39–117)
ALT SERPL W P-5'-P-CCNC: 21 U/L (ref 1–41)
ANION GAP SERPL CALCULATED.3IONS-SCNC: 11.8 MMOL/L (ref 5–15)
AST SERPL-CCNC: 25 U/L (ref 1–40)
BACTERIA UR QL AUTO: ABNORMAL /HPF
BASOPHILS # BLD AUTO: 0.06 10*3/MM3 (ref 0–0.2)
BASOPHILS NFR BLD AUTO: 0.7 % (ref 0–1.5)
BILIRUB SERPL-MCNC: 0.4 MG/DL (ref 0–1.2)
BILIRUB UR QL STRIP: NEGATIVE
BUN SERPL-MCNC: 26 MG/DL (ref 8–23)
BUN/CREAT SERPL: 12.8 (ref 7–25)
CALCIUM SPEC-SCNC: 9.1 MG/DL (ref 8.6–10.5)
CHLORIDE SERPL-SCNC: 96 MMOL/L (ref 98–107)
CLARITY UR: CLEAR
CO2 SERPL-SCNC: 27.2 MMOL/L (ref 22–29)
COLOR UR: YELLOW
CREAT SERPL-MCNC: 2.03 MG/DL (ref 0.76–1.27)
DEPRECATED RDW RBC AUTO: 41.8 FL (ref 37–54)
EGFRCR SERPLBLD CKD-EPI 2021: 35.5 ML/MIN/1.73
EOSINOPHIL # BLD AUTO: 0.24 10*3/MM3 (ref 0–0.4)
EOSINOPHIL NFR BLD AUTO: 2.9 % (ref 0.3–6.2)
ERYTHROCYTE [DISTWIDTH] IN BLOOD BY AUTOMATED COUNT: 13.8 % (ref 12.3–15.4)
FLUAV SUBTYP SPEC NAA+PROBE: NOT DETECTED
FLUBV RNA ISLT QL NAA+PROBE: NOT DETECTED
GLOBULIN UR ELPH-MCNC: 3.5 GM/DL
GLUCOSE SERPL-MCNC: 90 MG/DL (ref 65–99)
GLUCOSE UR STRIP-MCNC: NEGATIVE MG/DL
HBA1C MFR BLD: 7.7 % (ref 4.8–5.6)
HCT VFR BLD AUTO: 41.5 % (ref 37.5–51)
HGB BLD-MCNC: 13.9 G/DL (ref 13–17.7)
HGB UR QL STRIP.AUTO: NEGATIVE
HOLD SPECIMEN: NORMAL
HOLD SPECIMEN: NORMAL
HYALINE CASTS UR QL AUTO: ABNORMAL /LPF
IMM GRANULOCYTES # BLD AUTO: 0.03 10*3/MM3 (ref 0–0.05)
IMM GRANULOCYTES NFR BLD AUTO: 0.4 % (ref 0–0.5)
KETONES UR QL STRIP: NEGATIVE
LEUKOCYTE ESTERASE UR QL STRIP.AUTO: NEGATIVE
LYMPHOCYTES # BLD AUTO: 2.77 10*3/MM3 (ref 0.7–3.1)
LYMPHOCYTES NFR BLD AUTO: 33.9 % (ref 19.6–45.3)
MAGNESIUM SERPL-MCNC: 2 MG/DL (ref 1.6–2.4)
MCH RBC QN AUTO: 28.5 PG (ref 26.6–33)
MCHC RBC AUTO-ENTMCNC: 33.5 G/DL (ref 31.5–35.7)
MCV RBC AUTO: 85 FL (ref 79–97)
MONOCYTES # BLD AUTO: 0.84 10*3/MM3 (ref 0.1–0.9)
MONOCYTES NFR BLD AUTO: 10.3 % (ref 5–12)
MUCOUS THREADS URNS QL MICRO: ABNORMAL /HPF
NEUTROPHILS NFR BLD AUTO: 4.22 10*3/MM3 (ref 1.7–7)
NEUTROPHILS NFR BLD AUTO: 51.8 % (ref 42.7–76)
NITRITE UR QL STRIP: NEGATIVE
NRBC BLD AUTO-RTO: 0 /100 WBC (ref 0–0.2)
PH UR STRIP.AUTO: 5.5 [PH] (ref 5–8)
PLATELET # BLD AUTO: 282 10*3/MM3 (ref 140–450)
PMV BLD AUTO: 8.9 FL (ref 6–12)
POTASSIUM SERPL-SCNC: 3.4 MMOL/L (ref 3.5–5.2)
PROT SERPL-MCNC: 7.6 G/DL (ref 6–8.5)
PROT UR QL STRIP: ABNORMAL
RBC # BLD AUTO: 4.88 10*6/MM3 (ref 4.14–5.8)
RBC # UR STRIP: ABNORMAL /HPF
REF LAB TEST METHOD: ABNORMAL
SARS-COV-2 RNA RESP QL NAA+PROBE: NOT DETECTED
SODIUM SERPL-SCNC: 135 MMOL/L (ref 136–145)
SP GR UR STRIP: 1.02 (ref 1–1.03)
SQUAMOUS #/AREA URNS HPF: ABNORMAL /HPF
TROPONIN T SERPL HS-MCNC: 33 NG/L
TROPONIN T SERPL HS-MCNC: 38 NG/L
UROBILINOGEN UR QL STRIP: ABNORMAL
WBC # UR STRIP: ABNORMAL /HPF
WBC NRBC COR # BLD: 8.16 10*3/MM3 (ref 3.4–10.8)
WHOLE BLOOD HOLD COAG: NORMAL
WHOLE BLOOD HOLD SPECIMEN: NORMAL

## 2023-03-16 PROCEDURE — 96361 HYDRATE IV INFUSION ADD-ON: CPT

## 2023-03-16 PROCEDURE — 85025 COMPLETE CBC W/AUTO DIFF WBC: CPT | Performed by: EMERGENCY MEDICINE

## 2023-03-16 PROCEDURE — G0378 HOSPITAL OBSERVATION PER HR: HCPCS

## 2023-03-16 PROCEDURE — 84484 ASSAY OF TROPONIN QUANT: CPT | Performed by: EMERGENCY MEDICINE

## 2023-03-16 PROCEDURE — 99223 1ST HOSP IP/OBS HIGH 75: CPT | Performed by: FAMILY MEDICINE

## 2023-03-16 PROCEDURE — 0 GADOBENATE DIMEGLUMINE 529 MG/ML SOLUTION: Performed by: EMERGENCY MEDICINE

## 2023-03-16 PROCEDURE — 70450 CT HEAD/BRAIN W/O DYE: CPT

## 2023-03-16 PROCEDURE — 70553 MRI BRAIN STEM W/O & W/DYE: CPT

## 2023-03-16 PROCEDURE — 83735 ASSAY OF MAGNESIUM: CPT | Performed by: EMERGENCY MEDICINE

## 2023-03-16 PROCEDURE — 99285 EMERGENCY DEPT VISIT HI MDM: CPT

## 2023-03-16 PROCEDURE — A9577 INJ MULTIHANCE: HCPCS | Performed by: EMERGENCY MEDICINE

## 2023-03-16 PROCEDURE — 81001 URINALYSIS AUTO W/SCOPE: CPT | Performed by: EMERGENCY MEDICINE

## 2023-03-16 PROCEDURE — 83036 HEMOGLOBIN GLYCOSYLATED A1C: CPT | Performed by: FAMILY MEDICINE

## 2023-03-16 PROCEDURE — 84484 ASSAY OF TROPONIN QUANT: CPT | Performed by: PHYSICIAN ASSISTANT

## 2023-03-16 PROCEDURE — 93005 ELECTROCARDIOGRAM TRACING: CPT | Performed by: EMERGENCY MEDICINE

## 2023-03-16 PROCEDURE — 71045 X-RAY EXAM CHEST 1 VIEW: CPT

## 2023-03-16 PROCEDURE — 99233 SBSQ HOSP IP/OBS HIGH 50: CPT | Performed by: PSYCHIATRY & NEUROLOGY

## 2023-03-16 PROCEDURE — 96374 THER/PROPH/DIAG INJ IV PUSH: CPT

## 2023-03-16 PROCEDURE — 25010000002 ONDANSETRON PER 1 MG: Performed by: EMERGENCY MEDICINE

## 2023-03-16 PROCEDURE — 36415 COLL VENOUS BLD VENIPUNCTURE: CPT

## 2023-03-16 PROCEDURE — 87636 SARSCOV2 & INF A&B AMP PRB: CPT | Performed by: PHYSICIAN ASSISTANT

## 2023-03-16 PROCEDURE — C9803 HOPD COVID-19 SPEC COLLECT: HCPCS

## 2023-03-16 PROCEDURE — 74176 CT ABD & PELVIS W/O CONTRAST: CPT

## 2023-03-16 PROCEDURE — 80053 COMPREHEN METABOLIC PANEL: CPT | Performed by: EMERGENCY MEDICINE

## 2023-03-16 RX ORDER — POLYETHYLENE GLYCOL 3350 17 G/17G
17 POWDER, FOR SOLUTION ORAL DAILY PRN
Status: DISCONTINUED | OUTPATIENT
Start: 2023-03-16 | End: 2023-03-17 | Stop reason: HOSPADM

## 2023-03-16 RX ORDER — DEXTROSE MONOHYDRATE 25 G/50ML
25 INJECTION, SOLUTION INTRAVENOUS
Status: DISCONTINUED | OUTPATIENT
Start: 2023-03-16 | End: 2023-03-17 | Stop reason: HOSPADM

## 2023-03-16 RX ORDER — CHOLECALCIFEROL (VITAMIN D3) 125 MCG
5 CAPSULE ORAL NIGHTLY PRN
Status: DISCONTINUED | OUTPATIENT
Start: 2023-03-16 | End: 2023-03-17 | Stop reason: HOSPADM

## 2023-03-16 RX ORDER — SODIUM CHLORIDE 0.9 % (FLUSH) 0.9 %
3 SYRINGE (ML) INJECTION EVERY 12 HOURS SCHEDULED
Status: DISCONTINUED | OUTPATIENT
Start: 2023-03-17 | End: 2023-03-17 | Stop reason: HOSPADM

## 2023-03-16 RX ORDER — SODIUM CHLORIDE 9 MG/ML
40 INJECTION, SOLUTION INTRAVENOUS AS NEEDED
Status: DISCONTINUED | OUTPATIENT
Start: 2023-03-16 | End: 2023-03-17 | Stop reason: HOSPADM

## 2023-03-16 RX ORDER — ACETAMINOPHEN 160 MG/5ML
650 SOLUTION ORAL EVERY 4 HOURS PRN
Status: DISCONTINUED | OUTPATIENT
Start: 2023-03-16 | End: 2023-03-17 | Stop reason: HOSPADM

## 2023-03-16 RX ORDER — AMOXICILLIN 250 MG
2 CAPSULE ORAL 2 TIMES DAILY
Status: DISCONTINUED | OUTPATIENT
Start: 2023-03-17 | End: 2023-03-17 | Stop reason: HOSPADM

## 2023-03-16 RX ORDER — HEPARIN SODIUM 5000 [USP'U]/ML
5000 INJECTION, SOLUTION INTRAVENOUS; SUBCUTANEOUS EVERY 12 HOURS SCHEDULED
Status: DISCONTINUED | OUTPATIENT
Start: 2023-03-17 | End: 2023-03-17 | Stop reason: HOSPADM

## 2023-03-16 RX ORDER — ONDANSETRON 2 MG/ML
4 INJECTION INTRAMUSCULAR; INTRAVENOUS EVERY 6 HOURS PRN
Status: DISCONTINUED | OUTPATIENT
Start: 2023-03-16 | End: 2023-03-17 | Stop reason: HOSPADM

## 2023-03-16 RX ORDER — ONDANSETRON 2 MG/ML
4 INJECTION INTRAMUSCULAR; INTRAVENOUS ONCE
Status: COMPLETED | OUTPATIENT
Start: 2023-03-16 | End: 2023-03-16

## 2023-03-16 RX ORDER — SODIUM CHLORIDE 9 MG/ML
150 INJECTION, SOLUTION INTRAVENOUS CONTINUOUS
Status: DISCONTINUED | OUTPATIENT
Start: 2023-03-16 | End: 2023-03-17 | Stop reason: HOSPADM

## 2023-03-16 RX ORDER — ACETAMINOPHEN 650 MG/1
650 SUPPOSITORY RECTAL EVERY 4 HOURS PRN
Status: DISCONTINUED | OUTPATIENT
Start: 2023-03-16 | End: 2023-03-17 | Stop reason: HOSPADM

## 2023-03-16 RX ORDER — ALBUTEROL SULFATE 2.5 MG/3ML
2.5 SOLUTION RESPIRATORY (INHALATION) EVERY 6 HOURS PRN
Status: DISCONTINUED | OUTPATIENT
Start: 2023-03-16 | End: 2023-03-17 | Stop reason: HOSPADM

## 2023-03-16 RX ORDER — ACETAMINOPHEN 325 MG/1
650 TABLET ORAL EVERY 4 HOURS PRN
Status: DISCONTINUED | OUTPATIENT
Start: 2023-03-16 | End: 2023-03-17 | Stop reason: HOSPADM

## 2023-03-16 RX ORDER — ATORVASTATIN CALCIUM 20 MG/1
20 TABLET, FILM COATED ORAL DAILY
Status: DISCONTINUED | OUTPATIENT
Start: 2023-03-17 | End: 2023-03-17 | Stop reason: HOSPADM

## 2023-03-16 RX ORDER — ESCITALOPRAM OXALATE 10 MG/1
5 TABLET ORAL DAILY
Status: DISCONTINUED | OUTPATIENT
Start: 2023-03-17 | End: 2023-03-17 | Stop reason: HOSPADM

## 2023-03-16 RX ORDER — SODIUM CHLORIDE 0.9 % (FLUSH) 0.9 %
3-10 SYRINGE (ML) INJECTION AS NEEDED
Status: DISCONTINUED | OUTPATIENT
Start: 2023-03-16 | End: 2023-03-17 | Stop reason: HOSPADM

## 2023-03-16 RX ORDER — ASPIRIN 81 MG/1
81 TABLET, CHEWABLE ORAL DAILY
Status: DISCONTINUED | OUTPATIENT
Start: 2023-03-17 | End: 2023-03-17 | Stop reason: HOSPADM

## 2023-03-16 RX ORDER — BISACODYL 10 MG
10 SUPPOSITORY, RECTAL RECTAL DAILY PRN
Status: DISCONTINUED | OUTPATIENT
Start: 2023-03-16 | End: 2023-03-17 | Stop reason: HOSPADM

## 2023-03-16 RX ORDER — NICOTINE POLACRILEX 4 MG
15 LOZENGE BUCCAL
Status: DISCONTINUED | OUTPATIENT
Start: 2023-03-16 | End: 2023-03-17 | Stop reason: HOSPADM

## 2023-03-16 RX ORDER — SODIUM CHLORIDE 0.9 % (FLUSH) 0.9 %
10 SYRINGE (ML) INJECTION AS NEEDED
Status: DISCONTINUED | OUTPATIENT
Start: 2023-03-16 | End: 2023-03-17 | Stop reason: HOSPADM

## 2023-03-16 RX ORDER — INSULIN ASPART 100 [IU]/ML
0-14 INJECTION, SOLUTION INTRAVENOUS; SUBCUTANEOUS
Status: DISCONTINUED | OUTPATIENT
Start: 2023-03-17 | End: 2023-03-17 | Stop reason: HOSPADM

## 2023-03-16 RX ORDER — BISACODYL 5 MG/1
5 TABLET, DELAYED RELEASE ORAL DAILY PRN
Status: DISCONTINUED | OUTPATIENT
Start: 2023-03-16 | End: 2023-03-17 | Stop reason: HOSPADM

## 2023-03-16 RX ADMIN — SODIUM CHLORIDE 150 ML/HR: 9 INJECTION, SOLUTION INTRAVENOUS at 18:22

## 2023-03-16 RX ADMIN — SODIUM CHLORIDE 1000 ML: 9 INJECTION, SOLUTION INTRAVENOUS at 14:40

## 2023-03-16 RX ADMIN — ONDANSETRON 4 MG: 2 INJECTION INTRAMUSCULAR; INTRAVENOUS at 13:59

## 2023-03-16 RX ADMIN — GADOBENATE DIMEGLUMINE 15 ML: 529 INJECTION, SOLUTION INTRAVENOUS at 16:12

## 2023-03-16 RX ADMIN — Medication 3 ML: at 23:38

## 2023-03-16 NOTE — CONSULTS
DOS: 3/16/2023  NAME: Pascual Kaur   : 1955  PCP: Humberto Kumari MD  CC: Headaches with lightheadedness and feeling off balance  Referring MD: Kole Fu DO    Neurological Problem and Interval History:  67 y.o. right-handed male with a Hx of emphysema, lung cancer stage II, type 2 diabetes currently on Trulicity as well as elevated liver enzymes was brought into the emergency room by his family as over the last 2 to 3 days he has been experiencing a lot of nausea vomiting and diarrhea.  As per his wife all his family members had a GI bug and were sick but they are all self-limited.  He is still recuperating from this and this morning he felt extremely nauseated and was vomiting and felt lightheaded and was having a terrible headache and was off balance and felt vertiginous.    He has been hydrated and currently when I examined him through the telemedicine device he seems to be back to baseline and his NIH stroke scale is 0.  His mentation is normal and he follows all commands well and he was able to get out of the bed by himself and walk around the bed by himself and his Romberg was negative but he felt lightheaded when he was standing for a prolonged interval of time.    Past Medical/Surgical Hx:  Past Medical History:   Diagnosis Date   • Bronchitis    • COPD (chronic obstructive pulmonary disease) (HCC)    • Diabetes mellitus type I (HCC)    • Elevated liver enzymes     Chronic elevated liver enzymes with history of elevated alkaline phosphate as well as ALT and AST.   • Fatigue     Generalized fatigue, nondescript, not new, not better with exercise, not worse with exercise, not better with rest   • History of chemotherapy     Received 1 course of adjuvant carboplatin and Taxotere. Then we started carbo.Taxol off a 3 on, 1 off weekly regimen but he did not tolerate either of those and subsequentlly refused further adjuvant therapy. On follow-up scanning since that time.   • History of CT  scan of chest 06/19/2014    CT of chest noncontrast showed no evidence of recurrence. There was stable diffuse scarring in the right middle and upper lung consistent with postoperative changes and stable obstructive emphysematous changes.   • History of MRI     MRI of brain negative   • Lung cancer (HCC) 05/2011    Stage IIA, T2N1, non-small cell lung cancer, status post wedge resection of a 3 cm primary, level 10 node positive, preop PET negative, and MRI of brain negative lung cancer. Ineligible for our MORAB trial due to the wedge resection.  Diagnosis was in May 2011. - No recurrence.   • Sinusitis    • Stress     regarding his occupation   • Type 2 diabetes mellitus with hyperglycemia, with long-term current use of insulin ([facility]) 2/4/2021     Past Surgical History:   Procedure Laterality Date   • CATARACT EXTRACTION  03/08/2021   • CATARACT EXTRACTION  04/06/2021   • KIDNEY STONE SURGERY     • LUNG CANCER SURGERY         Review of Systems:    Constitutional: Pleasant gentleman currently recovering from a bout of diarrhea for the last 2 days along with dehydration.  Cardiovascular: Denies any chest pain or palpitations.  Respiratory: Denies any shortness of breath.  Gastrointestinal: Earlier had a lot of nausea vomiting and diarrhea.  Genitourinary: Denies any bladder incontinence.  Musculoskeletal: Denies any aches and pains in the muscles or joints.  Dermatological: No skin breakdown noted.  Neurological: No focal neurological deficits.  Psychiatric: Denies any major anxiety or depression.  Ophthalmological: Denies any visual changes.          Medications On Admission  (Not in a hospital admission)      Allergies:  Allergies   Allergen Reactions   • Iodine GI Intolerance   • Other GI Intolerance     * IVP Dye       Social Hx:  Social History     Socioeconomic History   • Marital status:    Tobacco Use   • Smoking status: Former     Packs/day: 3.00     Years: 37.00     Pack years: 111.00     Types:  "Cigarettes     Quit date:      Years since quittin.2   • Smokeless tobacco: Never   • Tobacco comments:     \"He denies smoking.\"   Vaping Use   • Vaping Use: Never used   Substance and Sexual Activity   • Alcohol use: No   • Drug use: No   • Sexual activity: Defer     Comment: .       Family Hx:  Family History   Problem Relation Age of Onset   • Diabetes Mother    • Hashimoto's thyroiditis Sister        Review of Imaging (Interpretation of images not reports): Reviewed the neuroimaging in details on the PACS that shows the following:    FINDINGS: Multiplanar MR imaging of the head was performed  without and  with contrast.  Additional thin section pre and postcontrast images of  the cerebellopontine angles and internal auditory canals were obtained.    There is no evidence of intracranial hemorrhage or mass.  The  ventricles are within normal limits with respect to size.  There is  no  evidence of shift of the midline structures.   No abnormal extra-axial  fluid collection is seen. The posterior fossa and brainstem have an  unremarkable appearance.  No abnormal contrast enhancement is  identified. On the diffusion weighted images,  no abnormal restricted  diffusion is seen.  Normal major vessel vascular flow voids are  identified.      There is no evidence of cerebellopontine angle mass or abnormal contrast  enhancement. The 7th and 8th nerve complexes appear normal. No mass or  abnormal contrast enhancement is seen in the internal auditory canals.     IMPRESSION:  No acute intracranial abnormality.     Additional Tests Performed: The CT of the head without contrast which shows the following:    COMPARISON:   None     FINDINGS:   No acute intracranial hemorrhage or large acute cortical infarct. Mild  chronic small vessel ischemic white matter changes are present. No  significant cerebral volume loss. Ventricles are normal in size and  configuration. No midline shift. The basal cisterns are " "patent.  Intracranial arterial atherosclerotic calcifications.     No skull fracture. The visualized paranasal sinuses and mastoid air  cells are clear.     IMPRESSION:  No acute intracranial hemorrhage or large acute cortical infarct.       Laboratory Results:   Lab Results   Component Value Date    GLUCOSE 90 03/16/2023    CALCIUM 9.1 03/16/2023     (L) 03/16/2023    K 3.4 (L) 03/16/2023    CO2 27.2 03/16/2023    CL 96 (L) 03/16/2023    BUN 26 (H) 03/16/2023    CREATININE 2.03 (H) 03/16/2023    EGFRIFAFRI 107 06/29/2020    EGFRIFNONA 84 11/30/2021    BCR 12.8 03/16/2023    ANIONGAP 11.8 03/16/2023     Lab Results   Component Value Date    WBC 8.16 03/16/2023    HGB 13.9 03/16/2023    HCT 41.5 03/16/2023    MCV 85.0 03/16/2023     03/16/2023     Lab Results   Component Value Date    CHOL 94 05/25/2022    CHOL 105 04/19/2021     Lab Results   Component Value Date    HDL 26 (L) 05/25/2022    HDL 28 (L) 04/19/2021    HDL 32 (L) 06/29/2020     Lab Results   Component Value Date    LDL 32 05/25/2022    LDL 54 04/19/2021    LDL 37 06/29/2020     Lab Results   Component Value Date    TRIG 231 (H) 05/25/2022    TRIG 129 04/19/2021    TRIG 191 (H) 06/29/2020     Lab Results   Component Value Date    HGBA1C 7.0 10/05/2022         Physical Examination:  /90   Pulse 89   Temp 98.4 °F (36.9 °C) (Oral)   Resp 18   Ht 167.6 cm (66\")   Wt 74.8 kg (165 lb)   SpO2 94%   BMI 26.63 kg/m²   General Appearance:   Well developed, well nourished, well groomed, alert, and cooperative.  HEENT: Normocephalic.  .  Neck and Spine: Normal range of motion.  Normal alignment. No mass or tenderness. No bruits.  Cardiac: Regular rate and rhythm. No murmurs.  Peripheral Vasculature: Radial and pedal pulses are equal and symmetric. No signs of distal embolization.  Extremities:    No edema or deformities. Normal joint ROM. ALVIN hoses and SCD's in place  Skin:    No rashes or birth marks.    Neurological examination:  Higher " Integrative  Function: Oriented to time, place and person. Normal registration, recall, attention span and concentration. Normal language including comprehension, spontaneous speech, repetition, reading, writing, naming and vocabulary. No neglect with normal visual-spatial function and construction. Normal fund of knowledge and higher integrative function.  CN II: Pupils are equal, round, and reactive to light. Normal visual acuity and visual fields.    CN III IV VI: Extraocular movements are full without nystagmus.   CN V: Normal facial sensation and strength of muscles of mastication.  CN VII: Facial movements are symmetric. No weakness.  CN VIII:   Auditory acuity is normal.  CN IX & X:   Symmetric palatal movement.  CN XI: Sternocleidomastoid and trapezius are normal.  No weakness.  CN XII:   The tongue is midline.  No atrophy or fasciculations.  Motor: Normal muscle strength, bulk and tone in upper and lower extremities.  No fasciculations, rigidity, spasticity, or abnormal movements.  Sensation: Normal to light touch, pinprick, vibration, temperature, and proprioception in arms and legs. Normal graphesthesia and no extinction on DSS.  Station and Gait: Normal gait and station.  Romberg is negative although he felt lightheaded.  Coordination: Finger to nose test shows no dysmetria.      Diagnoses / Discussion:  67 y.o. who presents with Sx of dehydration resulting in feeling off balance, headaches and vertiginous feelings.  The patient has been hydrated and feels a lot better.    Plan:  Discussed with the patient and his wife and also the physician assistant with Dr. Kole Fu that he will be better off getting observed with the hospitalist overnight and being hydrated so that the gadolinium dye could be flushed out of his kidneys.  Also he is diabetic and takes Trulicity which can compound the problems with his kidneys and so it is better for him to be hydrated and tomorrow if he is feeling better and the  renal functions have improved he can be readily discharged home.  It has been noted that since November 2022 his kidney functions have remarkably deteriorated because of the acute kidney injury from these episodes of nausea vomiting and diarrhea.  Neurologically he is fine and I do not have any further suggestions and unless his condition changes, no further follow-up is needed at this point..     I have discussed the above with the patient and family.  Time spent with patient: 70 minutes in face-to-face evaluation and management of the patient using the dedicated telemedicine device without any interruption with the patient located at the [facility] emergency room and myself at a remote location.    Coding    Dictated using Dragon dictation.

## 2023-03-17 ENCOUNTER — READMISSION MANAGEMENT (OUTPATIENT)
Dept: CALL CENTER | Facility: HOSPITAL | Age: 68
End: 2023-03-17
Payer: COMMERCIAL

## 2023-03-17 VITALS
OXYGEN SATURATION: 98 % | RESPIRATION RATE: 15 BRPM | WEIGHT: 149.69 LBS | BODY MASS INDEX: 24.94 KG/M2 | HEIGHT: 65 IN | TEMPERATURE: 98.2 F | DIASTOLIC BLOOD PRESSURE: 78 MMHG | SYSTOLIC BLOOD PRESSURE: 170 MMHG | HEART RATE: 99 BPM

## 2023-03-17 LAB
ANION GAP SERPL CALCULATED.3IONS-SCNC: 7.5 MMOL/L (ref 5–15)
BUN SERPL-MCNC: 24 MG/DL (ref 8–23)
BUN/CREAT SERPL: 19.5 (ref 7–25)
CALCIUM SPEC-SCNC: 7.9 MG/DL (ref 8.6–10.5)
CHLORIDE SERPL-SCNC: 108 MMOL/L (ref 98–107)
CO2 SERPL-SCNC: 23.5 MMOL/L (ref 22–29)
CREAT SERPL-MCNC: 1.23 MG/DL (ref 0.76–1.27)
DEPRECATED RDW RBC AUTO: 42.8 FL (ref 37–54)
EGFRCR SERPLBLD CKD-EPI 2021: 64.7 ML/MIN/1.73
ERYTHROCYTE [DISTWIDTH] IN BLOOD BY AUTOMATED COUNT: 13.9 % (ref 12.3–15.4)
GLUCOSE BLDC GLUCOMTR-MCNC: 139 MG/DL (ref 70–130)
GLUCOSE BLDC GLUCOMTR-MCNC: 149 MG/DL (ref 70–130)
GLUCOSE SERPL-MCNC: 160 MG/DL (ref 65–99)
HCT VFR BLD AUTO: 34.3 % (ref 37.5–51)
HGB BLD-MCNC: 11.7 G/DL (ref 13–17.7)
MCH RBC QN AUTO: 29.5 PG (ref 26.6–33)
MCHC RBC AUTO-ENTMCNC: 34.1 G/DL (ref 31.5–35.7)
MCV RBC AUTO: 86.6 FL (ref 79–97)
PLATELET # BLD AUTO: 240 10*3/MM3 (ref 140–450)
PMV BLD AUTO: 9.1 FL (ref 6–12)
POTASSIUM SERPL-SCNC: 4.1 MMOL/L (ref 3.5–5.2)
RBC # BLD AUTO: 3.96 10*6/MM3 (ref 4.14–5.8)
SODIUM SERPL-SCNC: 139 MMOL/L (ref 136–145)
WBC NRBC COR # BLD: 6.25 10*3/MM3 (ref 3.4–10.8)

## 2023-03-17 PROCEDURE — G0378 HOSPITAL OBSERVATION PER HR: HCPCS

## 2023-03-17 PROCEDURE — 82962 GLUCOSE BLOOD TEST: CPT

## 2023-03-17 PROCEDURE — 99238 HOSP IP/OBS DSCHRG MGMT 30/<: CPT | Performed by: FAMILY MEDICINE

## 2023-03-17 PROCEDURE — 80048 BASIC METABOLIC PNL TOTAL CA: CPT | Performed by: FAMILY MEDICINE

## 2023-03-17 PROCEDURE — 85027 COMPLETE CBC AUTOMATED: CPT | Performed by: FAMILY MEDICINE

## 2023-03-17 PROCEDURE — 25010000002 HEPARIN (PORCINE) PER 1000 UNITS: Performed by: FAMILY MEDICINE

## 2023-03-17 PROCEDURE — 97165 OT EVAL LOW COMPLEX 30 MIN: CPT

## 2023-03-17 PROCEDURE — 97161 PT EVAL LOW COMPLEX 20 MIN: CPT

## 2023-03-17 PROCEDURE — 96361 HYDRATE IV INFUSION ADD-ON: CPT

## 2023-03-17 RX ORDER — LEVOCETIRIZINE DIHYDROCHLORIDE 5 MG/1
2.5 TABLET, FILM COATED ORAL EVERY EVENING
Qty: 1 TABLET | Refills: 0 | Status: SHIPPED | OUTPATIENT
Start: 2023-03-17

## 2023-03-17 RX ORDER — LEVOCETIRIZINE DIHYDROCHLORIDE 5 MG/1
5 TABLET, FILM COATED ORAL EVERY EVENING
Status: ON HOLD | COMMUNITY
End: 2023-03-17 | Stop reason: SDUPTHER

## 2023-03-17 RX ADMIN — ESCITALOPRAM OXALATE 5 MG: 10 TABLET ORAL at 09:10

## 2023-03-17 RX ADMIN — ASPIRIN 81 MG CHEWABLE TABLET 81 MG: 81 TABLET CHEWABLE at 09:10

## 2023-03-17 RX ADMIN — SODIUM CHLORIDE 150 ML/HR: 9 INJECTION, SOLUTION INTRAVENOUS at 02:16

## 2023-03-17 RX ADMIN — Medication 3 ML: at 09:11

## 2023-03-17 RX ADMIN — ATORVASTATIN CALCIUM 20 MG: 20 TABLET, FILM COATED ORAL at 09:10

## 2023-03-17 NOTE — PLAN OF CARE
Goal Outcome Evaluation:  Plan of Care Reviewed With: patient, spouse        Progress: no change  Outcome Evaluation: Patient participates well in PT evaluation and demonstrates safe, independent functional mobility skills.  He has visual deficits due to diabetic retinopathy; therefore, he requires supervision and verbal cues for mobility.  He does not require the skills of PT at this time.

## 2023-03-17 NOTE — H&P
[facility]   HISTORY AND PHYSICAL      Name:  Pascual Kaur   Age:  67 y.o.  Sex:  male  :  1955  MRN:  [id number]   Visit Number:  [id number]  Admission Date:  3/16/2023  Date Of Service:  23  Primary Care Physician:  Humberto Kumari MD    Chief Complaint:     Dizziness, presyncope    History Of Presenting Illness:      Patient is 67 years old male with a past medical history of COPD, insulin-dependent diabetes mellitus, lung cancer and kidney stones who presented to the ER with a chief complaint of presyncope today prior to arrival.  Patient reports that he was at work when he started not feeling well suddenly with symptoms of frontal headache, dizziness and overall feeling fatigue and generally weak.  Patient felt nauseated.  His coworker told him that he was looking pale and he was about to pass out but he did not actually pass out or lose consciousness. Patient reports that he never experienced similar symptoms before. Patient reports that his been having watery diarrhea over the past few days.  No recent abdominal pain, chest pain, shortness of breath, fever or chills.    On ER arrival, his vitals were stable and was afebrile. His work-up included high-sensitivity troponin of 38-33, sodium 135, potassium 3.4, creatinine 2.03 (baseline creatinine of 0.8) BUN 26 otherwise within acceptable range on his CBC and CMP.  Urinalysis with 3+ protein, trace bacteria otherwise negative.  Flu and COVID-negative.  CT head without contrast No acute intracranial hemorrhage or large acute cortical infarct.  Chest x-ray with chronic changes with no acute cardiopulmonary process.  MRI brain with and without contrast with no acute intracranial abnormality.  Dr. Skinner with neurology have evaluated the patient and reviewed imaging and recommended admission for ROZINA, no further recommendations from neurology at this time.  Patient received 1 L bolus of normal saline while in  the ER.  Hospitalist consulted for admission, further evaluation and treatment.    Review Of Systems:    All systems were reviewed and negative except as mentioned in history of presenting illness, assessment and plan.    Past Medical History: Patient  has a past medical history of Bronchitis, COPD (chronic obstructive pulmonary disease) (HCC), Diabetes mellitus type I (HCC), Elevated liver enzymes, Fatigue, History of chemotherapy, History of CT scan of chest (06/19/2014), History of MRI, Lung cancer (HCC) (05/2011), Sinusitis, Stress, and Type 2 diabetes mellitus with hyperglycemia, with long-term current use of insulin (HCC) (2/4/2021).    Past Surgical History: Patient  has a past surgical history that includes Lung cancer surgery; Kidney stone surgery; Cataract extraction (03/08/2021); and Cataract extraction (04/06/2021).    Social History: Patient  reports that he quit smoking about 13 years ago. He has a 111.00 pack-year smoking history. He has never used smokeless tobacco. He reports that he does not drink alcohol and does not use drugs.    Family History:  Patient's family history has been reviewed and found to be noncontributory.    Allergies:      Iodine and Other    Home Medications:    Prior to Admission Medications     Prescriptions Last Dose Informant Patient Reported? Taking?    albuterol sulfate  (90 Base) MCG/ACT inhaler   No No    Inhale 2 puffs by mouth Every 4 (Four) Hours As Needed for Wheezing.    amoxicillin-clavulanate (Augmentin) 875-125 MG per tablet   No No    Take 1 tablet by mouth Every 12 (Twelve) Hours.    aspirin 81 MG chewable tablet   Yes No    Chew 81 mg Daily.    atorvastatin (LIPITOR) 20 MG tablet   No No    Take 1 tablet by mouth Daily.    Dulaglutide (Trulicity) 0.75 MG/0.5ML solution pen-injector   No No    Inject 0.75 mg under the skin into the appropriate area as directed 1 (One) Time Per Week.    Patient taking differently:  Inject 0.75 mg under the skin into the  appropriate area as directed 1 (One) Time Per Week. ON SUNDAYS    escitalopram (Lexapro) 5 MG tablet   No No    Take 1 tablet by mouth Daily.    fluticasone (Flonase) 50 MCG/ACT nasal spray   No No    Instill 2 sprays into each nostril daily.    folic acid (FOLVITE) 1 MG tablet   No No    Take 1 tablet by mouth Daily.    folic acid (FOLVITE) 1 MG tablet   No No    Take 1 tablet by mouth Daily.    glucose blood (Prodigy No Coding Blood Gluc) test strip   No No    Test glucose three times daily for diabetes    Patient taking differently:  Test glucose three times daily for diabetes    glucose blood test strip   No No    Use as directed 3-4 times a day    Patient taking differently:  Use as directed 3-4 times a day    Homeopathic Products (ALLERGY MEDICINE PO)   Yes No    Take 1 tablet by mouth daily.    Humira Pen 40 MG/0.4ML Pen-injector Kit 3/13/2023  Yes No    40 mg. TAKE EVERY OTHER Monday.    insulin aspart (NovoLOG FlexPen) 100 UNIT/ML solution pen-injector sc pen   No No    Inject as directed by correctional scale, MDD 20 units    Patient taking differently:  60 Units 1 (One) Time. Inject as directed by correctional scale, MDD 60 units [facility] total    insulin detemir (Levemir FlexTouch) 100 UNIT/ML injection 3/16/2023  No Yes    Inject 30 Units under the skin into the appropriate area as directed 2 (Two) Times a Day.    Patient taking differently:  Inject 30 Units under the skin into the appropriate area as directed 2 (Two) Times a Day. AM and Dinner    methotrexate 2.5 MG tablet   No No    Take 8 tablets by mouth 1 (One) Time Per Week.    methotrexate 2.5 MG tablet   No No    Take 8 tablets by mouth 1 (One) Time Per Week.    omeprazole (priLOSEC) 40 MG capsule   No No    Take 1 capsule by mouth Daily.    predniSONE (DELTASONE) 10 MG tablet   No No    Take 1 Tablet by mouth Daily for 2 - 5 days as needed for a flare up, may repeat once a week    predniSONE (DELTASONE) 10 MG tablet   No No    Take 1 Tablet by  "mouth Daily for 2 - 5 days as needed for a flare up, may repeat once a week    promethazine-dextromethorphan (PROMETHAZINE-DM) 6.25-15 MG/5ML syrup   No No    Take 5 mL by mouth 4 (Four) Times a Day As Needed for Cough.        ED Medications:    Medications   sodium chloride 0.9 % flush 10 mL (has no administration in time range)   sodium chloride 0.9 % infusion (150 mL/hr Intravenous New Bag 3/16/23 1822)   ondansetron (ZOFRAN) injection 4 mg (4 mg Intravenous Given 3/16/23 1359)   sodium chloride 0.9 % bolus 1,000 mL (0 mL Intravenous Stopped 3/16/23 1657)   gadobenate dimeglumine (MULTIHANCE) injection 15 mL (15 mL Intravenous Given 3/16/23 1612)     Vital Signs:  Temp:  [97.7 °F (36.5 °C)-98.4 °F (36.9 °C)] 97.7 °F (36.5 °C)  Heart Rate:  [78-95] 95  Resp:  [16-18] 16  BP: (120-166)/() 128/77        03/16/23  1326 03/16/23  1928   Weight: 74.8 kg (165 lb) 67.9 kg (149 lb 11.1 oz)     Body mass index is 24.91 kg/m².    Physical Exam:     Most recent vital Signs: /77 (BP Location: Right arm, Patient Position: Lying)   Pulse 95   Temp 97.7 °F (36.5 °C) (Oral)   Resp 16   Ht 165.1 cm (65\")   Wt 67.9 kg (149 lb 11.1 oz)   SpO2 95%   BMI 24.91 kg/m²     Physical Exam  Vitals and nursing note reviewed.   Constitutional:       General: He is not in acute distress.  HENT:      Head: Normocephalic and atraumatic.      Right Ear: External ear normal.      Left Ear: External ear normal.      Nose: Nose normal.      Mouth/Throat:      Mouth: Mucous membranes are dry.   Eyes:      Extraocular Movements: Extraocular movements intact.      Conjunctiva/sclera: Conjunctivae normal.      Pupils: Pupils are equal, round, and reactive to light.   Cardiovascular:      Rate and Rhythm: Normal rate and regular rhythm.      Pulses: Normal pulses.      Heart sounds: Normal heart sounds.   Pulmonary:      Effort: Pulmonary effort is normal.      Breath sounds: Normal breath sounds. No wheezing or rhonchi.   Abdominal: "      General: Bowel sounds are normal. There is no distension.      Palpations: Abdomen is soft.      Tenderness: There is no abdominal tenderness. There is no guarding or rebound.   Musculoskeletal:         General: Normal range of motion.      Cervical back: Normal range of motion and neck supple.      Right lower leg: No edema.      Left lower leg: No edema.   Skin:     General: Skin is warm and dry.      Findings: No rash.   Neurological:      General: No focal deficit present.      Mental Status: He is alert and oriented to person, place, and time. Mental status is at baseline.      Cranial Nerves: No cranial nerve deficit.      Motor: No weakness.   Psychiatric:         Mood and Affect: Mood normal.         Behavior: Behavior normal.         Thought Content: Thought content normal.         Laboratory data:    I have reviewed the labs done in the emergency room.    Results from last 7 days   Lab Units 03/16/23  1332   SODIUM mmol/L 135*   POTASSIUM mmol/L 3.4*   CHLORIDE mmol/L 96*   CO2 mmol/L 27.2   BUN mg/dL 26*   CREATININE mg/dL 2.03*   CALCIUM mg/dL 9.1   BILIRUBIN mg/dL 0.4   ALK PHOS U/L 138*   ALT (SGPT) U/L 21   AST (SGOT) U/L 25   GLUCOSE mg/dL 90     Results from last 7 days   Lab Units 03/16/23  1332   WBC 10*3/mm3 8.16   HEMOGLOBIN g/dL 13.9   HEMATOCRIT % 41.5   PLATELETS 10*3/mm3 282         Results from last 7 days   Lab Units 03/16/23  1656 03/16/23  1332   HSTROP T ng/L 33* 38*                     Results from last 7 days   Lab Units 03/16/23  1643   COLOR UA  Yellow   GLUCOSE UA  Negative   KETONES UA  Negative   LEUKOCYTES UA  Negative   PH, URINE  5.5   BILIRUBIN UA  Negative   UROBILINOGEN UA  0.2 E.U./dL   RBC UA /HPF None Seen   WBC UA /HPF None Seen       Pain Management Panel     Pain Management Panel Latest Ref Rng & Units 5/25/2022 4/19/2021    CREATININE UR mg/dL 100.2 76.1          EKG:    Sinus rhythm, heart rate 80, right ventricular conduction delay, nonspecific ST/T wave  changes.    Radiology:    CT Head Without Contrast    Result Date: 3/16/2023  HEAD CT     3/16/2023 1:54 PM  HISTORY: dizziness, headache  TECHNIQUE: Multiple axial CT images were performed from the foramen magnum to the vertex. Coronal reformatted images were reconstructed from axial data set. This study was performed with techniques to keep radiation doses as low as reasonably achievable (ALARA). Individualized dose reduction techniques using automated exposure control or adjustment of mA and/or kV according to the patient size were employed. 821.28 mGy.cm  COMPARISON: None  FINDINGS: No acute intracranial hemorrhage or large acute cortical infarct. Mild chronic small vessel ischemic white matter changes are present. No significant cerebral volume loss. Ventricles are normal in size and configuration. No midline shift. The basal cisterns are patent. Intracranial arterial atherosclerotic calcifications.  No skull fracture. The visualized paranasal sinuses and mastoid air cells are clear.      No acute intracranial hemorrhage or large acute cortical infarct.   CRITICAL RESULT: No.  COMMUNICATION: Per this written report.  Images personally reviewed, interpreted, and dictated by HANNA Gore.          This report was signed and finalized on 3/16/2023 2:16 PM by HANNA Gore.    MRI Brain With & Without Contrast    Result Date: 3/16/2023  PROCEDURE: MRI BRAIN W WO CONTRAST-  HISTORY: dizziness/headache  COMPARISON:  None.  FINDINGS: Multiplanar MR imaging of the head was performed  without and with contrast.  Additional thin section pre and postcontrast images of the cerebellopontine angles and internal auditory canals were obtained.  There is no evidence of intracranial hemorrhage or mass.  The ventricles are within normal limits with respect to size.  There is  no evidence of shift of the midline structures.   No abnormal extra-axial fluid collection is seen. The posterior fossa and brainstem have  an unremarkable appearance.  No abnormal contrast enhancement is identified. On the diffusion weighted images,  no abnormal restricted diffusion is seen.  Normal major vessel vascular flow voids are identified.  There is no evidence of cerebellopontine angle mass or abnormal contrast enhancement. The 7th and 8th nerve complexes appear normal. No mass or abnormal contrast enhancement is seen in the internal auditory canals.      No acute intracranial abnormality.    This report was signed and finalized on 3/16/2023 4:23 PM by Blas Barrios MD.    XR Chest 1 View    Result Date: 3/16/2023  CLINICAL INDICATION:  Weak/Dizzy/AMS triage protocol  EXAMINATION TECHNIQUE: XR CHEST 1 VW-  COMPARISON: Radiographs 05/03/2022.  FINDINGS: Multiple surgical clips in the right mid chest with the pulmonary architectural distortion in the right hilar region, likely postoperative sequela. Cardiac and mediastinal contours are within normal limits except aortic atherosclerosis. Emphysema. No dense consolidation. No pneumothorax or pleural effusion. Postsurgical changes in the right mid thoracic cage.      No acute cardiopulmonary findings. Chronic changes as above.  Images personally reviewed, interpreted and dictated by HANNA Gore.       This report was signed and finalized on 3/16/2023 2:18 PM by HANNA Gore.      Assessment:    1. Acute kidney injury, POA  2. Presyncope, POA  3. Diarrhea, POA  4. Elevated troponin, likely demand ischemia, POA  5. COPD  6. Insulin-dependent diabetes mellitus  7. Lung cancer  8. History of kidney stones  9. Rheumatoid arthritis    Plan:    Patient is admitted to telemetry for further evaluation treatment.    ROZINA  -Likely prerenal and secondary to dehydration with reported diarrhea.  -Avoid nephrotoxic drugs.  -Continue IV fluids hydration with normal saline at 150 mL/hr  -Monitor kidney function  -With his reported history of kidney stones, Will order CT abdomen and pelvis for  further evaluation and rule out obstruction.  -Will consider consulting with nephrology if no improvement on IV fluids.    Presyncope  -Likely due to dehydration and acute kidney injury  -Neurology consulted, MRI negative.  -Continue telemetry    Diabetes mellitus insulin-dependent  -Sliding scale insulin  -Levemir 30 units nightly for now  -Hemoglobin A1c    Elevated troponin  -Likely in the settings of acute kidney injury.  - Patient denies any chest pain or shortness of breath or palpitations, low suspicion for ACS.  - Troponin plateaued, delta change -5    Further orders as indicated per clinical course.      Risk Assessment: Moderate to high  DVT Prophylaxis: Heparin subcu prophylaxis (benefit> risk)  Code Status: Full  Diet: Renal    Advance Care Planning   ACP discussion was held with the patient during this visit. Patient does not have an advance directive, information provided.       Kat Larson MD  03/16/23  20:20 EDT    Dictated utilizing Dragon dictation.

## 2023-03-17 NOTE — CASE MANAGEMENT/SOCIAL WORK
Discharge Planning Assessment   [facility]     Patient Name: Pascual Kaur  MRN: [id number]  Today's Date: 3/17/2023    Admit Date: 3/16/2023    Plan: pt resting in bed this am; wife at bedside; plans home on d/c; spouse will transport; stated we have a copy of lw; zero financial concerns; stated he does have decreased vision, so wife drives to work; b/p cuff and glucose monitoring device at home; no cm needs noted   Discharge Needs Assessment     Row Name 03/17/23 1254       Living Environment    People in Home spouse    Current Living Arrangements home    Primary Care Provided by self    Provides Primary Care For no one    Family Caregiver if Needed spouse    Quality of Family Relationships supportive    Able to Return to Prior Arrangements yes    Living Arrangement Comments spouse will transport home       Resource/Environmental Concerns    Resource/Environmental Concerns none    Transportation Concerns none       Food Insecurity    Within the past 12 months, you worried that your food would run out before you got the money to buy more. Never true    Within the past 12 months, the food you bought just didn't last and you didn't have money to get more. Never true       Transition Planning    Patient/Family Anticipates Transition to home with family    Patient/Family Anticipated Services at Transition none    Transportation Anticipated family or friend will provide       Discharge Needs Assessment    Readmission Within the Last 30 Days no previous admission in last 30 days    Equipment Currently Used at Home bp cuff;glucometer    Concerns to be Addressed no discharge needs identified    Anticipated Changes Related to Illness none    Equipment Needed After Discharge none    Provided Post Acute Provider List? N/A    Provided Post Acute Provider Quality & Resource List? N/A    Current Discharge Risk other (see comments)  vision issues               Discharge Plan     Row Name 03/17/23 5710       Plan     Plan pt resting in bed this am; wife at bedside; plans home on d/c; spouse will transport; stated we have a copy of lw; zero financial concerns; stated he does have decreased vision, so wife drives to work; b/p cuff and glucose monitoring device at home; no cm needs noted              Continued Care and Services - Discharged on 3/17/2023 Admission date: 3/16/2023 - Discharge disposition: Home or Self Care   Coordination has not been started for this encounter.       Expected Discharge Date and Time     Expected Discharge Date Expected Discharge Time    Mar 17, 2023          Demographic Summary     Row Name 03/17/23 1253       General Information    Admission Type observation    Arrived From emergency department    Referral Source admission list    Reason for Consult discharge planning    Preferred Language English       Contact Information    Permission Granted to Share Info With family/designee  spouse/in room               Functional Status     Row Name 03/17/23 1254       Functional Status    Usual Activity Tolerance moderate    Current Activity Tolerance moderate       Functional Status, IADL    Medications independent    Meal Preparation independent    Housekeeping independent    Laundry independent    Shopping independent    IADL Comments but wife drives him to work due to decreased vision       Mental Status Summary    Recent Changes in Mental Status/Cognitive Functioning no changes       Employment/    Employment Status employed full-time                    Jigna Jackson, CARMELA

## 2023-03-17 NOTE — PLAN OF CARE
Goal Outcome Evaluation:  Plan of Care Reviewed With: patient, spouse        Progress: improving  Outcome Evaluation: OT eval completed. Patient is mod ind with bed mobility, able to jenaro pants, socks and shoes with S/U, patient performed tf and funcitonal mobility with Sup without use of AD, VCs for obstacle negotiation d/t visual deficits. Patient reports he still works, does not drive. Patient to return home with wife, no OT needs at this time.

## 2023-03-17 NOTE — OUTREACH NOTE
Prep Survey    Flowsheet Row Responses   [facility] facility patient discharged from? [facility]   Is LACE score < 7 ? No   Eligibility [facility]   Date of Admission 03/16/23   Date of Discharge 03/17/23   Discharge Disposition Home or Self Care   Discharge diagnosis Dizziness   Does the patient have one of the following disease processes/diagnoses(primary or secondary)? Other   Does the patient have Home health ordered? No   Is there a DME ordered? No   Prep survey completed? Yes          BENJAMÍN GARRETT - Registered Nurse

## 2023-03-17 NOTE — THERAPY DISCHARGE NOTE
Acute Care - Occupational Therapy Discharge  [facility]    Patient Name: Pascual Kaur  : 1955    MRN: [id number]                              Today's Date: 3/17/2023       Admit Date: 3/16/2023    Visit Dx:     ICD-10-CM ICD-9-CM   1. Dizziness  R42 780.4   2. ROZINA (acute kidney injury) (HCC)  N17.9 584.9     Patient Active Problem List   Diagnosis   • Chronic obstructive pulmonary disease (HCC)   • Malignant neoplasm of upper lobe of right lung (HCC)   • Type 2 diabetes mellitus with hyperglycemia, with long-term current use of insulin (HCC)   • Rheumatoid arthritis involving multiple sites with positive rheumatoid factor (HCC)   • Reactive depression   • Dizziness     Past Medical History:   Diagnosis Date   • Bronchitis    • COPD (chronic obstructive pulmonary disease) (HCC)    • Diabetes mellitus type I (HCC)    • Elevated liver enzymes     Chronic elevated liver enzymes with history of elevated alkaline phosphate as well as ALT and AST.   • Fatigue     Generalized fatigue, nondescript, not new, not better with exercise, not worse with exercise, not better with rest   • History of chemotherapy     Received 1 course of adjuvant carboplatin and Taxotere. Then we started carbo.Taxol off a 3 on, 1 off weekly regimen but he did not tolerate either of those and subsequentlly refused further adjuvant therapy. On follow-up scanning since that time.   • History of CT scan of chest 2014    CT of chest noncontrast showed no evidence of recurrence. There was stable diffuse scarring in the right middle and upper lung consistent with postoperative changes and stable obstructive emphysematous changes.   • History of MRI     MRI of brain negative   • Impaired functional mobility, balance, gait, and endurance    • Lung cancer ([facility]) 2011    Stage IIA, T2N1, non-small cell lung cancer, status post wedge resection of a 3 cm primary, level 10 node positive, preop PET negative, and MRI of brain negative  lung cancer. Ineligible for our MORAB trial due to the wedge resection.  Diagnosis was in May 2011. - No recurrence.   • Sinusitis    • Stress     regarding his occupation   • Type 2 diabetes mellitus with hyperglycemia, with long-term current use of insulin ([facility]) 02/04/2021     Past Surgical History:   Procedure Laterality Date   • CATARACT EXTRACTION  03/08/2021   • CATARACT EXTRACTION  04/06/2021   • KIDNEY STONE SURGERY     • LUNG CANCER SURGERY        General Information     Row Name 03/17/23 1128          OT Time and Intention    Document Type discharge evaluation/summary  -SD     Mode of Treatment occupational therapy  -SD     Row Name 03/17/23 1128          General Information    Patient Profile Reviewed yes  -SD     Prior Level of Function independent:;all household mobility;transfer;community mobility;ADL's;work  wife drives patient d/t vision deficits secondary to diabetes  -SD     Existing Precautions/Restrictions no known precautions/restrictions  -SD     Barriers to Rehab visual deficit  -SD     Row Name 03/17/23 1128          Living Environment    People in Home spouse  -SD     Row Name 03/17/23 1128          Home Main Entrance    Number of Stairs, Main Entrance none  -SD     Row Name 03/17/23 1128          Stairs Within Home, Primary    Number of Stairs, Within Home, Primary none  -SD     Row Name 03/17/23 1128          Cognition    Orientation Status (Cognition) oriented x 4  -SD     Row Name 03/17/23 1128          Safety Issues, Functional Mobility    Safety Issues Affecting Function (Mobility) safety precautions follow-through/compliance;insight into deficits/self-awareness  -SD     Impairments Affecting Function (Mobility) visual/perceptual  -SD           User Key  (r) = Recorded By, (t) = Taken By, (c) = Cosigned By    Initials Name Provider Type    SD Makenna Little OT Occupational Therapist               Mobility/ADL's     Row Name 03/17/23 1129          Bed Mobility    Bed Mobility  supine-sit  -SD     Supine-Sit [place] (Bed Mobility) modified independence  -SD     Assistive Device (Bed Mobility) head of bed elevated  -SD     Row Name 03/17/23 1129          Transfers    Transfers sit-stand transfer  -SD     Row Name 03/17/23 1129          Sit-Stand Transfer    Sit-Stand [place] (Transfers) modified independence  -SD     [facility] Name 03/17/23 1129          Functional Mobility    Functional Mobility- Ind. Level supervision required  -SD     Functional Mobility-Distance (Feet) 300  -SD     Functional Mobility- Comment cues for obstacle negotiation  -SD     Row Name 03/17/23 1129          Activities of Daily Living    BADL Assessment/Intervention bathing;upper body dressing;lower body dressing;grooming;feeding;toileting  -SD     [facility] Name 03/17/23 1129          Bathing Assessment/Intervention    [place] Level (Bathing) supervision  -SD     [facility] Name 03/17/23 1129          Upper Body Dressing Assessment/Training    [place] Level (Upper Body Dressing) set up  -SD     [facility] Name 03/17/23 1129          Lower Body Dressing Assessment/Training    [place] Level (Lower Body Dressing) set up  -SD     [facility] Name 03/17/23 1129          Grooming Assessment/Training    [place] Level (Grooming) set up  -SD     [facility] Name 03/17/23 1129          Self-Feeding Assessment/Training    [place] Level (Feeding) independent  -SD     [facility] Name 03/17/23 1129          Toileting Assessment/Training    [place] Level (Toileting) supervision  -SD           User Key  (r) = Recorded By, (t) = Taken By, (c) = Cosigned By    Initials Name Provider Type    SD Makenna Little OT Occupational Therapist               Obj/Interventions     Row Name 03/17/23 1131          Vision Assessment/Intervention    Visual Impairment/Limitations distance vision impaired;near/reading vision impaired  -SD     [facility] Name 03/17/23 1131          Range of Motion Comprehensive    General Range of Motion bilateral upper extremity  ROM WFL  -SD     Row Name 03/17/23 1131          Strength Comprehensive (MMT)    Comment, General Manual Muscle Testing (MMT) Assessment UB 4+/5  -SD           User Key  (r) = Recorded By, (t) = Taken By, (c) = Cosigned By    Initials Name Provider Type    Makenna Underwood OT Occupational Therapist               Goals/Plan    No documentation.                Clinical Impression     Row Name 03/17/23 1131          Pain Assessment    Pretreatment Pain Rating 2/10  -SD     Posttreatment Pain Rating 2/10  -SD     Pain Location - Side/Orientation Bilateral  -SD     Pain Location - hand  -SD     Pain Intervention(s) Repositioned;Ambulation/increased activity  -SD     Row Name 03/17/23 1131          Plan of Care Review    Plan of Care Reviewed With patient;spouse  -SD     Progress improving  -SD     Outcome Evaluation OT eval completed. Patient is mod ind with bed mobility, able to jenaro pants, socks and shoes with S/U, patient performed tf and funcitonal mobility with Sup without use of AD, VCs for obstacle negotiation d/t visual deficits. Patient reports he still works, does not drive. Patient to return home with wife, no OT needs at this time.  -SD     Row Name 03/17/23 1131          Therapy Assessment/Plan (OT)    Patient/Family Therapy Goal Statement (OT) home  -SD     Rehab Potential (OT) good, to achieve stated therapy goals  -SD     Criteria for Skilled Therapeutic Interventions Met (OT) no  -SD     Therapy Frequency (OT) evaluation only  -SD     Row Name 03/17/23 1131          Therapy Plan Review/Discharge Plan (OT)    Anticipated Discharge Disposition (OT) home with assist  -SD     Row Name 03/17/23 1131          Vital Signs    O2 Delivery Pre Treatment room air  -SD     O2 Delivery Intra Treatment room air  -SD     O2 Delivery Post Treatment room air  -SD     Row Name 03/17/23 1131          Positioning and Restraints    Pre-Treatment Position in bed  -SD     Post Treatment Position bed  -SD     In Bed sitting  EOB;call light within reach;encouraged to call for assist  -SD           User Key  (r) = Recorded By, (t) = Taken By, (c) = Cosigned By    Initials Name Provider Type    Makenna Underwood OT Occupational Therapist               Outcome Measures     Row Name 03/17/23 1134          How much help from another is currently needed...    Putting on and taking off regular lower body clothing? 4  -SD     Bathing (including washing, rinsing, and drying) 3  -SD     Toileting (which includes using toilet bed pan or urinal) 4  -SD     Putting on and taking off regular upper body clothing 4  -SD     Taking care of personal grooming (such as brushing teeth) 4  -SD     Eating meals 4  -SD     AM-PAC 6 Clicks Score (OT) 23  -SD     Row Name 03/17/23 0955 03/17/23 0800       How much help from another person do you currently need...    Turning from your back to your side while in flat bed without using bedrails? 4  -JR 4  -CM    Moving from lying on back to sitting on the side of a flat bed without bedrails? 4  -JR 4  -CM    Moving to and from a bed to a chair (including a wheelchair)? 4  -JR 4  -CM    Standing up from a chair using your arms (e.g., wheelchair, bedside chair)? 4  -JR 4  -CM    Climbing 3-5 steps with a railing? 4  -JR 4  -CM    To walk in hospital room? 4  -JR 4  -CM    AM-PAC 6 Clicks Score (PT) 24  -JR 24  -CM    Highest level of mobility 8 --> Walked 250 feet or more  -JR 8 --> Walked 250 feet or more  -CM    Row Name 03/17/23 1134 03/17/23 0955       Functional Assessment    Outcome Measure Options AM-PAC 6 Clicks Daily Activity (OT)  -SD AM-PAC 6 Clicks Basic Mobility (PT)  -JR          User Key  (r) = Recorded By, (t) = Taken By, (c) = Cosigned By    Initials Name Provider Type    Shira Loo, PT Physical Therapist    Adriana Polanco RN Registered Nurse    Makenna Underwood OT Occupational Therapist                OT Recommendation and Plan  Therapy Frequency (OT): evaluation only  Plan of  Care Review  Plan of Care Reviewed With: patient, spouse  Progress: improving  Outcome Evaluation: OT eval completed. Patient is mod ind with bed mobility, able to jenaro pants, socks and shoes with S/U, patient performed tf and funcitonal mobility with Sup without use of AD, VCs for obstacle negotiation d/t visual deficits. Patient reports he still works, does not drive. Patient to return home with wife, no OT needs at this time.  Plan of Care Reviewed With: patient, spouse  Outcome Evaluation: OT eval completed. Patient is mod ind with bed mobility, able to jenaro pants, socks and shoes with S/U, patient performed tf and funcitonal mobility with Sup without use of AD, VCs for obstacle negotiation d/t visual deficits. Patient reports he still works, does not drive. Patient to return home with wife, no OT needs at this time.     Time Calculation:    Time Calculation- OT     Row Name 03/17/23 1134             Time Calculation- OT    OT Start Time 1001  -SD      OT Received On 03/17/23  -SD         Untimed Charges    OT Eval/Re-eval Minutes 30  -SD         Total Minutes    Untimed Charges Total Minutes 30  -SD       Total Minutes 30  -SD            User Key  (r) = Recorded By, (t) = Taken By, (c) = Cosigned By    Initials Name Provider Type    Makenna Underwood OT Occupational Therapist              Therapy Charges for Today     Code Description Service Date Service Provider Modifiers Qty    [id number]  OT EVAL LOW COMPLEXITY 2 3/17/2023 Makenna Little OT GO 1             OT Discharge Summary  Anticipated Discharge Disposition (OT): home with assist    Makenna Little OT  3/17/2023

## 2023-03-17 NOTE — THERAPY DISCHARGE NOTE
Patient Name: Pascual Kaur  : 1955    MRN: [id number]                              Today's Date: 3/17/2023       Admit Date: 3/16/2023    Visit Dx:     ICD-10-CM ICD-9-CM   1. Dizziness  R42 780.4   2. ROZINA (acute kidney injury) (HCC)  N17.9 584.9     Patient Active Problem List   Diagnosis   • Chronic obstructive pulmonary disease (HCC)   • Malignant neoplasm of upper lobe of right lung (HCC)   • Type 2 diabetes mellitus with hyperglycemia, with long-term current use of insulin (HCC)   • Rheumatoid arthritis involving multiple sites with positive rheumatoid factor (HCC)   • Reactive depression   • Dizziness     Past Medical History:   Diagnosis Date   • Bronchitis    • COPD (chronic obstructive pulmonary disease) (HCC)    • Diabetes mellitus type I (HCC)    • Elevated liver enzymes     Chronic elevated liver enzymes with history of elevated alkaline phosphate as well as ALT and AST.   • Fatigue     Generalized fatigue, nondescript, not new, not better with exercise, not worse with exercise, not better with rest   • History of chemotherapy     Received 1 course of adjuvant carboplatin and Taxotere. Then we started carbo.Taxol off a 3 on, 1 off weekly regimen but he did not tolerate either of those and subsequentlly refused further adjuvant therapy. On follow-up scanning since that time.   • History of CT scan of chest 2014    CT of chest noncontrast showed no evidence of recurrence. There was stable diffuse scarring in the right middle and upper lung consistent with postoperative changes and stable obstructive emphysematous changes.   • History of MRI     MRI of brain negative   • Impaired functional mobility, balance, gait, and endurance    • Lung cancer ([facility]) 2011    Stage IIA, T2N1, non-small cell lung cancer, status post wedge resection of a 3 cm primary, level 10 node positive, preop PET negative, and MRI of brain negative lung cancer. Ineligible for our MORAB trial due to the wedge  resection.  Diagnosis was in May 2011. - No recurrence.   • Sinusitis    • Stress     regarding his occupation   • Type 2 diabetes mellitus with hyperglycemia, with long-term current use of insulin (HCC) 02/04/2021     Past Surgical History:   Procedure Laterality Date   • CATARACT EXTRACTION  03/08/2021   • CATARACT EXTRACTION  04/06/2021   • KIDNEY STONE SURGERY     • LUNG CANCER SURGERY        General Information     Row Name 03/17/23 0955          Physical Therapy Time and Intention    Document Type evaluation  -JR     Mode of Treatment physical therapy  -JR     Row Name 03/17/23 0955          General Information    Patient Profile Reviewed yes  -JR     Prior Level of Function independent:;community mobility  -JR     Barriers to Rehab visual deficit  diabetic retinopathy  -JR     Row Name 03/17/23 0955          Living Environment    People in Home spouse  -JR     Row Name 03/17/23 0955          Home Main Entrance    Number of Stairs, Main Entrance none  -JR     Row Name 03/17/23 0955          Stairs Within Home, Primary    Number of Stairs, Within Home, Primary none  -JR     Row Name 03/17/23 0955          Cognition    Orientation Status (Cognition) oriented x 4  -JR     Row Name 03/17/23 0955          Safety Issues, Functional Mobility    Safety Issues Affecting Function (Mobility) insight into deficits/self-awareness  -JR     Impairments Affecting Function (Mobility) visual/perceptual  -JR           User Key  (r) = Recorded By, (t) = Taken By, (c) = Cosigned By    Initials Name Provider Type    JR Shira Mcarthur, PT Physical Therapist               Mobility     Row Name 03/17/23 0955          Bed Mobility    Bed Mobility bed mobility (all) activities  -JR     All Activities, [place] (Bed Mobility) modified independence  -     Assistive Device (Bed Mobility) head of bed elevated  -JR     Row Name 03/17/23 0955          Sit-Stand Transfer    Sit-Stand [place] (Transfers) modified independence  -      Assistive Device (Sit-Stand Transfers) other (see comments)  gait belt  -JR     Row Name 03/17/23 0955          Gait/Stairs (Locomotion)    [place] Level (Gait) supervision  -JR     Assistive Device (Gait) other (see comments)  gait belt  -JR     Distance in Feet (Gait) 300  -JR     Deviations/Abnormal Patterns (Gait) other (see comments)  required verbal cues for obstacles due to vision deficit  -JR           User Key  (r) = Recorded By, (t) = Taken By, (c) = Cosigned By    Initials Name Provider Type    Shira Loo PT Physical Therapist               Obj/Interventions     Row Name 03/17/23 0955          Range of Motion Comprehensive    General Range of Motion no range of motion deficits identified  -     Row Name 03/17/23 0955          Strength Comprehensive (MMT)    General Manual Muscle Testing (MMT) Assessment no strength deficits identified  -     Row Name 03/17/23 0955          Balance    Balance Assessment sitting static balance;sitting dynamic balance;sit to stand dynamic balance;standing static balance;standing dynamic balance  -JR     Static Sitting Balance independent  -JR     Dynamic Sitting Balance independent  -JR     Position, Sitting Balance unsupported;sitting edge of bed  -JR     Sit to Stand Dynamic Balance standby assist  -JR     Static Standing Balance supervision  -JR     Dynamic Standing Balance supervision  -JR     Position/Device Used, Standing Balance unsupported;other (see comments)  gait belt  -JR           User Key  (r) = Recorded By, (t) = Taken By, (c) = Cosigned By    Initials Name Provider Type    Shira Loo PT Physical Therapist               Goals/Plan    No documentation.                Clinical Impression     Row Name 03/17/23 0955          Pain    Pretreatment Pain Rating 2/10  -JR     Posttreatment Pain Rating 2/10  -JR     Pain Location - Side/Orientation Bilateral  -JR     Pain Location - hand  -JR     Pre/Posttreatment Pain Comment patient reports he has  chronic RA pain  -JR     Pain Intervention(s) Repositioned;Ambulation/increased activity  -JR     Row Name 03/17/23 0955          Plan of Care Review    Plan of Care Reviewed With patient;spouse  -JR     Progress no change  -JR     Outcome Evaluation Patient participates well in PT evaluation and demonstrates safe, independent functional mobility skills.  He has visual deficits due to diabetic retinopathy; therefore, he requires supervision and verbal cues for mobility.  He does not require the skills of PT at this time.  -JR     Row Name 03/17/23 0955          Therapy Assessment/Plan (PT)    Patient/Family Therapy Goals Statement (PT) Patient would like to go home today.  -JR     Criteria for Skilled Interventions Met (PT) no;no problems identified which require skilled intervention  -JR     Therapy Frequency (PT) evaluation only  -JR     Row Name 03/17/23 0955          Positioning and Restraints    Pre-Treatment Position in bed  -JR     Post Treatment Position bed  -JR     In Bed sitting EOB;call light within reach;encouraged to call for assist  -JR           User Key  (r) = Recorded By, (t) = Taken By, (c) = Cosigned By    Initials Name Provider Type    Shira Loo, PT Physical Therapist               Outcome Measures     Row Name 03/17/23 0955 03/17/23 0800       How much help from another person do you currently need...    Turning from your back to your side while in flat bed without using bedrails? 4  -JR 4  -CM    Moving from lying on back to sitting on the side of a flat bed without bedrails? 4  -JR 4  -CM    Moving to and from a bed to a chair (including a wheelchair)? 4  -JR 4  -CM    Standing up from a chair using your arms (e.g., wheelchair, bedside chair)? 4  -JR 4  -CM    Climbing 3-5 steps with a railing? 4  -JR 4  -CM    To walk in hospital room? 4  -JR 4  -CM    AM-PAC 6 Clicks Score (PT) 24  -JR 24  -CM    Highest level of mobility 8 --> Walked 250 feet or more  -JR 8 --> Walked 250 feet or  more  -CM    Row Name 03/17/23 0955          Functional Assessment    Outcome Measure Options AM-PAC 6 Clicks Basic Mobility (PT)  -JR           User Key  (r) = Recorded By, (t) = Taken By, (c) = Cosigned By    Initials Name Provider Type    Shira Loo, CLEMENTE Physical Therapist    Adriana Polanco, RN Registered Nurse              Physical Therapy Education     Title: PT OT SLP Therapies (Resolved)     Topic: Physical Therapy (Resolved)     Point: Mobility training (Resolved)     Learning Progress Summary           Patient Acceptance, E,TB, VU by  at 3/17/2023 1116    Comment: Importance of mobility                   Point: Home exercise program (Resolved)     Learner Progress:  Not documented in this visit.          Point: Body mechanics (Resolved)     Learner Progress:  Not documented in this visit.          Point: Precautions (Resolved)     Learner Progress:  Not documented in this visit.                      User Key     Initials Effective Dates Name Provider Type Amado     03/23/22 -  Shira Mcarthur, PT Physical Therapist PT              PT Recommendation and Plan     Plan of Care Reviewed With: patient, spouse  Progress: no change  Outcome Evaluation: Patient participates well in PT evaluation and demonstrates safe, independent functional mobility skills.  He has visual deficits due to diabetic retinopathy; therefore, he requires supervision and verbal cues for mobility.  He does not require the skills of PT at this time.     Time Calculation:    PT Charges     Row Name 03/17/23 1117             Time Calculation    Start Time 0955  -JR      PT Received On 03/17/23  -JR         Untimed Charges    PT Eval/Re-eval Minutes 35  -JR         Total Minutes    Untimed Charges Total Minutes 35  -JR       Total Minutes 35  -JR            User Key  (r) = Recorded By, (t) = Taken By, (c) = Cosigned By    Initials Name Provider Type    Shira Loo PT Physical Therapist              Therapy Charges for  Today     Code Description Service Date Service Provider Modifiers Qty    [id number] HC PT EVAL LOW COMPLEXITY 2 3/17/2023 Shira Mcarthur, PT GP 1          PT G-Codes  Outcome Measure Options: AM-PAC 6 Clicks Basic Mobility (PT)  AM-PAC 6 Clicks Score (PT): 24    PT Discharge Summary  Anticipated Discharge Disposition (PT): home    Shira Mcarthur, PT  3/17/2023

## 2023-03-18 NOTE — DISCHARGE SUMMARY
[facility]   DISCHARGE SUMMARY      Name:  Pascual Kaur   Age:  67 y.o.  Sex:  male  :  1955  MRN:  [id number]   Visit Number:  [id number]    Admission Date:  3/16/2023  Date of Discharge:  3/18/2023  Primary Care Physician:  Humberto Kumari MD    Important issues to note:    Continue hydration upon discharge  Monitor for any recurrence of severe diarrhea  Seek medical care with any further issues.  Repeat BMP in 1 week.    Discharge Diagnoses:     1. Acute kidney injury, POA  2. Presyncope, POA  3. Diarrhea, POA  4. Elevated troponin, likely demand ischemia, POA  5. COPD  6. Insulin-dependent diabetes mellitus  7. Lung cancer  8. History of kidney stones  9. Rheumatoid arthritis    Problem List:     Active Hospital Problems    Diagnosis  POA   • **Dizziness [R42]  Yes      Resolved Hospital Problems   No resolved problems to display.     Presenting Problem:    Chief Complaint   Patient presents with   • Dizziness      Consults:     Consulting Physician(s)     Provider Relationship Specialty    Abigail Skinner MD Consulting Physician Neurology        Procedures Performed:        History of presenting illness/Hospital Course:    The patient is a 67-year-old gent with history of COPD, insulin-dependent diabetes, lung cancer, kidney stones, who presented to the emergency room with multiple complaints.  He had admitted to some diarrhea, decreased urination, but then had developed headache with some dizziness while at work earlier.  He felt nauseated at the time.  He thought he was going to pass out but he did not pass out.  He had no chest pains or palpitations.  No shortness of breath.    Upon arrival to the emergency room he was hemodynamically stable on room air.  He had evidence of a mildly elevated troponin which was downtrending.  His labs are notable for creatinine of 2.03 with potassium 3.4 baseline creatinine around 1.  His CBC was unremarkable.   Urinalysis unremarkable.  Flu and COVID testing were negative.  CT scan of the head without contrast unremarkable.  Chest x-ray was unremarkable.  MRI of the brain was unremarkable.  Teleneurology was consulted emergency room, recommended treatment of the ROZINA and hydration.  Patient was admitted for observation.    This morning, patient is significantly improved.  He is ambulating around the room without any issues.  He had no recurrence of symptoms noted overnight.  He denied any chest pains palpitations.  He has had multiple voids.  I discussed avoidance of any NSAIDs for the next few days.  I encouraged him to have a BMP performed next week and following up with his primary care provider in the next 7 days.  Discussed if diarrhea is persistent can take antidiarrheal medicine.  He did note that his wife and other for members had also been sick with a gastroenteritis type illness about 2 days prior to his symptoms.  Plan of care was discussed with him and his significant other at bedside.  All questions answered.  Work excuse given.    Vital Signs:    Temp:  [98.2 °F (36.8 °C)] 98.2 °F (36.8 °C)  Heart Rate:  [99] 99  Resp:  [15] 15  BP: (170)/(78) 170/78    Physical Exam:    General Appearance:  Alert and cooperative.  NAD   Head:  Atraumatic and normocephalic.   Eyes: Conjunctivae and sclerae normal, no icterus. No pallor.   Ears:  Ears with no abnormalities noted.   Throat: No oral lesions, no thrush, oral mucosa moist.   Neck: Supple, trachea midline, no thyromegaly.   Back:   No kyphoscoliosis present. No tenderness to palpation.   Lungs:   Breath sounds heard bilaterally equally.  No crackles or wheezing. No Pleural rub or bronchial breathing.   Heart:  Normal S1 and S2, no murmur, no gallop, no rub. No JVD.   Abdomen:   Normal bowel sounds, no masses, no organomegaly. Soft, nontender, nondistended, no rebound tenderness.   Extremities: Supple, no edema, no cyanosis, no clubbing.   Pulses: Pulses palpable  bilaterally.   Skin: No bleeding or rash.   Neurologic: Alert and oriented x 3. No facial asymmetry. Moves all four limbs. No tremors.     Pertinent Lab Results:     Results from last 7 days   Lab Units 03/17/23  0613 03/16/23  1332   SODIUM mmol/L 139 135*   POTASSIUM mmol/L 4.1 3.4*   CHLORIDE mmol/L 108* 96*   CO2 mmol/L 23.5 27.2   BUN mg/dL 24* 26*   CREATININE mg/dL 1.23 2.03*   CALCIUM mg/dL 7.9* 9.1   BILIRUBIN mg/dL  --  0.4   ALK PHOS U/L  --  138*   ALT (SGPT) U/L  --  21   AST (SGOT) U/L  --  25   GLUCOSE mg/dL 160* 90     Results from last 7 days   Lab Units 03/17/23  0613 03/16/23  1332   WBC 10*3/mm3 6.25 8.16   HEMOGLOBIN g/dL 11.7* 13.9   HEMATOCRIT % 34.3* 41.5   PLATELETS 10*3/mm3 240 282         Results from last 7 days   Lab Units 03/16/23  1656 03/16/23  1332   HSTROP T ng/L 33* 38*                           Pertinent Radiology Results:    Imaging Results (All)     Procedure Component Value Units Date/Time    CT Abdomen Pelvis Without Contrast [[id number]] Collected: 03/17/23 0006     Updated: 03/17/23 1602    Narrative:      FINAL REPORT    TECHNIQUE:  Noncontrast exam    CLINICAL HISTORY:  Flank pain, kidney stone suspected, pt had mri contrast earlier  today    FINDINGS:  Note: Excreted contrast from prior MRI within the upper track could obscure urinary tract stone disease.    Abdomen:  Lung bases are clear.  Liver, spleen, pancreas and adrenal glands have a normal CT appearance.    The kidneys show no significant obstructive changes.  There are probable small left greater than right calyceal stones.  No obvious renal mass is present.    There is no bowel obstruction.    Pelvis: The appendix is normal.  Distal ureteral stones would be obscured.  Bladder is opacified by contrast.  There are small bilateral inguinal hernias.  There is mild prostatic enlargement.  No fluid collection or adenopathy is seen.      Impression:      1.  Bilateral nephrolithiasis without obstruction.  However  ureteral stones would be obscured by contrast given by the earlier examination.    2.  No acute abnormality.    Authenticated and Electronically Signed by GRACIA Fowler MD on  03/17/2023 12:06:06 AM    MRI Brain With & Without Contrast [[id number]] Collected: 03/16/23 1620     Updated: 03/16/23 1625    Narrative:      PROCEDURE: MRI BRAIN W WO CONTRAST-     HISTORY: dizziness/headache     COMPARISON:  None.     FINDINGS: Multiplanar MR imaging of the head was performed  without and  with contrast.  Additional thin section pre and postcontrast images of  the cerebellopontine angles and internal auditory canals were obtained.    There is no evidence of intracranial hemorrhage or mass.  The  ventricles are within normal limits with respect to size.  There is  no  evidence of shift of the midline structures.   No abnormal extra-axial  fluid collection is seen. The posterior fossa and brainstem have an  unremarkable appearance.  No abnormal contrast enhancement is  identified. On the diffusion weighted images,  no abnormal restricted  diffusion is seen.  Normal major vessel vascular flow voids are  identified.      There is no evidence of cerebellopontine angle mass or abnormal contrast  enhancement. The 7th and 8th nerve complexes appear normal. No mass or  abnormal contrast enhancement is seen in the internal auditory canals.       Impression:      No acute intracranial abnormality.           This report was signed and finalized on 3/16/2023 4:23 PM by Blas Barrios MD.    XR Chest 1 View [[id number]] Collected: 03/16/23 1416     Updated: 03/16/23 1420    Narrative:      CLINICAL INDICATION:    Weak/Dizzy/AMS triage protocol     EXAMINATION TECHNIQUE:   XR CHEST 1 VW-     COMPARISON:  Radiographs 05/03/2022.     FINDINGS:  Multiple surgical clips in the right mid chest with the pulmonary  architectural distortion in the right hilar region, likely postoperative  sequela. Cardiac and mediastinal contours are within  normal limits  except aortic atherosclerosis. Emphysema. No dense consolidation. No  pneumothorax or pleural effusion. Postsurgical changes in the right mid  thoracic cage.       Impression:      No acute cardiopulmonary findings. Chronic changes as above.     Images personally reviewed, interpreted and dictated by HANNA Gore.                This report was signed and finalized on 3/16/2023 2:18 PM by HANNA Gore.    CT Head Without Contrast [[id number]] Collected: 03/16/23 1414     Updated: 03/16/23 1418    Narrative:      HEAD CT     3/16/2023 1:54 PM      HISTORY:   dizziness, headache     TECHNIQUE:   Multiple axial CT images were performed from the foramen magnum to the  vertex. Coronal reformatted images were reconstructed from axial data  set. This study was performed with techniques to keep radiation doses as  low as reasonably achievable (ALARA). Individualized dose reduction  techniques using automated exposure control or adjustment of mA and/or  kV according to the patient size were employed. 821.28 mGy.cm     COMPARISON:   None     FINDINGS:   No acute intracranial hemorrhage or large acute cortical infarct. Mild  chronic small vessel ischemic white matter changes are present. No  significant cerebral volume loss. Ventricles are normal in size and  configuration. No midline shift. The basal cisterns are patent.  Intracranial arterial atherosclerotic calcifications.     No skull fracture. The visualized paranasal sinuses and mastoid air  cells are clear.       Impression:      No acute intracranial hemorrhage or large acute cortical infarct.        CRITICAL RESULT:  No.     COMMUNICATION:  Per this written report.     Images personally reviewed, interpreted, and dictated by HANNA Gore.                             This report was signed and finalized on 3/16/2023 2:16 PM by HANNA Gore.          Echo:      Condition on Discharge:      Stable.    Code status  during the hospital stay:    Code Status and Medical Interventions:   Ordered at: 03/16/23 5880     Code Status (Patient has no pulse and is not breathing):    CPR (Attempt to Resuscitate)     Medical Interventions (Patient has pulse or is breathing):    Full Support     Discharge Disposition:    Home or Self Care    Discharge Medications:       Discharge Medications      Changes to Medications      Instructions Start Date   escitalopram 5 MG tablet  Commonly known as: Lexapro  What changed: how much to take   5 mg, Oral, Daily      Levemir FlexTouch 100 UNIT/ML injection  Generic drug: insulin detemir  What changed: additional instructions   30 Units, Subcutaneous, 2 Times Daily      levocetirizine 5 MG tablet  Commonly known as: XYZAL  What changed: how much to take   Take 1/2 tablet by mouth Every Evening.      NovoLOG FlexPen 100 UNIT/ML solution pen-injector sc pen  Generic drug: insulin aspart  What changed:   · how much to take  · when to take this  · additional instructions   Inject as directed by correctional scale, MDD 20 units      predniSONE 10 MG tablet  Commonly known as: DELTASONE  What changed:   · how much to take  · how to take this  · when to take this  · reasons to take this   Take 1 Tablet by mouth Daily for 2 - 5 days as needed for a flare up, may repeat once a week      predniSONE 10 MG tablet  Commonly known as: DELTASONE  What changed: Another medication with the same name was changed. Make sure you understand how and when to take each.   Take 1 Tablet by mouth Daily for 2 - 5 days as needed for a flare up, may repeat once a week      Trulicity 0.75 MG/0.5ML solution pen-injector  Generic drug: Dulaglutide  What changed: additional instructions   0.75 mg, Subcutaneous, Weekly         Continue These Medications      Instructions Start Date   albuterol sulfate  (90 Base) MCG/ACT inhaler  Commonly known as: PROVENTIL HFA;VENTOLIN HFA;PROAIR HFA   Inhale 2 puffs by mouth Every 4 (Four)  Hours As Needed for Wheezing.      ALLERGY MEDICINE PO   1 tablet, Daily      aspirin 81 MG chewable tablet   81 mg, Oral, Daily      atorvastatin 20 MG tablet  Commonly known as: LIPITOR   20 mg, Oral, Daily      fluticasone 50 MCG/ACT nasal spray  Commonly known as: Flonase   Instill 2 sprays into each nostril daily.      folic acid 1 MG tablet  Commonly known as: FOLVITE   1 mg, Oral, Daily      folic acid 1 MG tablet  Commonly known as: FOLVITE   1 mg, Oral, Daily      Humira Pen 40 MG/0.4ML Pen-injector Kit  Generic drug: Adalimumab   40 mg, TAKE EVERY OTHER Monday.      methotrexate 2.5 MG tablet   20 mg, Oral, Weekly      methotrexate 2.5 MG tablet   20 mg, Oral, Weekly      omeprazole 40 MG capsule  Commonly known as: priLOSEC   40 mg, Oral, Daily      Prodigy No Coding Blood Gluc test strip  Generic drug: glucose blood   Use as directed 3-4 times a day      Prodigy No Coding Blood Gluc test strip  Generic drug: glucose blood   Test glucose three times daily for diabetes      promethazine-dextromethorphan 6.25-15 MG/5ML syrup  Commonly known as: PROMETHAZINE-DM   5 mL, Oral, 4 Times Daily PRN         Stop These Medications    amoxicillin-clavulanate 875-125 MG per tablet  Commonly known as: Augmentin          Discharge Diet:     Diet Instructions    Consistent Carbohydrate diet         Activity at Discharge:     Activity Instructions    As tolerated         Follow-up Appointments:    Additional Instructions for the Follow-ups that You Need to Schedule     Comprehensive Metabolic Panel    Mar 22, 2023 (Approximate)      Release to patient: Routine Release            Follow-up Information     Humberto Kumari MD Follow up on 3/23/2023.    Specialty: Internal Medicine  Why: @4PM  Contact information:  [address] [place] [address] [phone]                       Future Appointments   Date Time Provider Department Center   3/23/2023  4:00 PM Humberto Kumari MD [facility]   4/3/2023   3:30 PM Humberto Kumari MD [facility]   4/4/2023  1:45 PM Maryan Jarquin MD [facility]     Test Results Pending at Discharge:           Lucero Llamas DO  03/18/23  08:08 EDT    Time: I spent 22 minutes on this discharge activity which included: face-to-face encounter with the patient, reviewing the data in the system, coordination of the care with the nursing staff as well as consultants, documentation, and entering orders.     Dictated utilizing Dragon dictation.

## 2023-03-18 NOTE — CASE MANAGEMENT/SOCIAL WORK
Case Management Discharge Note           Provided Post Acute Provider List?: N/A  Provided Post Acute Provider Quality & Resource List?: N/A    Selected Continued Care - Discharged on 3/17/2023 Admission date: 3/16/2023 - Discharge disposition: Home or Self Care    Destination    No services have been selected for the patient.              Durable Medical Equipment    No services have been selected for the patient.              Dialysis/Infusion    No services have been selected for the patient.              Home Medical Care    No services have been selected for the patient.              Therapy    No services have been selected for the patient.              Community Resources    No services have been selected for the patient.              Community & DME    No services have been selected for the patient.                  Transportation Services  Private: Car    Final Discharge Disposition Code: 01 - home or self-care

## 2023-03-20 ENCOUNTER — TRANSITIONAL CARE MANAGEMENT TELEPHONE ENCOUNTER (OUTPATIENT)
Dept: CALL CENTER | Facility: HOSPITAL | Age: 68
End: 2023-03-20
Payer: COMMERCIAL

## 2023-03-20 NOTE — OUTREACH NOTE
Call Center TCM Note    Flowsheet Row Responses   [facility] [place], operated by [facility] patient discharged from? [facility]   Does the patient have one of the following disease processes/diagnoses(primary or secondary)? Other   TCM attempt successful? Yes   Call start time 1154   Call end time 1156   Discharge diagnosis Dizziness   Is patient permission given to speak with other caregiver? Yes   Does the patient have all medications ordered at discharge? N/A   Is the patient taking all medications as directed (includes completed medication regime)? Yes   Comments Hospital f/u with PCP on 3/23   Does the patient have an appointment with their PCP within 7 days of discharge? Yes   Has home health visited the patient within 72 hours of discharge? N/A   Psychosocial issues? No   Did the patient receive a copy of their discharge instructions? Yes   What is the patient's perception of their health status since discharge? Improving   Is the patient/caregiver able to teach back the hierarchy of who to call/visit for symptoms/problems? PCP, Specialist, Home health nurse, Urgent Care, ED, 911 Yes   TCM call completed? Yes   Wrap up additional comments Doing well, no questions, reviewed f/u appt with PCP for 3/23, he states spouse knows all his appts.   Call end time 1156   Would this patient benefit from a Referral to Amb Social Work? No   Is the patient interested in additional calls from an ambulatory ?  NOTE:  applies to high risk patients requiring additional follow-up. No          Karyn Edgar RN    3/20/2023, 11:56 EDT

## 2023-03-24 ENCOUNTER — LAB (OUTPATIENT)
Dept: LAB | Facility: HOSPITAL | Age: 68
End: 2023-03-24
Payer: COMMERCIAL

## 2023-03-24 DIAGNOSIS — N17.9 AKI (ACUTE KIDNEY INJURY): ICD-10-CM

## 2023-03-24 LAB
ALBUMIN SERPL-MCNC: 4.1 G/DL (ref 3.5–5.2)
ALBUMIN/GLOB SERPL: 1.4 G/DL
ALP SERPL-CCNC: 182 U/L (ref 39–117)
ALT SERPL W P-5'-P-CCNC: 24 U/L (ref 1–41)
ANION GAP SERPL CALCULATED.3IONS-SCNC: 9 MMOL/L (ref 5–15)
AST SERPL-CCNC: 27 U/L (ref 1–40)
BILIRUB SERPL-MCNC: 0.3 MG/DL (ref 0–1.2)
BUN SERPL-MCNC: 21 MG/DL (ref 8–23)
BUN/CREAT SERPL: 19.3 (ref 7–25)
CALCIUM SPEC-SCNC: 9.2 MG/DL (ref 8.6–10.5)
CHLORIDE SERPL-SCNC: 104 MMOL/L (ref 98–107)
CO2 SERPL-SCNC: 24 MMOL/L (ref 22–29)
CREAT SERPL-MCNC: 1.09 MG/DL (ref 0.76–1.27)
EGFRCR SERPLBLD CKD-EPI 2021: 74.9 ML/MIN/1.73
GLOBULIN UR ELPH-MCNC: 3 GM/DL
GLUCOSE SERPL-MCNC: 121 MG/DL (ref 65–99)
POTASSIUM SERPL-SCNC: 4.4 MMOL/L (ref 3.5–5.2)
PROT SERPL-MCNC: 7.1 G/DL (ref 6–8.5)
SODIUM SERPL-SCNC: 137 MMOL/L (ref 136–145)

## 2023-03-24 PROCEDURE — 36415 COLL VENOUS BLD VENIPUNCTURE: CPT

## 2023-03-24 PROCEDURE — 80053 COMPREHEN METABOLIC PANEL: CPT

## 2023-03-29 ENCOUNTER — READMISSION MANAGEMENT (OUTPATIENT)
Dept: CALL CENTER | Facility: HOSPITAL | Age: 68
End: 2023-03-29
Payer: COMMERCIAL

## 2023-03-29 NOTE — OUTREACH NOTE
Medical Week 2 Survey    Flowsheet Row Responses   [facility] patient discharged from? [facility]   Does the patient have one of the following disease processes/diagnoses(primary or secondary)? Other   Week 2 attempt successful? Yes   Call start time 1450   General alerts for this patient Spouse advises for follow up call to be called after 1530   Discharge diagnosis Dizziness   Call end time 1455   Is patient permission given to speak with other caregiver? Yes   Person spoke with today (if not patient) and relationship Spouse- Yandy   Meds reviewed with patient/caregiver? Yes   Is the patient taking all medications as directed (includes completed medication regime)? Yes   Does the patient have a primary care provider?  Yes   Comments regarding PCP Patient has a hospital fu with pcp on 04-03   Has the patient kept scheduled appointments due by today? N/A   Has home health visited the patient within 72 hours of discharge? N/A   Psychosocial issues? No   Did the patient receive a copy of their discharge instructions? Yes   Nursing interventions Reviewed instructions with patient   What is the patient's perception of their health status since discharge? New symptoms unrelated to diagnosis  [thrush]   Is the patient/caregiver able to teach back signs and symptoms related to disease process for when to call PCP? Yes   Is the patient/caregiver able to teach back signs and symptoms related to disease process for when to call 911? Yes   Is the patient/caregiver able to teach back the hierarchy of who to call/visit for symptoms/problems? PCP, Specialist, Home health nurse, Urgent Care, ED, 911 Yes   Week 2 Call Completed? Yes   Is the patient interested in additional calls from an ambulatory ?  NOTE:  applies to high risk patients requiring additional follow-up. No   Wrap up additional comments Spouse states overall patient is doing well. Has fu appt with pcp on 04/03- however patient now has thrush- pcp wants  to see patient first before medication. Advised can by mylanta and swish and spit to help with pain due to declining to go urgent care however if it gets worse advised patient to go to urgent care. no concerns or questions at this time.          Kristi JOHNSON - Registered Nurse

## 2023-04-04 ENCOUNTER — OFFICE VISIT (OUTPATIENT)
Dept: ENDOCRINOLOGY | Facility: CLINIC | Age: 68
End: 2023-04-04
Payer: COMMERCIAL

## 2023-04-04 VITALS
SYSTOLIC BLOOD PRESSURE: 104 MMHG | HEART RATE: 64 BPM | DIASTOLIC BLOOD PRESSURE: 54 MMHG | BODY MASS INDEX: 24.96 KG/M2 | OXYGEN SATURATION: 97 % | WEIGHT: 149.8 LBS | HEIGHT: 65 IN

## 2023-04-04 DIAGNOSIS — E11.65 TYPE 2 DIABETES MELLITUS WITH HYPERGLYCEMIA, WITH LONG-TERM CURRENT USE OF INSULIN: Primary | ICD-10-CM

## 2023-04-04 DIAGNOSIS — E78.5 HYPERLIPIDEMIA, UNSPECIFIED HYPERLIPIDEMIA TYPE: ICD-10-CM

## 2023-04-04 DIAGNOSIS — Z79.4 TYPE 2 DIABETES MELLITUS WITH HYPERGLYCEMIA, WITH LONG-TERM CURRENT USE OF INSULIN: Primary | ICD-10-CM

## 2023-04-04 LAB
EXPIRATION DATE: ABNORMAL
GLUCOSE BLDC GLUCOMTR-MCNC: 254 MG/DL (ref 70–130)
Lab: ABNORMAL

## 2023-04-04 PROCEDURE — 99214 OFFICE O/P EST MOD 30 MIN: CPT | Performed by: INTERNAL MEDICINE

## 2023-04-04 PROCEDURE — 82947 ASSAY GLUCOSE BLOOD QUANT: CPT | Performed by: INTERNAL MEDICINE

## 2023-04-04 RX ORDER — DULAGLUTIDE 1.5 MG/.5ML
1.5 INJECTION, SOLUTION SUBCUTANEOUS WEEKLY
Qty: 2 ML | Refills: 2 | Status: SHIPPED | OUTPATIENT
Start: 2023-04-04

## 2023-04-04 NOTE — PROGRESS NOTES
"Chief Complaint   Patient presents with   • Diabetes        HPI   Pascual Kaur is a 67 y.o. male had concerns including Diabetes.     Patient was hospitalized with kidney injury in the interim since last visit which was attributed to dehydration.  Patient reports that he believes he had a gastrointestinal illness from a coworker that resulted in this.  He has not been monitoring glucose values at home.  He does continue on Levemir 30 units twice daily and Trulicity 0.75 mg weekly.  He has not been routinely using correctional NovoLog.  He does report that he has been having to use more prednisone in the interim since last visit due to rheumatologic issues    Patient continues on atorvastatin 20 mg daily for hyperlipidemia.  Denies myalgias.    The following portions of the patient's history were reviewed and updated as appropriate: allergies, current medications and past social history.    Review of Systems   Gastrointestinal: Negative for nausea and vomiting.   Endocrine: Negative for polydipsia and polyuria.   Musculoskeletal: Positive for arthralgias.      /54   Pulse 64   Ht 165.1 cm (65\")   Wt 67.9 kg (149 lb 12.8 oz)   SpO2 97%   BMI 24.93 kg/m²      Physical Exam  Cardiovascular:      Pulses:           Dorsalis pedis pulses are 2+ on the right side and 2+ on the left side.        Posterior tibial pulses are 2+ on the right side and 2+ on the left side.   Feet:      Right foot:      Protective Sensation: 10 sites tested. 10 sites sensed.      Skin integrity: Skin integrity normal.      Toenail Condition: Right toenails are abnormally thick.      Left foot:      Protective Sensation: 10 sites tested. 10 sites sensed.      Skin integrity: Skin integrity normal.      Toenail Condition: Left toenails are abnormally thick.         Constitutional:  well developed; well nourished  no acute distress  appears stated age   ENT/Thyroid: not examined   Eyes: Conjunctiva: clear   Respiratory:  " breathing is unlabored  clear to auscultation bilaterally   Cardiovascular:  regular rate and rhythm   Chest:  Not performed.   Abdomen: Not performed.   : Not performed.   Musculoskeletal: Not performed   Skin: not performed.   Neuro: mental status, speech normal   Psych: mood and affect are within normal limits     Diabetic Foot Exam Performed and Monofilament Test Performed  Labs/Imaging   Latest Reference Range & Units 03/24/23 17:24 04/04/23 13:51   Glucose 70 - 130 mg/dL 121 (H) 254 !   Sodium 136 - 145 mmol/L 137    Potassium 3.5 - 5.2 mmol/L 4.4    CO2 22.0 - 29.0 mmol/L 24.0    Chloride 98 - 107 mmol/L 104    Anion Gap 5.0 - 15.0 mmol/L 9.0    Creatinine 0.76 - 1.27 mg/dL 1.09    BUN 8 - 23 mg/dL 21    BUN/Creatinine Ratio 7.0 - 25.0  19.3    Calcium 8.6 - 10.5 mg/dL 9.2    eGFR >60.0 mL/min/1.73 74.9    Alkaline Phosphatase 39 - 117 U/L 182 (H)    Total Protein 6.0 - 8.5 g/dL 7.1    ALT (SGPT) 1 - 41 U/L 24    AST (SGOT) 1 - 40 U/L 27    Total Bilirubin 0.0 - 1.2 mg/dL 0.3    Albumin 3.5 - 5.2 g/dL 4.1    Globulin gm/dL 3.0    A/G Ratio g/dL 1.4    (H): Data is abnormally high  !: Data is abnormal     Latest Reference Range & Units 03/16/23 13:32   Hemoglobin A1C 4.80 - 5.60 % 7.70 (H)   (H): Data is abnormally high    Diagnoses and all orders for this visit:    1. Type 2 diabetes mellitus with hyperglycemia, with long-term current use of insulin (Primary)  -     Cancel: POC Glycosylated Hemoglobin (Hb A1C)  -     POC Glucose, Blood    2. Hyperlipidemia, unspecified hyperlipidemia type  Other orders  -     Dulaglutide (Trulicity) 1.5 MG/0.5ML solution pen-injector; Inject 1.5 mg under the skin into the appropriate area as directed 1 (One) Time Per Week.  Dispense: 2 mL; Refill: 2    Diabetes most recent A1c 7.7%, worsened from previous. Too soon to repeat.  Patient had hyperglycemic during office visit  Patient to increase Trulicity to 1.5 mg weekly  Patient to continue Levemir 30 units twice  daily  Patient to continue correctional NovoLog, as needed.  Discussed importance of routine glucose monitoring.  Patient was advised to monitor blood sugar 2-3 times daily.  Patient was instructed to bring glucometer to all future appointments. Patient should contact the clinic between appointments with hypoglycemia or persistent hyperglycemia.  Discussed signs and symptoms of hypoglycemia as well as hypoglycemia management via the rule of 15's.  Discussed potential for long-term complications with uncontrolled diabetes including nephropathy, neuropathy, retinopathy, increased risk for cardiac disease.  Discussed the role of diet and exercise in the management of diabetes.  Patient will continue atorvastatin 20 mg daily for hyperlipidemia  CMP is up-to-date from 2023, EGFR 74.9  Urine microalbumin up-to-date from May 2022  Lipid panel up-to-date from May 2022.  Patient to continue atorvastatin.  Eye exam scheduled  Foot exam completed today with intact sensation    Return in about 4 months (around 8/4/2023) for Next scheduled follow up. The patient was instructed to contact the clinic with any interval questions or concerns.    Maryan Jarquin MD   Endocrinologist    Dictated Utilizing Dragon Dictation

## 2023-04-06 ENCOUNTER — READMISSION MANAGEMENT (OUTPATIENT)
Dept: CALL CENTER | Facility: HOSPITAL | Age: 68
End: 2023-04-06
Payer: COMMERCIAL

## 2023-04-06 NOTE — OUTREACH NOTE
Medical Week 3 Survey    Flowsheet Row Responses   [facility] patient discharged from? [facility]   Does the patient have one of the following disease processes/diagnoses(primary or secondary)? Other   Week 3 attempt successful? Yes   Call start time 1004   Call end time 1007   Discharge diagnosis Dizziness   Meds reviewed with patient/caregiver? Yes   Is the patient having any side effects they believe may be caused by any medication additions or changes? No   Does the patient have all medications ordered at discharge? N/A   Is the patient taking all medications as directed (includes completed medication regime)? Yes   Does the patient have a primary care provider?  Yes   Does the patient have an appointment with their PCP within 7 days of discharge? Yes   Has the patient kept scheduled appointments due by today? Yes   Has home health visited the patient within 72 hours of discharge? N/A   Psychosocial issues? No   Comments pt back to work   Did the patient receive a copy of their discharge instructions? Yes   Nursing interventions Reviewed instructions with patient   What is the patient's perception of their health status since discharge? Improving   Is the patient/caregiver able to teach back signs and symptoms related to disease process for when to call PCP? Yes   Is the patient/caregiver able to teach back signs and symptoms related to disease process for when to call 911? Yes   Is the patient/caregiver able to teach back the hierarchy of who to call/visit for symptoms/problems? PCP, Specialist, Home health nurse, Urgent Care, ED, 911 Yes   Additional teach back comments pt states condition much improved, back to work   Week 3 Call Completed? Yes   Graduated Yes   Is the patient interested in additional calls from an ambulatory ?  NOTE:  applies to high risk patients requiring additional follow-up. No   Did the patient feel the follow up calls were helpful during their recovery period? Yes    Graduated/Revoked comments states much improved, back to work, no more calls needed          Nasrin ALEXANDER - Registered Nurse

## 2023-04-10 ENCOUNTER — OFFICE VISIT (OUTPATIENT)
Dept: INTERNAL MEDICINE | Facility: CLINIC | Age: 68
End: 2023-04-10
Payer: COMMERCIAL

## 2023-04-10 VITALS
DIASTOLIC BLOOD PRESSURE: 66 MMHG | BODY MASS INDEX: 26.33 KG/M2 | HEIGHT: 65 IN | WEIGHT: 158 LBS | OXYGEN SATURATION: 97 % | SYSTOLIC BLOOD PRESSURE: 130 MMHG | HEART RATE: 95 BPM

## 2023-04-10 DIAGNOSIS — E11.65 TYPE 2 DIABETES MELLITUS WITH HYPERGLYCEMIA, WITH LONG-TERM CURRENT USE OF INSULIN: ICD-10-CM

## 2023-04-10 DIAGNOSIS — Z23 NEED FOR COVID-19 VACCINE: ICD-10-CM

## 2023-04-10 DIAGNOSIS — J41.0 SIMPLE CHRONIC BRONCHITIS: ICD-10-CM

## 2023-04-10 DIAGNOSIS — Z79.4 TYPE 2 DIABETES MELLITUS WITH HYPERGLYCEMIA, WITH LONG-TERM CURRENT USE OF INSULIN: ICD-10-CM

## 2023-04-10 DIAGNOSIS — M05.79 RHEUMATOID ARTHRITIS INVOLVING MULTIPLE SITES WITH POSITIVE RHEUMATOID FACTOR: ICD-10-CM

## 2023-04-10 DIAGNOSIS — J02.9 SORE THROAT: ICD-10-CM

## 2023-04-10 DIAGNOSIS — R05.1 ACUTE COUGH: Primary | ICD-10-CM

## 2023-04-10 LAB
EXPIRATION DATE: NORMAL
EXPIRATION DATE: NORMAL
FLUAV AG UPPER RESP QL IA.RAPID: NOT DETECTED
FLUBV AG UPPER RESP QL IA.RAPID: NOT DETECTED
INTERNAL CONTROL: NORMAL
INTERNAL CONTROL: NORMAL
Lab: NORMAL
Lab: NORMAL
S PYO AG THROAT QL: NEGATIVE
SARS-COV-2 AG UPPER RESP QL IA.RAPID: NOT DETECTED

## 2023-04-10 NOTE — PROGRESS NOTES
"Subjective  Pascual Kaur is a 67 y.o. male    HPI coming in for follow-up patient with diabetes history of rheumatoid arthritis with recent exacerbation for which his methotrexate dose has been increased has occasional cough recent complains of a sore throat which appears to be doing better now.  History of lung CA.  He does not smoke    The following portions of the patient's history were reviewed and updated as appropriate: allergies, current medications, past family history, past medical history, past social history, past surgical history, and problem list.     Review of Systems   Constitutional: Positive for fatigue. Negative for activity change, appetite change and fever.   HENT: Negative for congestion, ear discharge, ear pain and trouble swallowing.    Eyes: Negative for photophobia and visual disturbance.   Respiratory: Negative for cough and shortness of breath.    Cardiovascular: Negative for chest pain and palpitations.   Gastrointestinal: Negative for abdominal distention, abdominal pain, constipation, diarrhea, nausea and vomiting.   Endocrine: Negative.    Genitourinary: Negative for dysuria, hematuria and urgency.   Musculoskeletal: Positive for arthralgias and gait problem. Negative for back pain, joint swelling and myalgias.   Skin: Negative for color change and rash.   Allergic/Immunologic: Negative.    Neurological: Negative for dizziness, weakness, light-headedness and headaches.   Hematological: Negative for adenopathy. Does not bruise/bleed easily.   Psychiatric/Behavioral: Positive for sleep disturbance. Negative for agitation, confusion and dysphoric mood. The patient is not nervous/anxious.        Visit Vitals  /66   Pulse 95   Ht 165.1 cm (65\")   Wt 71.7 kg (158 lb)   SpO2 97%   BMI 26.29 kg/m²       Objective  Physical Exam  Constitutional:       General: He is not in acute distress.     Appearance: He is well-developed.   HENT:      Nose: Nose normal.   Eyes:      " General: No scleral icterus.     Conjunctiva/sclera: Conjunctivae normal.   Neck:      Thyroid: No thyromegaly.      Trachea: No tracheal deviation.   Cardiovascular:      Rate and Rhythm: Normal rate and regular rhythm.      Heart sounds: No murmur heard.    No friction rub.   Pulmonary:      Effort: No respiratory distress.      Breath sounds: No wheezing or rales.   Abdominal:      General: There is no distension.      Palpations: Abdomen is soft. There is no mass.      Tenderness: There is no abdominal tenderness. There is no guarding.   Musculoskeletal:         General: Deformity present. Normal range of motion.   Lymphadenopathy:      Cervical: No cervical adenopathy.   Skin:     General: Skin is warm and dry.      Findings: No erythema or rash.   Neurological:      Mental Status: He is alert and oriented to person, place, and time.      Cranial Nerves: No cranial nerve deficit.      Coordination: Coordination normal.      Deep Tendon Reflexes: Reflexes are normal and symmetric.   Psychiatric:         Behavior: Behavior normal.         Thought Content: Thought content normal.         Judgment: Judgment normal.         Diagnoses and all orders for this visit:    Acute cough COVID flu and strep tests are negative continue with conservative measures  -     POCT SARS-CoV-2 Antigen SUKHJINDER    Sore throat  -     POC Rapid Strep A    Type 2 diabetes mellitus with hyperglycemia, with long-term current use of insulin continue with the dietary restrictions follow A1c tolerating Trulicity    Rheumatoid arthritis involving multiple sites with positive rheumatoid factor continue with current management on prednisone taper    Simple chronic bronchitis

## 2023-06-15 RX ORDER — DULAGLUTIDE 0.75 MG/.5ML
0.75 INJECTION, SOLUTION SUBCUTANEOUS WEEKLY
Qty: 2 ML | Refills: 3 | Status: SHIPPED | OUTPATIENT
Start: 2023-06-15

## 2023-06-15 NOTE — TELEPHONE ENCOUNTER
Yandy was notified and the pt requested to get the Trulicity 0.75. pt does have an appt coming up with his PCP.

## 2023-06-15 NOTE — TELEPHONE ENCOUNTER
----- Message from Maryan Jarquin MD sent at 6/15/2023 10:55 AM EDT -----  Regarding: FW: Edie  Contact: [phone]        ----- Message -----  From: Jacy Cole MA  Sent: 6/15/2023  10:44 AM EDT  To: Maryan Jarqiun MD  Subject: FW: Trulicity                                    Called the wife and she stated the pt got real dizzy this am to the point he had to lay on the floor. She said you gave him the higher dose due to his increase in A1C. She failed to mention that she had been in the hospital and he missed a few doses of 0.75 Trulicity. Since stating the 1.5 he is having dizziness and weakness.    He would like to go back on 0.75. He will need a new script sent to the phar. Please advise and I will call her back.  ----- Message -----  From: Pascual Kaur  Sent: 6/15/2023   8:05 AM EDT  To: e [facility]  Subject: Trulicity                                        This message is being sent by Yandy Kaur on behalf of Pascual Kaur.    This is Yandy, could you give me a call about Radha tryuriity dose. You can reach me at [phone] or my work number [phone]. Thank you

## 2023-06-20 NOTE — PLAN OF CARE
Goal Outcome Evaluation:  Plan of Care Reviewed With: patient           Outcome Evaluation: New Admission.   Group Topic: BH Recovery Skills    Date: 6/20/2023  Start Time:  1:00 PM  End Time:  1:45 PM  Facilitators: Angelina العلي RN    Focus: sleep hygiene and PAWs  Number in attendance: 8    Method: Group  Attendance: Present  Participation: Active  Patient Response: Attentive  Mood: Normal  Affect: Type: Euthymic (normal mood)   Range: Full (normal)   Congruency: Congruent   Stability: Stable  Behavior/Socialization: Cooperative  Thought Process: Organized  Task Performance: Follows directions  Patient Evaluation: Independent - full participation

## 2023-06-30 ENCOUNTER — TELEPHONE (OUTPATIENT)
Dept: INTERNAL MEDICINE | Facility: CLINIC | Age: 68
End: 2023-06-30

## 2023-06-30 NOTE — TELEPHONE ENCOUNTER
Spoke with Yandy, she would not give much detail but is having concerns of early onset dementia. Patient unwilling to see another provider regarding this. Appointment scheduled for 07/28.

## 2023-07-07 ENCOUNTER — TELEPHONE (OUTPATIENT)
Dept: INTERNAL MEDICINE | Facility: CLINIC | Age: 68
End: 2023-07-07

## 2023-07-07 NOTE — TELEPHONE ENCOUNTER
Spoke with Yandy, is going to list concerns and send in [employer] message to be forwarded to  to address when he returns to office.

## 2023-07-07 NOTE — TELEPHONE ENCOUNTER
Caller: Yandy Kaur    Relationship: Emergency Contact    Best call back number: [phone]    What is the best time to reach you: ANY    Who are you requesting to speak with (clinical staff, provider,  specific staff member): CLINICAL      What was the call regarding:    WIFE WOULD LIKE TO DISCUSS NEW SIGNS AND SYMPTOMS PRIVATELY BEFORE THE APPOINTMENT ON 7/28    Is it okay if the provider responds through MyChart: NO

## 2023-07-28 ENCOUNTER — OFFICE VISIT (OUTPATIENT)
Dept: INTERNAL MEDICINE | Facility: CLINIC | Age: 68
End: 2023-07-28
Payer: COMMERCIAL

## 2023-07-28 VITALS
TEMPERATURE: 97.1 F | DIASTOLIC BLOOD PRESSURE: 76 MMHG | BODY MASS INDEX: 24.83 KG/M2 | WEIGHT: 149 LBS | SYSTOLIC BLOOD PRESSURE: 103 MMHG | HEART RATE: 89 BPM | HEIGHT: 65 IN | OXYGEN SATURATION: 92 %

## 2023-07-28 DIAGNOSIS — E29.1 HYPOGONADISM IN MALE: ICD-10-CM

## 2023-07-28 DIAGNOSIS — R55 VASOVAGAL SYNCOPE: ICD-10-CM

## 2023-07-28 DIAGNOSIS — F32.9 REACTIVE DEPRESSION: Primary | ICD-10-CM

## 2023-07-28 DIAGNOSIS — G47.33 OBSTRUCTIVE SLEEP APNEA SYNDROME: ICD-10-CM

## 2023-07-28 DIAGNOSIS — R53.83 OTHER FATIGUE: ICD-10-CM

## 2023-07-28 PROCEDURE — 99214 OFFICE O/P EST MOD 30 MIN: CPT | Performed by: INTERNAL MEDICINE

## 2023-07-28 NOTE — PROGRESS NOTES
Subjective  Pascual Kaur is a 68 y.o. male    HPI coming in for follow-up some of the history provided by his wife who is accompanying him because she thinks the patient is not a good historian.  He has a longstanding history of chronic bronchitis and COPD history of lung CA status post resection history of poorly controlled diabetes now following up with endocrinology with better control complains of generalized fatigue has episodic anxiety at nighttime occasional lightheadedness which is not related to change in posture.  He denies chest pain or palpitations has not had ester syncope.  Wife thinks he could be depressed    The following portions of the patient's history were reviewed and updated as appropriate: allergies, current medications, past family history, past medical history, past social history, past surgical history, and problem list.     Review of Systems   Constitutional:  Positive for fatigue. Negative for activity change, appetite change and fever.   HENT:  Negative for congestion, ear discharge, ear pain and trouble swallowing.    Eyes:  Negative for photophobia and visual disturbance.   Respiratory:  Negative for cough and shortness of breath.    Cardiovascular:  Negative for chest pain and palpitations.   Gastrointestinal:  Negative for abdominal distention, abdominal pain, constipation, diarrhea, nausea and vomiting.   Endocrine: Negative.    Genitourinary:  Negative for dysuria, hematuria and urgency.   Musculoskeletal:  Positive for arthralgias. Negative for back pain, joint swelling and myalgias.   Skin:  Negative for color change and rash.   Allergic/Immunologic: Negative.    Neurological:  Negative for dizziness, weakness, light-headedness and headaches.   Hematological:  Negative for adenopathy. Does not bruise/bleed easily.   Psychiatric/Behavioral:  Positive for sleep disturbance. Negative for agitation, confusion and dysphoric mood. The patient is not nervous/anxious.   "    Visit Vitals  /76 (BP Location: Right arm, Patient Position: Sitting, Cuff Size: Adult)   Pulse 89   Temp 97.1 °F (36.2 °C)   Ht 165.1 cm (65\")   Wt 67.6 kg (149 lb)   SpO2 92%   BMI 24.79 kg/m²       Objective  Physical Exam  Constitutional:       General: He is not in acute distress.     Appearance: He is well-developed.   HENT:      Nose: Nose normal.   Eyes:      General: No scleral icterus.     Conjunctiva/sclera: Conjunctivae normal.   Neck:      Thyroid: No thyromegaly.      Trachea: No tracheal deviation.   Cardiovascular:      Rate and Rhythm: Normal rate and regular rhythm.      Heart sounds: No murmur heard.    No friction rub.   Pulmonary:      Effort: No respiratory distress.      Breath sounds: No wheezing or rales.   Abdominal:      General: There is no distension.      Palpations: Abdomen is soft. There is no mass.      Tenderness: There is no abdominal tenderness. There is no guarding.   Musculoskeletal:         General: Deformity present. Normal range of motion.   Lymphadenopathy:      Cervical: No cervical adenopathy.   Skin:     General: Skin is warm and dry.      Findings: No erythema or rash.   Neurological:      Mental Status: He is alert and oriented to person, place, and time.      Cranial Nerves: No cranial nerve deficit.      Coordination: Coordination normal.      Deep Tendon Reflexes: Reflexes are normal and symmetric.   Psychiatric:         Behavior: Behavior normal.         Thought Content: Thought content normal.         Judgment: Judgment normal.       Diagnoses and all orders for this visit:    Reactive depression.  Has been on Lexapro.  Consider behavioral health consult  -     Cancel: Ambulatory Referral to Psychiatry  -     Ambulatory Referral to Psychiatry    Obstructive sleep apnea syndrome suspected.  Referral for sleep study  -     Ambulatory Referral to Pulmonology    Hypogonadism in male complains of generalized fatigue, decreased libido.  Check levels  -     " Testosterone    Other fatigue  -     Comprehensive Metabolic Panel  -     CBC (No Diff)  -     TSH Rfx On Abnormal To Free T4    Vasovagal syncope without palpitations rule out arrhythmia.  Recent EKG okay  -     Holter Monitor - 72 Hour Up To 15 Days; Future      Answers submitted by the patient for this visit:  Primary Reason for Visit (Submitted on 7/27/2023)  What is the primary reason for your visit?: Other  Other (Submitted on 7/27/2023)  Please describe your symptoms.: As described in previous note  Have you had these symptoms before?: Yes  How long have you been having these symptoms?: Greater than 2 weeks  Please list any medications you are currently taking for this condition.: None  Please describe any probable cause for these symptoms. : Not sure

## 2023-07-29 LAB
ALBUMIN SERPL-MCNC: 4.5 G/DL (ref 3.9–4.9)
ALBUMIN/GLOB SERPL: 1.8 {RATIO} (ref 1.2–2.2)
ALP SERPL-CCNC: 149 IU/L (ref 44–121)
ALT SERPL-CCNC: 75 IU/L (ref 0–44)
AST SERPL-CCNC: 48 IU/L (ref 0–40)
BILIRUB SERPL-MCNC: 0.5 MG/DL (ref 0–1.2)
BUN SERPL-MCNC: 28 MG/DL (ref 8–27)
BUN/CREAT SERPL: 16 (ref 10–24)
CALCIUM SERPL-MCNC: 9.5 MG/DL (ref 8.6–10.2)
CHLORIDE SERPL-SCNC: 104 MMOL/L (ref 96–106)
CO2 SERPL-SCNC: 24 MMOL/L (ref 20–29)
CREAT SERPL-MCNC: 1.76 MG/DL (ref 0.76–1.27)
EGFRCR SERPLBLD CKD-EPI 2021: 42 ML/MIN/1.73
ERYTHROCYTE [DISTWIDTH] IN BLOOD BY AUTOMATED COUNT: 15.7 % (ref 11.6–15.4)
GLOBULIN SER CALC-MCNC: 2.5 G/DL (ref 1.5–4.5)
GLUCOSE SERPL-MCNC: 63 MG/DL (ref 70–99)
HCT VFR BLD AUTO: 38.7 % (ref 37.5–51)
HGB BLD-MCNC: 12.8 G/DL (ref 13–17.7)
MCH RBC QN AUTO: 29.9 PG (ref 26.6–33)
MCHC RBC AUTO-ENTMCNC: 33.1 G/DL (ref 31.5–35.7)
MCV RBC AUTO: 90 FL (ref 79–97)
PLATELET # BLD AUTO: 200 X10E3/UL (ref 150–450)
POTASSIUM SERPL-SCNC: 4.4 MMOL/L (ref 3.5–5.2)
PROT SERPL-MCNC: 7 G/DL (ref 6–8.5)
RBC # BLD AUTO: 4.28 X10E6/UL (ref 4.14–5.8)
SODIUM SERPL-SCNC: 143 MMOL/L (ref 134–144)
TESTOST SERPL-MCNC: 357 NG/DL (ref 264–916)
TSH SERPL DL<=0.005 MIU/L-ACNC: 1.38 UIU/ML (ref 0.45–4.5)
WBC # BLD AUTO: 7.9 X10E3/UL (ref 3.4–10.8)

## 2023-08-01 DIAGNOSIS — K75.9 HEPATITIS: Primary | ICD-10-CM

## 2023-08-09 ENCOUNTER — LAB (OUTPATIENT)
Dept: LAB | Facility: HOSPITAL | Age: 68
End: 2023-08-09
Payer: COMMERCIAL

## 2023-08-09 ENCOUNTER — OFFICE VISIT (OUTPATIENT)
Dept: ENDOCRINOLOGY | Facility: CLINIC | Age: 68
End: 2023-08-09
Payer: COMMERCIAL

## 2023-08-09 VITALS
HEART RATE: 108 BPM | HEIGHT: 65 IN | DIASTOLIC BLOOD PRESSURE: 74 MMHG | OXYGEN SATURATION: 95 % | SYSTOLIC BLOOD PRESSURE: 110 MMHG | BODY MASS INDEX: 24.83 KG/M2 | WEIGHT: 149 LBS

## 2023-08-09 DIAGNOSIS — Z79.4 TYPE 2 DIABETES MELLITUS WITH HYPERGLYCEMIA, WITH LONG-TERM CURRENT USE OF INSULIN: ICD-10-CM

## 2023-08-09 DIAGNOSIS — E11.65 TYPE 2 DIABETES MELLITUS WITH HYPERGLYCEMIA, WITH LONG-TERM CURRENT USE OF INSULIN: ICD-10-CM

## 2023-08-09 DIAGNOSIS — E78.5 HYPERLIPIDEMIA, UNSPECIFIED HYPERLIPIDEMIA TYPE: ICD-10-CM

## 2023-08-09 DIAGNOSIS — Z79.4 TYPE 2 DIABETES MELLITUS WITH HYPERGLYCEMIA, WITH LONG-TERM CURRENT USE OF INSULIN: Primary | ICD-10-CM

## 2023-08-09 DIAGNOSIS — E11.65 TYPE 2 DIABETES MELLITUS WITH HYPERGLYCEMIA, WITH LONG-TERM CURRENT USE OF INSULIN: Primary | ICD-10-CM

## 2023-08-09 LAB
ALBUMIN SERPL-MCNC: 4.2 G/DL (ref 3.5–5.2)
ALBUMIN/GLOB SERPL: 1.6 G/DL
ALP SERPL-CCNC: 196 U/L (ref 39–117)
ALT SERPL W P-5'-P-CCNC: 57 U/L (ref 1–41)
ANION GAP SERPL CALCULATED.3IONS-SCNC: 10 MMOL/L (ref 5–15)
AST SERPL-CCNC: 38 U/L (ref 1–40)
BILIRUB SERPL-MCNC: 0.3 MG/DL (ref 0–1.2)
BUN SERPL-MCNC: 21 MG/DL (ref 8–23)
BUN/CREAT SERPL: 18.8 (ref 7–25)
CALCIUM SPEC-SCNC: 9.4 MG/DL (ref 8.6–10.5)
CHLORIDE SERPL-SCNC: 101 MMOL/L (ref 98–107)
CHOLEST SERPL-MCNC: 127 MG/DL (ref 0–200)
CO2 SERPL-SCNC: 27 MMOL/L (ref 22–29)
CREAT SERPL-MCNC: 1.12 MG/DL (ref 0.76–1.27)
EGFRCR SERPLBLD CKD-EPI 2021: 72 ML/MIN/1.73
EXPIRATION DATE: ABNORMAL
EXPIRATION DATE: NORMAL
GLOBULIN UR ELPH-MCNC: 2.6 GM/DL
GLUCOSE BLDC GLUCOMTR-MCNC: 277 MG/DL (ref 70–130)
GLUCOSE SERPL-MCNC: 215 MG/DL (ref 65–99)
HBA1C MFR BLD: 6.7 %
HDLC SERPL-MCNC: 32 MG/DL (ref 40–60)
LDLC SERPL CALC-MCNC: 63 MG/DL (ref 0–100)
LDLC/HDLC SERPL: 1.78 {RATIO}
Lab: ABNORMAL
Lab: NORMAL
POTASSIUM SERPL-SCNC: 4.5 MMOL/L (ref 3.5–5.2)
PROT SERPL-MCNC: 6.8 G/DL (ref 6–8.5)
SODIUM SERPL-SCNC: 138 MMOL/L (ref 136–145)
TRIGL SERPL-MCNC: 190 MG/DL (ref 0–150)
VLDLC SERPL-MCNC: 32 MG/DL (ref 5–40)

## 2023-08-09 PROCEDURE — 83036 HEMOGLOBIN GLYCOSYLATED A1C: CPT | Performed by: INTERNAL MEDICINE

## 2023-08-09 PROCEDURE — 99214 OFFICE O/P EST MOD 30 MIN: CPT | Performed by: INTERNAL MEDICINE

## 2023-08-09 PROCEDURE — 82947 ASSAY GLUCOSE BLOOD QUANT: CPT | Performed by: INTERNAL MEDICINE

## 2023-08-09 PROCEDURE — 82043 UR ALBUMIN QUANTITATIVE: CPT

## 2023-08-09 PROCEDURE — 80061 LIPID PANEL: CPT

## 2023-08-09 PROCEDURE — 82570 ASSAY OF URINE CREATININE: CPT

## 2023-08-09 PROCEDURE — 80053 COMPREHEN METABOLIC PANEL: CPT | Performed by: INTERNAL MEDICINE

## 2023-08-09 NOTE — PROGRESS NOTES
"Chief Complaint   Patient presents with    Diabetes        HPI   Pascual Kaur is a 68 y.o. male had concerns including Diabetes.        Patient reports no significant health changes in the interim since last visit.  He reports that after last visit he realized that he probably had missed several doses of his Trulicity while his wife is in the hospital which may have been the cause of his A1c elevation.  He did try higher dose of Trulicity but had concern for adverse effects and thus resumed prior dose of 0.75 mg weekly.  He reports his values at home generally in the low 100s.    Current diabetes medications include the following:  Levemir 30 units twice daily  Trulicity 0.75 mg weekly    Data from glucometer was reviewed, 28-day average 151    Patient continues on atorvastatin 20 mg daily for hyperlipidemia.  Patient is scheduled to repeat labs he denies myalgias.  Patient is supposed to have repeat labs this afternoon for his PCP.    The following portions of the patient's history were reviewed and updated as appropriate: allergies, current medications, and past social history.    Review of Systems   Gastrointestinal:  Negative for nausea and vomiting.   Endocrine: Negative for cold intolerance and heat intolerance.   Musculoskeletal:  Negative for myalgias.      /74 (BP Location: Left arm, Patient Position: Sitting, Cuff Size: Adult)   Pulse 108   Ht 165.1 cm (65\")   Wt 67.6 kg (149 lb)   SpO2 95%   BMI 24.79 kg/mý      Physical Exam      Constitutional:  well developed; well nourished  no acute distress  appears stated age   ENT/Thyroid: not examined   Eyes: Conjunctiva: clear   Respiratory:  breathing is unlabored  clear to auscultation bilaterally   Cardiovascular:  regular rate and rhythm   Chest:  Not performed.   Abdomen: Not performed.   : Not performed.   Musculoskeletal: Not performed   Skin: not performed.   Neuro: mental status, speech normal   Psych: mood and affect are " within normal limits       Labs/Imaging   Latest Reference Range & Units 08/09/23 14:57 08/09/23 14:58   Glucose 70 - 130 mg/dL 277 !    Hemoglobin A1C %  6.7   !: Data is abnormal    Diagnoses and all orders for this visit:    1. Type 2 diabetes mellitus with hyperglycemia, with long-term current use of insulin (Primary)  -     POC Glucose, Blood  -     POC Glycosylated Hemoglobin (Hb A1C)  -     Lipid Panel; Future  -     Microalbumin / Creatinine Urine Ratio - Urine, Clean Catch; Future  Hemoglobin A1c at goal, 6.7%  Glucose elevated during office visit, patient does report that he had sweets this AM which is a rarity for him.  Patient will continue Trulicity 0.75 mg weekly  Patient will continue Levemir 30 units twice daily  Patient was advised to monitor blood sugar 2 times daily.  Patient was instructed to bring glucometer to all future appointments. Patient should contact the clinic between appointments with hypoglycemia or persistent hyperglycemia.  Discussed signs and symptoms of hypoglycemia as well as hypoglycemia management via the rule of 15's.  Discussed potential for long-term complications with uncontrolled diabetes including nephropathy, neuropathy, retinopathy, increased risk for cardiac disease.  Discussed the role of diet and exercise in the management of diabetes.   CMP reviewed from July 2023  Repeat lipid panel due, ordered  Repeat urine microalbumin due, ordered    2. Hyperlipidemia, unspecified hyperlipidemia type  Repeat lipid due, ordered.  Patient will continue atorvastatin       Return in about 6 months (around 2/9/2024) for Next scheduled follow up. The patient was instructed to contact the clinic with any interval questions or concerns.    Maryan Jarquin MD   Endocrinologist    Dictated Utilizing Dragon Dictation

## 2023-08-10 LAB
ALBUMIN UR-MCNC: 86.1 MG/DL
CREAT UR-MCNC: 110 MG/DL
MICROALBUMIN/CREAT UR: 782.7 MG/G

## 2023-09-06 ENCOUNTER — LAB (OUTPATIENT)
Dept: LAB | Facility: HOSPITAL | Age: 68
End: 2023-09-06
Payer: COMMERCIAL

## 2023-09-06 ENCOUNTER — TRANSCRIBE ORDERS (OUTPATIENT)
Dept: LAB | Facility: HOSPITAL | Age: 68
End: 2023-09-06
Payer: COMMERCIAL

## 2023-09-06 DIAGNOSIS — M06.09 RHEUMATOID ARTHRITIS OF MULTIPLE SITES WITHOUT RHEUMATOID FACTOR: Primary | ICD-10-CM

## 2023-09-06 DIAGNOSIS — E11.65 TYPE II DIABETES MELLITUS WITH HYPEROSMOLARITY, UNCONTROLLED: ICD-10-CM

## 2023-09-06 DIAGNOSIS — M79.10 MYALGIA: ICD-10-CM

## 2023-09-06 DIAGNOSIS — E11.00 TYPE II DIABETES MELLITUS WITH HYPEROSMOLARITY, UNCONTROLLED: ICD-10-CM

## 2023-09-06 DIAGNOSIS — M06.09 RHEUMATOID ARTHRITIS OF MULTIPLE SITES WITHOUT RHEUMATOID FACTOR: ICD-10-CM

## 2023-09-06 DIAGNOSIS — M15.0 PRIMARY GENERALIZED HYPERTROPHIC OSTEOARTHROSIS: ICD-10-CM

## 2023-09-06 LAB
ALBUMIN SERPL-MCNC: 3.9 G/DL (ref 3.5–5.2)
ALBUMIN/GLOB SERPL: 1.4 G/DL
ALP SERPL-CCNC: 207 U/L (ref 39–117)
ALT SERPL W P-5'-P-CCNC: 57 U/L (ref 1–41)
ANION GAP SERPL CALCULATED.3IONS-SCNC: 10.1 MMOL/L (ref 5–15)
AST SERPL-CCNC: 34 U/L (ref 1–40)
BASOPHILS # BLD AUTO: 0.07 10*3/MM3 (ref 0–0.2)
BASOPHILS NFR BLD AUTO: 0.9 % (ref 0–1.5)
BILIRUB SERPL-MCNC: 0.3 MG/DL (ref 0–1.2)
BUN SERPL-MCNC: 26 MG/DL (ref 8–23)
BUN/CREAT SERPL: 19.4 (ref 7–25)
CALCIUM SPEC-SCNC: 9.1 MG/DL (ref 8.6–10.5)
CHLORIDE SERPL-SCNC: 99 MMOL/L (ref 98–107)
CO2 SERPL-SCNC: 28.9 MMOL/L (ref 22–29)
CREAT SERPL-MCNC: 1.34 MG/DL (ref 0.76–1.27)
CRP SERPL-MCNC: <0.3 MG/DL (ref 0–0.5)
DEPRECATED RDW RBC AUTO: 45.1 FL (ref 37–54)
EGFRCR SERPLBLD CKD-EPI 2021: 58.1 ML/MIN/1.73
EOSINOPHIL # BLD AUTO: 0.37 10*3/MM3 (ref 0–0.4)
EOSINOPHIL NFR BLD AUTO: 5 % (ref 0.3–6.2)
ERYTHROCYTE [DISTWIDTH] IN BLOOD BY AUTOMATED COUNT: 14 % (ref 12.3–15.4)
ERYTHROCYTE [SEDIMENTATION RATE] IN BLOOD: 2 MM/HR (ref 0–20)
GLOBULIN UR ELPH-MCNC: 2.8 GM/DL
GLUCOSE SERPL-MCNC: 159 MG/DL (ref 65–99)
HCT VFR BLD AUTO: 36.9 % (ref 37.5–51)
HGB BLD-MCNC: 12.7 G/DL (ref 13–17.7)
IMM GRANULOCYTES # BLD AUTO: 0.01 10*3/MM3 (ref 0–0.05)
IMM GRANULOCYTES NFR BLD AUTO: 0.1 % (ref 0–0.5)
LYMPHOCYTES # BLD AUTO: 2.4 10*3/MM3 (ref 0.7–3.1)
LYMPHOCYTES NFR BLD AUTO: 32.3 % (ref 19.6–45.3)
MCH RBC QN AUTO: 30.9 PG (ref 26.6–33)
MCHC RBC AUTO-ENTMCNC: 34.4 G/DL (ref 31.5–35.7)
MCV RBC AUTO: 89.8 FL (ref 79–97)
MONOCYTES # BLD AUTO: 0.46 10*3/MM3 (ref 0.1–0.9)
MONOCYTES NFR BLD AUTO: 6.2 % (ref 5–12)
NEUTROPHILS NFR BLD AUTO: 4.12 10*3/MM3 (ref 1.7–7)
NEUTROPHILS NFR BLD AUTO: 55.5 % (ref 42.7–76)
NRBC BLD AUTO-RTO: 0 /100 WBC (ref 0–0.2)
PLATELET # BLD AUTO: 245 10*3/MM3 (ref 140–450)
PMV BLD AUTO: 9.3 FL (ref 6–12)
POTASSIUM SERPL-SCNC: 4.7 MMOL/L (ref 3.5–5.2)
PROT SERPL-MCNC: 6.7 G/DL (ref 6–8.5)
RBC # BLD AUTO: 4.11 10*6/MM3 (ref 4.14–5.8)
SODIUM SERPL-SCNC: 138 MMOL/L (ref 136–145)
WBC NRBC COR # BLD: 7.43 10*3/MM3 (ref 3.4–10.8)

## 2023-09-06 PROCEDURE — 86140 C-REACTIVE PROTEIN: CPT

## 2023-09-06 PROCEDURE — 80053 COMPREHEN METABOLIC PANEL: CPT

## 2023-09-06 PROCEDURE — 36415 COLL VENOUS BLD VENIPUNCTURE: CPT

## 2023-09-06 PROCEDURE — 85652 RBC SED RATE AUTOMATED: CPT

## 2023-09-06 PROCEDURE — 85025 COMPLETE CBC W/AUTO DIFF WBC: CPT

## 2023-09-18 ENCOUNTER — PATIENT ROUNDING (BHMG ONLY) (OUTPATIENT)
Dept: PULMONOLOGY | Facility: CLINIC | Age: 68
End: 2023-09-18
Payer: COMMERCIAL

## 2023-09-18 ENCOUNTER — OFFICE VISIT (OUTPATIENT)
Dept: PULMONOLOGY | Facility: CLINIC | Age: 68
End: 2023-09-18
Payer: COMMERCIAL

## 2023-09-18 ENCOUNTER — OFFICE VISIT (OUTPATIENT)
Dept: PSYCHIATRY | Facility: CLINIC | Age: 68
End: 2023-09-18
Payer: COMMERCIAL

## 2023-09-18 VITALS
OXYGEN SATURATION: 94 % | WEIGHT: 159 LBS | HEIGHT: 65 IN | SYSTOLIC BLOOD PRESSURE: 132 MMHG | BODY MASS INDEX: 26.49 KG/M2 | HEART RATE: 95 BPM | RESPIRATION RATE: 18 BRPM | DIASTOLIC BLOOD PRESSURE: 64 MMHG

## 2023-09-18 VITALS
WEIGHT: 162 LBS | HEIGHT: 65 IN | HEART RATE: 88 BPM | DIASTOLIC BLOOD PRESSURE: 70 MMHG | BODY MASS INDEX: 26.99 KG/M2 | SYSTOLIC BLOOD PRESSURE: 118 MMHG

## 2023-09-18 DIAGNOSIS — F41.9 ANXIETY: Primary | ICD-10-CM

## 2023-09-18 DIAGNOSIS — G47.19 DAYTIME HYPERSOMNOLENCE: Primary | ICD-10-CM

## 2023-09-18 DIAGNOSIS — F41.0 PANIC ATTACKS: ICD-10-CM

## 2023-09-18 DIAGNOSIS — R06.02 SHORTNESS OF BREATH: ICD-10-CM

## 2023-09-18 PROCEDURE — 99204 OFFICE O/P NEW MOD 45 MIN: CPT | Performed by: INTERNAL MEDICINE

## 2023-09-18 RX ORDER — FLUOXETINE 10 MG/1
CAPSULE ORAL
Qty: 45 CAPSULE | Refills: 0 | Status: SHIPPED | OUTPATIENT
Start: 2023-09-18 | End: 2023-10-18

## 2023-09-18 RX ORDER — HYDROXYZINE PAMOATE 25 MG/1
25 CAPSULE ORAL 3 TIMES DAILY PRN
Qty: 90 CAPSULE | Refills: 1 | Status: SHIPPED | OUTPATIENT
Start: 2023-09-18

## 2023-09-18 RX ORDER — ESCITALOPRAM OXALATE 5 MG/1
5 TABLET ORAL 2 TIMES DAILY
COMMUNITY
End: 2023-09-18 | Stop reason: ALTCHOICE

## 2023-09-18 NOTE — PROGRESS NOTES
New Pulmonary Patient Office Visit      Patient Name: Pascual Kaur    Referring Physician: Humberto Kumari MD    Chief Complaint:    Chief Complaint   Patient presents with    Sleeping Problem    Consult       History of Present Illness: Pascual Kaur is a 68 y.o. male who is here today to establish care with Pulmonary for suspected sleep apnea.    RA diagnosed about 2-3 years ago.  Was on Humira and recently switched to methotrexate due to renal dysfunction.  Feels like    He was told he might have emphysema in 2010 and was given inhalers, but never consistently use them.  Used albuterol when he was doing physical therapy, but then soon afterwards diagnosed with lung cancer and required wedge resection for Lung cancer and chemotherapy, but had difficulty tolerating it, opted to stop chemo due to this.     It is unclear if he actually ever had PFTs to verify obstructive lung disease diagnosis.  However, he and his wife both agree that once his lung cancer is treated his symptoms significantly improved.    He now has difficulty with chronic fatigue, daytime hypersomnolence, difficulty with memory and there is concern over possible sleep apnea.  He has never had a sleep study.  He goes to sleep about 10 PM and wakes up about 0520 for work and works as a .  Mainly sedentary and desk work.   His wife states that when he will sit down and watch TV in the evenings he nods off easily.  He will sometimes awaken with choking/snoring.  He does have nonrestorative sleep and occasional morning headaches.    He is accompanied to clinic today by his wife, Yandy, who works in surgery.    Subjective      Review of Systems:   Review of Systems   Constitutional:  Positive for fatigue.   Neurological:  Positive for memory problem.   Psychiatric/Behavioral:  Positive for sleep disturbance.      Past Medical History:   Past Medical History:   Diagnosis Date    Bronchitis     Cataract      Cataract surgery 2 years ago in both eyes    COPD (chronic obstructive pulmonary disease)     Diabetes mellitus type I     Elevated liver enzymes     Chronic elevated liver enzymes with history of elevated alkaline phosphate as well as ALT and AST.    Fatigue     Generalized fatigue, nondescript, not new, not better with exercise, not worse with exercise, not better with rest    History of chemotherapy     Received 1 course of adjuvant carboplatin and Taxotere. Then we started carbo.Taxol off a 3 on, 1 off weekly regimen but he did not tolerate either of those and subsequentlly refused further adjuvant therapy. On follow-up scanning since that time.    History of CT scan of chest 06/19/2014    CT of chest noncontrast showed no evidence of recurrence. There was stable diffuse scarring in the right middle and upper lung consistent with postoperative changes and stable obstructive emphysematous changes.    History of MRI     MRI of brain negative    Impaired functional mobility, balance, gait, and endurance     Kidney stone     Lung cancer 05/2011    Stage IIA, T2N1, non-small cell lung cancer, status post wedge resection of a 3 cm primary, level 10 node positive, preop PET negative, and MRI of brain negative lung cancer. Ineligible for our MORAB trial due to the wedge resection.  Diagnosis was in May 2011. - No recurrence.    Pneumonia     As a small child    Sinusitis     Stress     regarding his occupation    Type 2 diabetes mellitus with hyperglycemia, with long-term current use of insulin 02/04/2021    Visual impairment     Diabetic retinopathy       Past Surgical History:   Past Surgical History:   Procedure Laterality Date    CATARACT EXTRACTION  03/08/2021    CATARACT EXTRACTION  04/06/2021    CHOLECYSTECTOMY      EYE SURGERY Right     KIDNEY STONE SURGERY      LUNG CANCER SURGERY      TONSILLECTOMY      VITRECTOMY Right 2023    Right eye       Family History:   Family History   Problem Relation Age of Onset  "   Diabetes Mother     Hashimoto's thyroiditis Sister        Social History:   Social History     Socioeconomic History    Marital status:    Tobacco Use    Smoking status: Former     Packs/day: 3.00     Years: 37.00     Pack years: 111.00     Types: Cigarettes     Quit date: 2010     Years since quittin.7    Smokeless tobacco: Never    Tobacco comments:     \"He denies smoking.\"   Vaping Use    Vaping Use: Never used   Substance and Sexual Activity    Alcohol use: No    Drug use: No    Sexual activity: Defer     Comment: .       Medications:     Current Outpatient Medications:     albuterol sulfate  (90 Base) MCG/ACT inhaler, Inhale 2 puffs by mouth Every 4 (Four) Hours As Needed for Wheezing., Disp: 8.5 g, Rfl: 2    aspirin 81 MG chewable tablet, Chew 1 tablet Daily., Disp: , Rfl:     atorvastatin (LIPITOR) 20 MG tablet, Take 1 tablet by mouth Daily., Disp: 90 tablet, Rfl: 0    Dulaglutide (Trulicity) 0.75 MG/0.5ML solution pen-injector, Inject 0.75 mg under the skin into the appropriate area as directed 1 (One) Time Per Week., Disp: 2 mL, Rfl: 3    FLUoxetine (PROzac) 10 MG capsule, Take 1 capsule by mouth Daily for 15 days, THEN 2 capsules Daily for 15 days., Disp: 45 capsule, Rfl: 0    fluticasone (Flonase) 50 MCG/ACT nasal spray, Instill 2 sprays into each nostril daily., Disp: 16 g, Rfl: 5    folic acid (FOLVITE) 1 MG tablet, Take 1 tablet by mouth Daily., Disp: 30 tablet, Rfl: 6    glucose blood (Prodigy No Coding Blood Gluc) test strip, Test glucose three times daily for diabetes (Patient taking differently: Test glucose three times daily for diabetes), Disp: 300 each, Rfl: 3    glucose blood test strip, Use as directed 3-4 times a day (Patient taking differently: Use as directed 3-4 times a day), Disp: 100 each, Rfl: 5    Homeopathic Products (ALLERGY MEDICINE PO), Take 1 tablet by mouth Daily., Disp: , Rfl:     hydrOXYzine pamoate (VISTARIL) 25 MG capsule, Take 1 capsule by " "mouth 3 (Three) Times a Day As Needed for Anxiety., Disp: 90 capsule, Rfl: 1    insulin aspart (NovoLOG FlexPen) 100 UNIT/ML solution pen-injector sc pen, Inject as directed by correctional scale, MDD 20 units (Patient taking differently: 60 Units 1 (One) Time. Inject as directed by correctional scale, MDD 60 units dailly total), Disp: 15 mL, Rfl: 5    insulin detemir (Levemir FlexTouch) 100 UNIT/ML injection, Inject 30 Units under the skin into the appropriate area as directed 2 (Two) Times a Day. (Patient taking differently: Inject 30 Units under the skin into the appropriate area as directed 2 (Two) Times a Day. AM and Dinner), Disp: 30 mL, Rfl: 5    methotrexate 2.5 MG tablet, Take 10 tablets by mouth 1 (One) Time Per Week., Disp: 40 tablet, Rfl: 4    omeprazole (priLOSEC) 40 MG capsule, Take 1 capsule by mouth Daily., Disp: 90 capsule, Rfl: 0    predniSONE (DELTASONE) 10 MG tablet, Take 1 Tablet by mouth Daily for 2 - 5 days as needed for a flare up, may repeat once a week, Disp: 30 tablet, Rfl: 3    Humira Pen 40 MG/0.4ML Pen-injector Kit, 40 mg. TAKE EVERY OTHER Monday. (Patient not taking: Reported on 9/18/2023), Disp: , Rfl:     Allergies:   Allergies   Allergen Reactions    Iodine GI Intolerance    Other GI Intolerance and Other (See Comments)     * IVP Dye    * IVP Dye * Nausea/Vomiting       Objective     Physical Exam:  Vital Signs:   Vitals:    09/18/23 1055   BP: 132/64   Pulse: 95   Resp: 18   SpO2: 94%   Weight: 72.1 kg (159 lb)   Height: 165.1 cm (65\")       Physical Exam  Vitals and nursing note reviewed.   Constitutional:       General: He is not in acute distress.     Appearance: He is well-developed. He is not ill-appearing.      Comments: Appears stated age   HENT:      Head: Normocephalic and atraumatic.      Right Ear: Tympanic membrane and external ear normal.      Left Ear: Tympanic membrane and external ear normal.      Nose: Nose normal. No congestion or rhinorrhea.      Mouth/Throat:    "   Mouth: Mucous membranes are moist.      Pharynx: Oropharynx is clear. No oropharyngeal exudate or posterior oropharyngeal erythema.   Eyes:      General: No scleral icterus.        Right eye: No discharge.         Left eye: No discharge.      Extraocular Movements: Extraocular movements intact.      Conjunctiva/sclera: Conjunctivae normal.   Neck:      Trachea: No tracheal deviation.   Cardiovascular:      Rate and Rhythm: Normal rate and regular rhythm.      Heart sounds: No murmur heard.  Pulmonary:      Effort: No respiratory distress.      Breath sounds: No wheezing or rhonchi.   Abdominal:      General: There is no distension.      Palpations: Abdomen is soft.   Musculoskeletal:         General: No tenderness. Normal range of motion.      Cervical back: Normal range of motion and neck supple.      Right lower leg: No edema.      Left lower leg: No edema.   Skin:     General: Skin is warm and dry.      Findings: No rash.   Neurological:      Mental Status: He is alert and oriented to person, place, and time.      Coordination: Coordination normal.      Gait: Gait normal.   Psychiatric:         Mood and Affect: Mood normal.         Judgment: Judgment normal.      Comments: Does seem a little slow to answer questions, but does answer them appropriately     Mallampati Score: III (soft and hard palate and base of uvula visible)    Results Review:   Labs: Reviewed.  Lab Results   Component Value Date    WBC 7.43 09/06/2023    HGB 12.7 (L) 09/06/2023    HCT 36.9 (L) 09/06/2023    MCV 89.8 09/06/2023     09/06/2023   Absolute eosinophils 0.37, eosinophils 5%    Lab Results   Component Value Date    GLUCOSE 159 (H) 09/06/2023    BUN 26 (H) 09/06/2023    CREATININE 1.34 (H) 09/06/2023    EGFRRESULT 42 (L) 07/28/2023    EGFR 58.1 (L) 09/06/2023    BCR 19.4 09/06/2023    K 4.7 09/06/2023    CO2 28.9 09/06/2023    CALCIUM 9.1 09/06/2023    PROTENTOTREF 7.0 07/28/2023    ALBUMIN 3.9 09/06/2023    BILITOT 0.3  09/06/2023    AST 34 09/06/2023    ALT 57 (H) 09/06/2023     Lab Results   Component Value Date    TSH 1.380 07/28/2023         No results found for: CBCDIF, CMP     Micro: As of September 18, 2023   No results found for: RESPCX  No results found for: BLOODCX  Lab Results   Component Value Date    URINECX Final report 11/06/2020     No results found for: MRSACX  No results found for: MRSAPCR  No results found for: URCX  No components found for: LOWRESPCF  No results found for: THROATCX  No results found for: CULTURES  No components found for: STREPBCX  No results found for: STREPPNEUAG  No results found for: LEGIONELLA  No results found for: MYCOPLASCX  No results found for: GCCX  No results found for: WOUNDCX  No results found for: BODYFLDCX    ABG: No results found for: PHART, NEY9CDA, PO2ART, HGBBG, M8NNJUUH, CFIO2, FCOHB, CARBOXYHGB, FMETHB    Echo:     Radiology Scans:   Images reviewed personally.     March 2023 chest x-ray  CLINICAL INDICATION:    Weak/Dizzy/AMS triage protocol     EXAMINATION TECHNIQUE:   XR CHEST 1 VW-     COMPARISON:  Radiographs 05/03/2022.     FINDINGS:  Multiple surgical clips in the right mid chest with the pulmonary  architectural distortion in the right hilar region, likely postoperative  sequela. Cardiac and mediastinal contours are within normal limits  except aortic atherosclerosis. Emphysema. No dense consolidation. No  pneumothorax or pleural effusion. Postsurgical changes in the right mid  thoracic cage.     IMPRESSION:  No acute cardiopulmonary findings. Chronic changes as above.     Images personally reviewed, interpreted and dictated by HANNA Gore.                This report was signed and finalized on 3/16/2023 2:18 PM by HANNA Gore.      2015 EXAMINATION: RC CHEST WO CONTRAST-      INDICATION: Lung cancer.       TECHNIQUE: Multiple axial CT imaging was obtained of the chest without  the administration of intravenous contrast.     The radiation dose  reduction device was turned on for each scan per the  ALARA (As Low as Reasonably Achievable) protocol.     COMPARISON: 6/19/2014.     FINDINGS: The thyroid is homogeneous in appearance. No mediastinal mass  or lymphadenopathy. Vascular calcifications are identified. Coronary  artery calcification is seen. No pericardial effusion. The cardiac  chambers are within normal limits. The lung parenchyma reveals some  scarring identified within the right upper lobe. Post surgical changes  are identified with staple lines. There is no evidence of recurrence.  There is no parenchymal consolidation, pulmonary mass or nodule. Minimal  scarring at the right lung base. No pleural effusion or pneumothorax.   Degenerative change is seen within the spine. The chest is essentially  stable and unchanged when compared to the prior study. Visualized  portions the upper abdomen are grossly unremarkable in appearance.     IMPRESSION- Stable scarring within the right upper lung field. No  evidence of recurrence or metastatic disease.     DICTATED:     06/25/2015  EDITED:          06/25/2015             Reading Radiologist- JU JAMIL       Releasing Radiologist- JU JAMIL       Released Date Time- 06/26/15 1344       - NORMA  PFT IMPRESSION:   None available for review    Assessment / Plan      Assessment/Plan:    1. Daytime hypersomnolence  Patient with multiple symptoms suggestive of sleep apnea and we will go ahead and check sleep study.  Does not appear to have significant obstructive lung disease or heart failure that would preclude home sleep study.  If sleep study indicates need for worse CPAP we will go ahead and order and follow-up for compliance at next clinic visit.    - Home Sleep Study; Future    2. Shortness of breath  We will check PFTs at next clinic visit.  We will also try to get results of recent Holter monitor  His wife admits he also has some difficulty with anxiety which may be  contributing to the symptoms as well.    - Complete PFT - Pre & Post Bronchodilator; Future       Follow Up:   Return in about 2 months (around 11/18/2023).    Samantha Cuevas MD  Pulmonary/Critical Care Physician   [facility]      Please note that portions of this note may have been completed with a voice recognition program. Efforts were made to edit the dictations, but occasionally words are mistranscribed.

## 2023-09-18 NOTE — PROGRESS NOTES
Subjective   Pascual Kaur is a 68 y.o. male who presents today for initial evaluation     Chief Complaint:  anxiety    History of Present Illness:   History of Present Illness  Pascual Kaur presents to [facility] for initial evaluation. He is accompanied to appointment today by his wife and remains present during visit per his request. Reports that he was referred here by PCP, Dr. Kumari for worsening symptoms associated with anxiety despite increase in medication.  Currently taking Lexapro 10 mg daily. Verbalizes that anxiety is often intermittent and can be increased with or without known trigger. Reports known triggers being certain people, crowds, stressful situations or happy/exciting situations. Says that anxiety can be triggered, then increase in severity prompting panic episode. Symptoms associated with panic episode include chest tightness/pressure, SOA and intense overwhelming anxiety.  Reports that he frequently has difficulty calming down once episode occurs.  Panic attacks occur multiple times per day. He does report having intermittent times without anxiety.  Rates anxiety 8-9/10 on a 0-10 scale with 10 being the worst.  He does admit to feeling down on occasion, but otherwise denies any depressive type symptoms.  Verbalizes that he has been told that he has no filter and would sometimes say things without consideration of how comments made others feel.  Sleep varies, but feels overall adequate amount is obtained.  Reports appetite is good.  Adamantly denies any SI/HI or AVH.  PHQ-9 total score: 9, ANDREW-7 total score: 5.    Past Psychiatric History: Reports that symptoms of anxiety initially began after leaving  service in 1992.  Says that he was also having issues with his ex-wife during that time that increased symptoms of anxiety.  Says that he was never on medications until discussed symptoms with PCP fairly recently.  Denies any  past inpatient hospitalizations, denies any history of suicide attempts, self-harming behaviors, SI/HI or AVH.    Previous Psych Meds: Denies any previous psychiatric medications    Past Medical/Developmental History: T2 DM diagnosed in 2000, diagnosed with lung cancer in 2010, had right wedge removal, had chemo for a short time but did not complete treatment.  Also has diabetic retinopathy and currently receives injections.    Family Psychiatric History: Denies any known family psychiatric history.    Social History: Lives with wife of 16 years and 2 children (ages 14 and 18), has total of 9 children.  Currently works 12-hour shifts as a  Thursday, Friday, Saturday and every other Sunday. Was previously a  at the [employer].     The following portions of the patient's history were reviewed and updated as appropriate: allergies, current medications, past family history, past medical history, past social history, past surgical history and problem list.    Past Medical History:   Diagnosis Date    Bronchitis     Cataract     Cataract surgery 2 years ago in both eyes    COPD (chronic obstructive pulmonary disease)     Diabetes mellitus type I     Elevated liver enzymes     Chronic elevated liver enzymes with history of elevated alkaline phosphate as well as ALT and AST.    Fatigue     Generalized fatigue, nondescript, not new, not better with exercise, not worse with exercise, not better with rest    History of chemotherapy     Received 1 course of adjuvant carboplatin and Taxotere. Then we started carbo.Taxol off a 3 on, 1 off weekly regimen but he did not tolerate either of those and subsequentlly refused further adjuvant therapy. On follow-up scanning since that time.    History of CT scan of chest 06/19/2014    CT of chest noncontrast showed no evidence of recurrence. There was stable diffuse scarring in the right middle and upper lung consistent with postoperative changes and stable  "obstructive emphysematous changes.    History of MRI     MRI of brain negative    Impaired functional mobility, balance, gait, and endurance     Kidney stone     Lung cancer 2011    Stage IIA, T2N1, non-small cell lung cancer, status post wedge resection of a 3 cm primary, level 10 node positive, preop PET negative, and MRI of brain negative lung cancer. Ineligible for our MORAB trial due to the wedge resection.  Diagnosis was in May 2011. - No recurrence.    Pneumonia     As a small child    Sinusitis     Stress     regarding his occupation    Type 2 diabetes mellitus with hyperglycemia, with long-term current use of insulin 2021    Visual impairment     Diabetic retinopathy     Social History     Socioeconomic History    Marital status:    Tobacco Use    Smoking status: Former     Packs/day: 3.00     Years: 37.00     Pack years: 111.00     Types: Cigarettes     Quit date: 2010     Years since quittin.7    Smokeless tobacco: Never    Tobacco comments:     \"He denies smoking.\"   Vaping Use    Vaping Use: Never used   Substance and Sexual Activity    Alcohol use: No    Drug use: No    Sexual activity: Defer     Comment: .     Family History   Problem Relation Age of Onset    Diabetes Mother     Hashimoto's thyroiditis Sister      Past Surgical History:   Procedure Laterality Date    CATARACT EXTRACTION  2021    CATARACT EXTRACTION  2021    CHOLECYSTECTOMY      EYE SURGERY Right     KIDNEY STONE SURGERY      LUNG CANCER SURGERY      TONSILLECTOMY      VITRECTOMY Right     Right eye     Patient Active Problem List   Diagnosis    Chronic obstructive pulmonary disease    Malignant neoplasm of upper lobe of right lung    Type 2 diabetes mellitus with hyperglycemia, with long-term current use of insulin    Rheumatoid arthritis involving multiple sites with positive rheumatoid factor    Reactive depression    Dizziness     Allergies   Allergen Reactions    Iodine GI " Intolerance    Other GI Intolerance and Other (See Comments)     * IVP Dye    * IVP Dye * Nausea/Vomiting        Current Medications:   Current Outpatient Medications   Medication Sig Dispense Refill    albuterol sulfate  (90 Base) MCG/ACT inhaler Inhale 2 puffs by mouth Every 4 (Four) Hours As Needed for Wheezing. 8.5 g 2    aspirin 81 MG chewable tablet Chew 1 tablet Daily.      atorvastatin (LIPITOR) 20 MG tablet Take 1 tablet by mouth Daily. 90 tablet 0    Dulaglutide (Trulicity) 0.75 MG/0.5ML solution pen-injector Inject 0.75 mg under the skin into the appropriate area as directed 1 (One) Time Per Week. 2 mL 3    fluticasone (Flonase) 50 MCG/ACT nasal spray Instill 2 sprays into each nostril daily. 16 g 5    folic acid (FOLVITE) 1 MG tablet Take 1 tablet by mouth Daily. 30 tablet 6    glucose blood (Prodigy No Coding Blood Gluc) test strip Test glucose three times daily for diabetes (Patient taking differently: Test glucose three times daily for diabetes) 300 each 3    glucose blood test strip Use as directed 3-4 times a day (Patient taking differently: Use as directed 3-4 times a day) 100 each 5    Homeopathic Products (ALLERGY MEDICINE PO) Take 1 tablet by mouth Daily.      insulin aspart (NovoLOG FlexPen) 100 UNIT/ML solution pen-injector sc pen Inject as directed by correctional scale, MDD 20 units (Patient taking differently: 60 Units 1 (One) Time. Inject as directed by correctional scale, MDD 60 units dailly total) 15 mL 5    insulin detemir (Levemir FlexTouch) 100 UNIT/ML injection Inject 30 Units under the skin into the appropriate area as directed 2 (Two) Times a Day. (Patient taking differently: Inject 30 Units under the skin into the appropriate area as directed 2 (Two) Times a Day. AM and Dinner) 30 mL 5    methotrexate 2.5 MG tablet Take 10 tablets by mouth 1 (One) Time Per Week. 40 tablet 4    omeprazole (priLOSEC) 40 MG capsule Take 1 capsule by mouth Daily. 90 capsule 0    predniSONE  "(DELTASONE) 10 MG tablet Take 1 Tablet by mouth Daily for 2 - 5 days as needed for a flare up, may repeat once a week 30 tablet 3    FLUoxetine (PROzac) 10 MG capsule Take 1 capsule by mouth Daily for 15 days, THEN 2 capsules Daily for 15 days. 45 capsule 0    Humira Pen 40 MG/0.4ML Pen-injector Kit 40 mg. TAKE EVERY OTHER Monday. (Patient not taking: Reported on 9/18/2023)      hydrOXYzine pamoate (VISTARIL) 25 MG capsule Take 1 capsule by mouth 3 (Three) Times a Day As Needed for Anxiety. 90 capsule 1     No current facility-administered medications for this visit.     Review of Systems   Constitutional:  Negative for activity change, appetite change, unexpected weight gain and unexpected weight loss.   Respiratory:  Negative for shortness of breath.    Cardiovascular:  Negative for chest pain.   Psychiatric/Behavioral:  Positive for sleep disturbance. Negative for hallucinations, suicidal ideas and depressed mood. The patient is nervous/anxious.      Physical Exam  Vitals reviewed.   Constitutional:       General: He is not in acute distress.     Appearance: Normal appearance.   Neurological:      Mental Status: He is alert.      Gait: Gait normal.       Vitals:   Blood pressure 118/70, pulse 88, height 165.1 cm (65\"), weight 73.5 kg (162 lb).    Mental Status Exam:   Hygiene:   good  Cooperation:  Cooperative  Eye Contact:  Fair  Psychomotor Behavior:  Appropriate  Affect:  Full range and Appropriate  Mood: normal  Hopelessness: Denies  Speech:  Normal  Thought Process:  Goal directed and Linear  Thought Content:  Mood congruent  Suicidal:  None  Homicidal:  None  Hallucinations:  None  Delusion:  None  Memory:  Intact  Orientation:  Person, Place, Time, and Situation  Reliability:  good  Insight:  Good  Judgement:  Good  Impulse Control:  Good    Lab Results:   Lab on 09/06/2023   Component Date Value Ref Range Status    Glucose 09/06/2023 159 (H)  65 - 99 mg/dL Final    BUN 09/06/2023 26 (H)  8 - 23 mg/dL " Final    Creatinine 09/06/2023 1.34 (H)  0.76 - 1.27 mg/dL Final    Sodium 09/06/2023 138  136 - 145 mmol/L Final    Potassium 09/06/2023 4.7  3.5 - 5.2 mmol/L Final    Chloride 09/06/2023 99  98 - 107 mmol/L Final    CO2 09/06/2023 28.9  22.0 - 29.0 mmol/L Final    Calcium 09/06/2023 9.1  8.6 - 10.5 mg/dL Final    Total Protein 09/06/2023 6.7  6.0 - 8.5 g/dL Final    Albumin 09/06/2023 3.9  3.5 - 5.2 g/dL Final    ALT (SGPT) 09/06/2023 57 (H)  1 - 41 U/L Final    AST (SGOT) 09/06/2023 34  1 - 40 U/L Final    Alkaline Phosphatase 09/06/2023 207 (H)  39 - 117 U/L Final    Total Bilirubin 09/06/2023 0.3  0.0 - 1.2 mg/dL Final    Globulin 09/06/2023 2.8  gm/dL Final    A/G Ratio 09/06/2023 1.4  g/dL Final    BUN/Creatinine Ratio 09/06/2023 19.4  7.0 - 25.0 Final    Anion Gap 09/06/2023 10.1  5.0 - 15.0 mmol/L Final    eGFR 09/06/2023 58.1 (L)  >60.0 mL/min/1.73 Final    C-Reactive Protein 09/06/2023 <0.30  0.00 - 0.50 mg/dL Final    Sed Rate 09/06/2023 2  0 - 20 mm/hr Final    WBC 09/06/2023 7.43  3.40 - 10.80 10*3/mm3 Final    RBC 09/06/2023 4.11 (L)  4.14 - 5.80 10*6/mm3 Final    Hemoglobin 09/06/2023 12.7 (L)  13.0 - 17.7 g/dL Final    Hematocrit 09/06/2023 36.9 (L)  37.5 - 51.0 % Final    MCV 09/06/2023 89.8  79.0 - 97.0 fL Final    MCH 09/06/2023 30.9  26.6 - 33.0 pg Final    MCHC 09/06/2023 34.4  31.5 - 35.7 g/dL Final    RDW 09/06/2023 14.0  12.3 - 15.4 % Final    RDW-SD 09/06/2023 45.1  37.0 - 54.0 fl Final    MPV 09/06/2023 9.3  6.0 - 12.0 fL Final    Platelets 09/06/2023 245  140 - 450 10*3/mm3 Final    Neutrophil % 09/06/2023 55.5  42.7 - 76.0 % Final    Lymphocyte % 09/06/2023 32.3  19.6 - 45.3 % Final    Monocyte % 09/06/2023 6.2  5.0 - 12.0 % Final    Eosinophil % 09/06/2023 5.0  0.3 - 6.2 % Final    Basophil % 09/06/2023 0.9  0.0 - 1.5 % Final    Immature Grans % 09/06/2023 0.1  0.0 - 0.5 % Final    Neutrophils, Absolute 09/06/2023 4.12  1.70 - 7.00 10*3/mm3 Final    Lymphocytes, Absolute 09/06/2023 2.40   0.70 - 3.10 10*3/mm3 Final    Monocytes, Absolute 09/06/2023 0.46  0.10 - 0.90 10*3/mm3 Final    Eosinophils, Absolute 09/06/2023 0.37  0.00 - 0.40 10*3/mm3 Final    Basophils, Absolute 09/06/2023 0.07  0.00 - 0.20 10*3/mm3 Final    Immature Grans, Absolute 09/06/2023 0.01  0.00 - 0.05 10*3/mm3 Final    nRBC 09/06/2023 0.0  0.0 - 0.2 /100 WBC Final   Lab on 08/09/2023   Component Date Value Ref Range Status    Total Cholesterol 08/09/2023 127  0 - 200 mg/dL Final    Triglycerides 08/09/2023 190 (H)  0 - 150 mg/dL Final    HDL Cholesterol 08/09/2023 32 (L)  40 - 60 mg/dL Final    LDL Cholesterol  08/09/2023 63  0 - 100 mg/dL Final    VLDL Cholesterol 08/09/2023 32  5 - 40 mg/dL Final    LDL/HDL Ratio 08/09/2023 1.78   Final    Microalbumin/Creatinine Ratio 08/09/2023 782.7  mg/g Final    Creatinine, Urine 08/09/2023 110.0  mg/dL Final    Microalbumin, Urine 08/09/2023 86.1  mg/dL Final   Office Visit on 08/09/2023   Component Date Value Ref Range Status    Glucose 08/09/2023 277 (A)  70 - 130 mg/dL Final    Lot Number 08/09/2023 [id number]   Final    Expiration Date 08/09/2023 1-28-24   Final    Hemoglobin A1C 08/09/2023 6.7  % Final    Lot Number 08/09/2023 [id number]   Final    Expiration Date 08/09/2023 3-22-25   Final   Orders Only on 08/01/2023   Component Date Value Ref Range Status    Glucose 08/09/2023 215 (H)  65 - 99 mg/dL Final    BUN 08/09/2023 21  8 - 23 mg/dL Final    Creatinine 08/09/2023 1.12  0.76 - 1.27 mg/dL Final    Sodium 08/09/2023 138  136 - 145 mmol/L Final    Potassium 08/09/2023 4.5  3.5 - 5.2 mmol/L Final    Chloride 08/09/2023 101  98 - 107 mmol/L Final    CO2 08/09/2023 27.0  22.0 - 29.0 mmol/L Final    Calcium 08/09/2023 9.4  8.6 - 10.5 mg/dL Final    Total Protein 08/09/2023 6.8  6.0 - 8.5 g/dL Final    Albumin 08/09/2023 4.2  3.5 - 5.2 g/dL Final    ALT (SGPT) 08/09/2023 57 (H)  1 - 41 U/L Final    AST (SGOT) 08/09/2023 38  1 - 40 U/L Final    Alkaline Phosphatase 08/09/2023 196 (H)   39 - 117 U/L Final    Total Bilirubin 08/09/2023 0.3  0.0 - 1.2 mg/dL Final    Globulin 08/09/2023 2.6  gm/dL Final    A/G Ratio 08/09/2023 1.6  g/dL Final    BUN/Creatinine Ratio 08/09/2023 18.8  7.0 - 25.0 Final    Anion Gap 08/09/2023 10.0  5.0 - 15.0 mmol/L Final    eGFR 08/09/2023 72.0  >60.0 mL/min/1.73 Final   Office Visit on 07/28/2023   Component Date Value Ref Range Status    Testosterone, Total 07/28/2023 357  264 - 916 ng/dL Final    Comment: Adult male reference interval is based on a population of  healthy nonobese males (BMI <30) between 19 and 39 years old.  Angelica et.al. JCEM 2017,102;4917-5143. PMID: [id number].      Glucose 07/28/2023 63 (L)  70 - 99 mg/dL Final    BUN 07/28/2023 28 (H)  8 - 27 mg/dL Final    Creatinine 07/28/2023 1.76 (H)  0.76 - 1.27 mg/dL Final    EGFR Result 07/28/2023 42 (L)  >59 mL/min/1.73 Final    BUN/Creatinine Ratio 07/28/2023 16  10 - 24 Final    Sodium 07/28/2023 143  134 - 144 mmol/L Final    Potassium 07/28/2023 4.4  3.5 - 5.2 mmol/L Final    Chloride 07/28/2023 104  96 - 106 mmol/L Final    Total CO2 07/28/2023 24  20 - 29 mmol/L Final    Calcium 07/28/2023 9.5  8.6 - 10.2 mg/dL Final    Total Protein 07/28/2023 7.0  6.0 - 8.5 g/dL Final    Albumin 07/28/2023 4.5  3.9 - 4.9 g/dL Final    Globulin 07/28/2023 2.5  1.5 - 4.5 g/dL Final    A/G Ratio 07/28/2023 1.8  1.2 - 2.2 Final    Total Bilirubin 07/28/2023 0.5  0.0 - 1.2 mg/dL Final    Alkaline Phosphatase 07/28/2023 149 (H)  44 - 121 IU/L Final    AST (SGOT) 07/28/2023 48 (H)  0 - 40 IU/L Final    ALT (SGPT) 07/28/2023 75 (H)  0 - 44 IU/L Final    WBC 07/28/2023 7.9  3.4 - 10.8 x10E3/uL Final    RBC 07/28/2023 4.28  4.14 - 5.80 x10E6/uL Final    Hemoglobin 07/28/2023 12.8 (L)  13.0 - 17.7 g/dL Final    Hematocrit 07/28/2023 38.7  37.5 - 51.0 % Final    MCV 07/28/2023 90  79 - 97 fL Final    MCH 07/28/2023 29.9  26.6 - 33.0 pg Final    MCHC 07/28/2023 33.1  31.5 - 35.7 g/dL Final    RDW 07/28/2023 15.7 (H)  11.6 -  15.4 % Final    Platelets 07/28/2023 200  150 - 450 x10E3/uL Final    TSH 07/28/2023 1.380  0.450 - 4.500 uIU/mL Final   Office Visit on 04/10/2023   Component Date Value Ref Range Status    SARS Antigen 04/10/2023 Not Detected  Not Detected, Presumptive Negative Final    Influenza A Antigen SUKHJINDER 04/10/2023 Not Detected  Not Detected Final    Influenza B Antigen SUKHJINDER 04/10/2023 Not Detected  Not Detected Final    Internal Control 04/10/2023 Passed  Passed Final    Lot Number 04/10/2023 [id number]   Final    Expiration Date 04/10/2023 03/16/2024   Final    Rapid Strep A Screen 04/10/2023 Negative  Negative, VALID, INVALID, Not Performed Final    Internal Control 04/10/2023 Passed  Passed Final    Lot Number 04/10/2023 [id number]   Final    Expiration Date 04/10/2023 03/16/2024   Final   Office Visit on 04/04/2023   Component Date Value Ref Range Status    Glucose 04/04/2023 254 (A)  70 - 130 mg/dL Final    Lot Number 04/04/2023 [id number]   Final    Expiration Date 04/04/2023 10/13/2023   Final   Lab on 03/24/2023   Component Date Value Ref Range Status    Glucose 03/24/2023 121 (H)  65 - 99 mg/dL Final    BUN 03/24/2023 21  8 - 23 mg/dL Final    Creatinine 03/24/2023 1.09  0.76 - 1.27 mg/dL Final    Sodium 03/24/2023 137  136 - 145 mmol/L Final    Potassium 03/24/2023 4.4  3.5 - 5.2 mmol/L Final    Chloride 03/24/2023 104  98 - 107 mmol/L Final    CO2 03/24/2023 24.0  22.0 - 29.0 mmol/L Final    Calcium 03/24/2023 9.2  8.6 - 10.5 mg/dL Final    Total Protein 03/24/2023 7.1  6.0 - 8.5 g/dL Final    Albumin 03/24/2023 4.1  3.5 - 5.2 g/dL Final    ALT (SGPT) 03/24/2023 24  1 - 41 U/L Final    AST (SGOT) 03/24/2023 27  1 - 40 U/L Final    Alkaline Phosphatase 03/24/2023 182 (H)  39 - 117 U/L Final    Total Bilirubin 03/24/2023 0.3  0.0 - 1.2 mg/dL Final    Globulin 03/24/2023 3.0  gm/dL Final    A/G Ratio 03/24/2023 1.4  g/dL Final    BUN/Creatinine Ratio 03/24/2023 19.3  7.0 - 25.0 Final    Anion Gap 03/24/2023 9.0  5.0 -  15.0 mmol/L Final    eGFR 03/24/2023 74.9  >60.0 mL/min/1.73 Final     EKG Results:  No orders to display     Assessment & Plan   Problems Addressed this Visit    None  Visit Diagnoses       Anxiety    -  Primary    Relevant Medications    FLUoxetine (PROzac) 10 MG capsule    hydrOXYzine pamoate (VISTARIL) 25 MG capsule    Panic attacks        Relevant Medications    FLUoxetine (PROzac) 10 MG capsule    hydrOXYzine pamoate (VISTARIL) 25 MG capsule          Diagnoses         Codes Comments    Anxiety    -  Primary ICD-10-CM: F41.9  ICD-9-CM: 300.00     Panic attacks     ICD-10-CM: F41.0  ICD-9-CM: 300.01           Visit Diagnoses:    ICD-10-CM ICD-9-CM   1. Anxiety  F41.9 300.00   2. Panic attacks  F41.0 300.01     Pascual presents today for initial visit and is accompanied by his wife per his request. He voices anxiety that increases in severity with and without known triggers. He says that triggers can be related to stressful situations or enjoyable/exciting situations. Has been on Lexapro for few years with dose increase within last few months. He reports no improvement in anxiety/panic episodes with this medication.  He denies any current symptoms associated with depression. His wife also voices that she has noticed no change in severity or frequency of anxiety/panic attacks. Discussed plan of care and medication regimen, agreeable to stop Lexapro and start Prozac daily. Will start Prozac 10 mg daily x 15 days then increase to 20 mg daily. Will also start Hydroxyzine Pamoate 25 mg three times daily as needed. Encouraged to keep upcoming appt with Dr. Cuevas (Pulmonology).     -Start Prozac 10 mg daily x 15 days, then increase to 20 mg daily for anxiety/panic episodes  -Start hydroxyzine 25 mg 3 times daily as needed for anxiety/panic episodes.    -Discussed importance of counseling to decrease anxiety like symptoms. Discussed coping mechanisms to decrease stress and anxiety: relaxation techniques, guided  imagery, music therapy, staying active, diversional activities and avoid aggravating factors.    Encouraged patient to practice good sleep hygiene.  Discussed going to bed at the same time and getting up at the same time every day. Consider a quiet activity, such as reading, part of your nighttime routine. Make your bedroom a dark, comfortable place where it is easy to fall asleep. Avoid or limit caffeine consumption. Limit screen use, especially two hours prior to bed (this includes watching TV, using smartphone, tablet or computer).     GOALS:  Short Term Goals: Patient will be compliant with medication, and patient will have no significant medication related side effects.  Patient will be engaged in psychotherapy as indicated.  Patient will report subjective improvement of symptoms.  Long term goals: To stabilize mood and treat/improve subjective symptoms, the patient will stay out of the hospital, the patient will be at an optimal level of functioning, and the patient will take all medications as prescribed.  The patient/guardian verbalized understanding and agreement with goals that were mutually set.    TREATMENT PLAN: Continue supportive psychotherapy efforts and medications as indicated for patient's diagnosis.  Pharmacological and Non-Pharmacological treatment options discussed during today's visit. Patient/Guardian acknowledged and verbally consented with current treatment plan and was educated on the importance of compliance with treatment and follow-up appointments.      MEDICATION ISSUES:  Discussed medication options and treatment plan of prescribed medication as well as the risks, benefits, any black box warnings, and side effects including potential falls, possible impaired driving, and metabolic adversities among others. Patient is agreeable to call the office with any worsening of symptoms or onset of side effects, or if any concerns or questions arise.  The contact information for the office is  made available to the patient. Patient is agreeable to call 911 or go to the nearest ER should they begin having any SI/HI, or if any urgent concerns arise. No medication side effects or related complaints today.     MEDS ORDERED DURING VISIT:  New Medications Ordered This Visit   Medications    FLUoxetine (PROzac) 10 MG capsule     Sig: Take 1 capsule by mouth Daily for 15 days, THEN 2 capsules Daily for 15 days.     Dispense:  45 capsule     Refill:  0    hydrOXYzine pamoate (VISTARIL) 25 MG capsule     Sig: Take 1 capsule by mouth 3 (Three) Times a Day As Needed for Anxiety.     Dispense:  90 capsule     Refill:  1       FOLLOW UP:  Return in about 6 weeks (around 10/30/2023).             This document has been electronically signed by TIMOTHY Garnett  September 18, 2023 11:07 EDT    Please note that portions of this note were completed with a voice recognition program. Efforts were made to edit dictation, but occasionally words are mistranscribed.

## 2023-09-26 RX ORDER — DULAGLUTIDE 0.75 MG/.5ML
0.75 INJECTION, SOLUTION SUBCUTANEOUS WEEKLY
Qty: 2 ML | Refills: 3 | Status: SHIPPED | OUTPATIENT
Start: 2023-09-26

## 2023-09-26 NOTE — TELEPHONE ENCOUNTER
Rx Refill Note    Requested Prescriptions     Pending Prescriptions Disp Refills    Dulaglutide (Trulicity) 0.75 MG/0.5ML solution pen-injector 2 mL 3     Sig: Inject 0.75 mg under the skin into the appropriate area as directed 1 (One) Time Per Week.        Last office visit with prescribing clinician: 8/9/2023       Next office visit with prescribing clinician: 2/13/2024     {    Jacy Cole MA  09/26/23, 09:12 EDT

## 2023-10-11 ENCOUNTER — TELEPHONE (OUTPATIENT)
Dept: PSYCHIATRY | Facility: CLINIC | Age: 68
End: 2023-10-11
Payer: COMMERCIAL

## 2023-10-11 DIAGNOSIS — F41.0 PANIC ATTACKS: ICD-10-CM

## 2023-10-11 DIAGNOSIS — F41.9 ANXIETY: ICD-10-CM

## 2023-10-11 NOTE — TELEPHONE ENCOUNTER
Wife called regarding pt Prozac. States that he is still having panic attacks frequently, especially at night. Would like to know if it would be a good idea to increase the dose of Prozac or to try something else.

## 2023-10-12 ENCOUNTER — TRANSCRIBE ORDERS (OUTPATIENT)
Dept: LAB | Facility: HOSPITAL | Age: 68
End: 2023-10-12
Payer: COMMERCIAL

## 2023-10-12 DIAGNOSIS — R53.82 CHRONIC FATIGUE: Primary | ICD-10-CM

## 2023-10-12 DIAGNOSIS — R74.8 ACID PHOSPHATASE ELEVATED: ICD-10-CM

## 2023-10-12 RX ORDER — FLUOXETINE HYDROCHLORIDE 40 MG/1
40 CAPSULE ORAL DAILY
Qty: 30 CAPSULE | Refills: 1 | Status: SHIPPED | OUTPATIENT
Start: 2023-10-12

## 2023-10-21 ENCOUNTER — DOCUMENTATION (OUTPATIENT)
Dept: PULMONOLOGY | Facility: CLINIC | Age: 68
End: 2023-10-21
Payer: COMMERCIAL

## 2023-11-01 ENCOUNTER — LAB (OUTPATIENT)
Dept: LAB | Facility: HOSPITAL | Age: 68
End: 2023-11-01
Payer: COMMERCIAL

## 2023-11-01 ENCOUNTER — OFFICE VISIT (OUTPATIENT)
Dept: PSYCHIATRY | Facility: CLINIC | Age: 68
End: 2023-11-01
Payer: COMMERCIAL

## 2023-11-01 VITALS
DIASTOLIC BLOOD PRESSURE: 68 MMHG | HEIGHT: 65 IN | BODY MASS INDEX: 26.33 KG/M2 | OXYGEN SATURATION: 68 % | HEART RATE: 95 BPM | SYSTOLIC BLOOD PRESSURE: 124 MMHG | WEIGHT: 158 LBS

## 2023-11-01 DIAGNOSIS — F41.0 PANIC ATTACKS: ICD-10-CM

## 2023-11-01 DIAGNOSIS — R53.82 CHRONIC FATIGUE: ICD-10-CM

## 2023-11-01 DIAGNOSIS — R74.8 ACID PHOSPHATASE ELEVATED: ICD-10-CM

## 2023-11-01 DIAGNOSIS — F41.9 ANXIETY: ICD-10-CM

## 2023-11-01 LAB — 25(OH)D3 SERPL-MCNC: 6.8 NG/ML (ref 30–100)

## 2023-11-01 PROCEDURE — 36415 COLL VENOUS BLD VENIPUNCTURE: CPT

## 2023-11-01 PROCEDURE — 82306 VITAMIN D 25 HYDROXY: CPT

## 2023-11-01 NOTE — PROGRESS NOTES
Subjective   Pascual Kaur is a 68 y.o. male who presents today for follow up    Chief Complaint:  anxiety    History of Present Illness:   History of Present Illness  Pascual Kaur presents today accompanied by his wife for medication management follow-up.  Currently taking fluoxetine 40 mg daily, also prescribed hydroxyzine 25 mg 3 times daily as needed.  Verbalizes that he has not utilized hydroxyzine very often for anxiety. Says that he feels panic episodes have decreased in severity and duration.  Anxiety and panic attacks typically occur at night, has a hard time lying down. Says that panic episodes typically occur shortly after lying down requiring the need to sit up and tried to catch his breath.  He says that his wife can typically rub his back and helps with calming him and decreasing anxiety.  Denies any current bothersome symptoms associated with depression.  Had recent injections for diabetic retinopathy and says the injections have been helpful with improving vision. Had sleep study recently, has not had follow-up to discuss results yet. Has upcoming appointment with Dr. Cuevas in December. Denies SI/HI. PHQ-9 total score: 4, ANDREW-7 total score: 4.    Previous Psych Meds: Lexapro (ineffective)     The following portions of the patient's history were reviewed and updated as appropriate: allergies, current medications, past family history, past medical history, past social history, past surgical history and problem list.    Past Medical History:   Diagnosis Date    Bronchitis     Cataract     Cataract surgery 2 years ago in both eyes    COPD (chronic obstructive pulmonary disease)     Diabetes mellitus type I     Elevated liver enzymes     Chronic elevated liver enzymes with history of elevated alkaline phosphate as well as ALT and AST.    Fatigue     Generalized fatigue, nondescript, not new, not better with exercise, not worse with exercise, not better with rest    History of  "chemotherapy     Received 1 course of adjuvant carboplatin and Taxotere. Then we started carbo.Taxol off a 3 on, 1 off weekly regimen but he did not tolerate either of those and subsequentlly refused further adjuvant therapy. On follow-up scanning since that time.    History of CT scan of chest 2014    CT of chest noncontrast showed no evidence of recurrence. There was stable diffuse scarring in the right middle and upper lung consistent with postoperative changes and stable obstructive emphysematous changes.    History of MRI     MRI of brain negative    Impaired functional mobility, balance, gait, and endurance     Kidney stone     Lung cancer 2011    Stage IIA, T2N1, non-small cell lung cancer, status post wedge resection of a 3 cm primary, level 10 node positive, preop PET negative, and MRI of brain negative lung cancer. Ineligible for our MORAB trial due to the wedge resection.  Diagnosis was in May 2011. - No recurrence.    Pneumonia     As a small child    Sinusitis     Stress     regarding his occupation    Type 2 diabetes mellitus with hyperglycemia, with long-term current use of insulin 2021    Visual impairment     Diabetic retinopathy     Social History     Socioeconomic History    Marital status:    Tobacco Use    Smoking status: Former     Packs/day: 3.00     Years: 37.00     Additional pack years: 0.00     Total pack years: 111.00     Types: Cigarettes     Quit date: 2010     Years since quittin.8    Smokeless tobacco: Never    Tobacco comments:     \"He denies smoking.\"   Vaping Use    Vaping Use: Never used   Substance and Sexual Activity    Alcohol use: No    Drug use: No    Sexual activity: Defer     Comment: .     Family History   Problem Relation Age of Onset    Diabetes Mother     Hashimoto's thyroiditis Sister      Past Surgical History:   Procedure Laterality Date    CATARACT EXTRACTION  2021    CATARACT EXTRACTION  2021    CHOLECYSTECTOMY   "    EYE SURGERY Right     EYE SURGERY Left     KIDNEY STONE SURGERY      LUNG CANCER SURGERY      TONSILLECTOMY      VITRECTOMY Right 2023    Right eye     Patient Active Problem List   Diagnosis    Chronic obstructive pulmonary disease    Malignant neoplasm of upper lobe of right lung    Type 2 diabetes mellitus with hyperglycemia, with long-term current use of insulin    Rheumatoid arthritis involving multiple sites with positive rheumatoid factor    Reactive depression    Dizziness     Allergies   Allergen Reactions    Iodine GI Intolerance    Other GI Intolerance and Other (See Comments)     * IVP Dye    * IVP Dye * Nausea/Vomiting        Current Medications:   Current Outpatient Medications   Medication Sig Dispense Refill    albuterol sulfate  (90 Base) MCG/ACT inhaler Inhale 2 puffs by mouth Every 4 (Four) Hours As Needed for Wheezing. 8.5 g 2    aspirin 81 MG chewable tablet Chew 1 tablet Daily.      atorvastatin (LIPITOR) 20 MG tablet Take 1 tablet by mouth Daily. 90 tablet 0    dorzolamide-timolol (COSOPT) 22.3-6.8 MG/ML ophthalmic solution Instill 1 drop into the left eye 2 (two) times a day. 30 mL 3    Dulaglutide (Trulicity) 0.75 MG/0.5ML solution pen-injector Inject 0.75 mg under the skin into the appropriate area as directed 1 (One) Time Per Week. 2 mL 3    FLUoxetine (PROzac) 40 MG capsule Take 1 capsule by mouth Daily. 30 capsule 2    fluticasone (Flonase) 50 MCG/ACT nasal spray Instill 2 sprays into each nostril daily. 16 g 5    folic acid (FOLVITE) 1 MG tablet Take 1 tablet by mouth Daily. 30 tablet 3    glucose blood (Prodigy No Coding Blood Gluc) test strip Test glucose three times daily for diabetes (Patient taking differently: Test glucose three times daily for diabetes) 300 each 3    glucose blood test strip Use as directed 3-4 times a day (Patient taking differently: Use as directed 3-4 times a day) 100 each 5    Homeopathic Products (ALLERGY MEDICINE PO) Take 1 tablet by mouth Daily.   "    hydrOXYzine pamoate (VISTARIL) 25 MG capsule Take 1 capsule by mouth 3 (Three) Times a Day As Needed for Anxiety. 90 capsule 1    insulin aspart (NovoLOG FlexPen) 100 UNIT/ML solution pen-injector sc pen Inject as directed by correctional scale, MDD 20 units (Patient taking differently: 60 Units 1 (One) Time. Inject as directed by correctional scale, MDD 60 units dailly total) 15 mL 5    predniSONE (DELTASONE) 10 MG tablet Take 1 Tablet by mouth Daily for 2 - 5 days as needed for a flare up, may repeat once a week 30 tablet 3    insulin detemir (Levemir FlexTouch) 100 UNIT/ML injection Inject 30 Units under the skin into the appropriate area as directed 2 (Two) Times a Day. 30 mL 5    methotrexate 2.5 MG tablet Take 10 tablets by mouth 1 (One) Time Per Week. 40 tablet 0    omeprazole (priLOSEC) 40 MG capsule Take 1 capsule by mouth Daily. 90 capsule 0    vitamin D (ERGOCALCIFEROL) 1.25 MG (08287 UT) capsule capsule Take 1 capsule by mouth 1 (One) Time Per Week. 4 capsule 2     No current facility-administered medications for this visit.     Review of Systems   Constitutional:  Negative for activity change, appetite change, unexpected weight gain and unexpected weight loss.   Respiratory:  Negative for shortness of breath.    Cardiovascular:  Negative for chest pain.   Psychiatric/Behavioral:  Positive for sleep disturbance. Negative for hallucinations, suicidal ideas and depressed mood. The patient is nervous/anxious.      Physical Exam  Vitals reviewed.   Constitutional:       General: He is not in acute distress.     Appearance: Normal appearance.   Neurological:      Mental Status: He is alert.      Gait: Gait normal.         Vitals:   Blood pressure 124/68, pulse 95, height 165.1 cm (65\"), weight 71.7 kg (158 lb), SpO2 (!) 68%.    Mental Status Exam:   Hygiene:   good  Cooperation:  Cooperative  Eye Contact:  Fair  Psychomotor Behavior:  Appropriate  Affect:  Full range and Appropriate  Mood: " normal  Hopelessness: Denies  Speech:  Normal  Thought Process:  Goal directed and Linear  Thought Content:  Mood congruent  Suicidal:  None  Homicidal:  None  Hallucinations:  None  Delusion:  None  Memory:  Intact  Orientation:  Person, Place, Time, and Situation  Reliability:  good  Insight:  Good  Judgement:  Good  Impulse Control:  Good    Lab Results:   Lab on 11/01/2023   Component Date Value Ref Range Status    25 Hydroxy, Vitamin D 11/01/2023 6.8 (L)  30.0 - 100.0 ng/ml Final   Lab on 09/06/2023   Component Date Value Ref Range Status    Glucose 09/06/2023 159 (H)  65 - 99 mg/dL Final    BUN 09/06/2023 26 (H)  8 - 23 mg/dL Final    Creatinine 09/06/2023 1.34 (H)  0.76 - 1.27 mg/dL Final    Sodium 09/06/2023 138  136 - 145 mmol/L Final    Potassium 09/06/2023 4.7  3.5 - 5.2 mmol/L Final    Chloride 09/06/2023 99  98 - 107 mmol/L Final    CO2 09/06/2023 28.9  22.0 - 29.0 mmol/L Final    Calcium 09/06/2023 9.1  8.6 - 10.5 mg/dL Final    Total Protein 09/06/2023 6.7  6.0 - 8.5 g/dL Final    Albumin 09/06/2023 3.9  3.5 - 5.2 g/dL Final    ALT (SGPT) 09/06/2023 57 (H)  1 - 41 U/L Final    AST (SGOT) 09/06/2023 34  1 - 40 U/L Final    Alkaline Phosphatase 09/06/2023 207 (H)  39 - 117 U/L Final    Total Bilirubin 09/06/2023 0.3  0.0 - 1.2 mg/dL Final    Globulin 09/06/2023 2.8  gm/dL Final    A/G Ratio 09/06/2023 1.4  g/dL Final    BUN/Creatinine Ratio 09/06/2023 19.4  7.0 - 25.0 Final    Anion Gap 09/06/2023 10.1  5.0 - 15.0 mmol/L Final    eGFR 09/06/2023 58.1 (L)  >60.0 mL/min/1.73 Final    C-Reactive Protein 09/06/2023 <0.30  0.00 - 0.50 mg/dL Final    Sed Rate 09/06/2023 2  0 - 20 mm/hr Final    WBC 09/06/2023 7.43  3.40 - 10.80 10*3/mm3 Final    RBC 09/06/2023 4.11 (L)  4.14 - 5.80 10*6/mm3 Final    Hemoglobin 09/06/2023 12.7 (L)  13.0 - 17.7 g/dL Final    Hematocrit 09/06/2023 36.9 (L)  37.5 - 51.0 % Final    MCV 09/06/2023 89.8  79.0 - 97.0 fL Final    MCH 09/06/2023 30.9  26.6 - 33.0 pg Final    MCHC  09/06/2023 34.4  31.5 - 35.7 g/dL Final    RDW 09/06/2023 14.0  12.3 - 15.4 % Final    RDW-SD 09/06/2023 45.1  37.0 - 54.0 fl Final    MPV 09/06/2023 9.3  6.0 - 12.0 fL Final    Platelets 09/06/2023 245  140 - 450 10*3/mm3 Final    Neutrophil % 09/06/2023 55.5  42.7 - 76.0 % Final    Lymphocyte % 09/06/2023 32.3  19.6 - 45.3 % Final    Monocyte % 09/06/2023 6.2  5.0 - 12.0 % Final    Eosinophil % 09/06/2023 5.0  0.3 - 6.2 % Final    Basophil % 09/06/2023 0.9  0.0 - 1.5 % Final    Immature Grans % 09/06/2023 0.1  0.0 - 0.5 % Final    Neutrophils, Absolute 09/06/2023 4.12  1.70 - 7.00 10*3/mm3 Final    Lymphocytes, Absolute 09/06/2023 2.40  0.70 - 3.10 10*3/mm3 Final    Monocytes, Absolute 09/06/2023 0.46  0.10 - 0.90 10*3/mm3 Final    Eosinophils, Absolute 09/06/2023 0.37  0.00 - 0.40 10*3/mm3 Final    Basophils, Absolute 09/06/2023 0.07  0.00 - 0.20 10*3/mm3 Final    Immature Grans, Absolute 09/06/2023 0.01  0.00 - 0.05 10*3/mm3 Final    nRBC 09/06/2023 0.0  0.0 - 0.2 /100 WBC Final   Lab on 08/09/2023   Component Date Value Ref Range Status    Total Cholesterol 08/09/2023 127  0 - 200 mg/dL Final    Triglycerides 08/09/2023 190 (H)  0 - 150 mg/dL Final    HDL Cholesterol 08/09/2023 32 (L)  40 - 60 mg/dL Final    LDL Cholesterol  08/09/2023 63  0 - 100 mg/dL Final    VLDL Cholesterol 08/09/2023 32  5 - 40 mg/dL Final    LDL/HDL Ratio 08/09/2023 1.78   Final    Microalbumin/Creatinine Ratio 08/09/2023 782.7  mg/g Final    Creatinine, Urine 08/09/2023 110.0  mg/dL Final    Microalbumin, Urine 08/09/2023 86.1  mg/dL Final   Office Visit on 08/09/2023   Component Date Value Ref Range Status    Glucose 08/09/2023 277 (A)  70 - 130 mg/dL Final    Lot Number 08/09/2023 [id number]   Final    Expiration Date 08/09/2023 1-28-24   Final    Hemoglobin A1C 08/09/2023 6.7  % Final    Lot Number 08/09/2023 [id number]   Final    Expiration Date 08/09/2023 3-22-25   Final   Orders Only on 08/01/2023   Component Date Value Ref Range  Status    Glucose 08/09/2023 215 (H)  65 - 99 mg/dL Final    BUN 08/09/2023 21  8 - 23 mg/dL Final    Creatinine 08/09/2023 1.12  0.76 - 1.27 mg/dL Final    Sodium 08/09/2023 138  136 - 145 mmol/L Final    Potassium 08/09/2023 4.5  3.5 - 5.2 mmol/L Final    Chloride 08/09/2023 101  98 - 107 mmol/L Final    CO2 08/09/2023 27.0  22.0 - 29.0 mmol/L Final    Calcium 08/09/2023 9.4  8.6 - 10.5 mg/dL Final    Total Protein 08/09/2023 6.8  6.0 - 8.5 g/dL Final    Albumin 08/09/2023 4.2  3.5 - 5.2 g/dL Final    ALT (SGPT) 08/09/2023 57 (H)  1 - 41 U/L Final    AST (SGOT) 08/09/2023 38  1 - 40 U/L Final    Alkaline Phosphatase 08/09/2023 196 (H)  39 - 117 U/L Final    Total Bilirubin 08/09/2023 0.3  0.0 - 1.2 mg/dL Final    Globulin 08/09/2023 2.6  gm/dL Final    A/G Ratio 08/09/2023 1.6  g/dL Final    BUN/Creatinine Ratio 08/09/2023 18.8  7.0 - 25.0 Final    Anion Gap 08/09/2023 10.0  5.0 - 15.0 mmol/L Final    eGFR 08/09/2023 72.0  >60.0 mL/min/1.73 Final   Office Visit on 07/28/2023   Component Date Value Ref Range Status    Testosterone, Total 07/28/2023 357  264 - 916 ng/dL Final    Comment: Adult male reference interval is based on a population of  healthy nonobese males (BMI <30) between 19 and 39 years old.  teto [email or web address]. JCEM 2017,102;9382-2343. PMID: [id number].      Glucose 07/28/2023 63 (L)  70 - 99 mg/dL Final    BUN 07/28/2023 28 (H)  8 - 27 mg/dL Final    Creatinine 07/28/2023 1.76 (H)  0.76 - 1.27 mg/dL Final    EGFR Result 07/28/2023 42 (L)  >59 mL/min/1.73 Final    BUN/Creatinine Ratio 07/28/2023 16  10 - 24 Final    Sodium 07/28/2023 143  134 - 144 mmol/L Final    Potassium 07/28/2023 4.4  3.5 - 5.2 mmol/L Final    Chloride 07/28/2023 104  96 - 106 mmol/L Final    Total CO2 07/28/2023 24  20 - 29 mmol/L Final    Calcium 07/28/2023 9.5  8.6 - 10.2 mg/dL Final    Total Protein 07/28/2023 7.0  6.0 - 8.5 g/dL Final    Albumin 07/28/2023 4.5  3.9 - 4.9 g/dL Final    Globulin 07/28/2023 2.5  1.5 - 4.5 g/dL  Final    A/G Ratio 07/28/2023 1.8  1.2 - 2.2 Final    Total Bilirubin 07/28/2023 0.5  0.0 - 1.2 mg/dL Final    Alkaline Phosphatase 07/28/2023 149 (H)  44 - 121 IU/L Final    AST (SGOT) 07/28/2023 48 (H)  0 - 40 IU/L Final    ALT (SGPT) 07/28/2023 75 (H)  0 - 44 IU/L Final    WBC 07/28/2023 7.9  3.4 - 10.8 x10E3/uL Final    RBC 07/28/2023 4.28  4.14 - 5.80 x10E6/uL Final    Hemoglobin 07/28/2023 12.8 (L)  13.0 - 17.7 g/dL Final    Hematocrit 07/28/2023 38.7  37.5 - 51.0 % Final    MCV 07/28/2023 90  79 - 97 fL Final    MCH 07/28/2023 29.9  26.6 - 33.0 pg Final    MCHC 07/28/2023 33.1  31.5 - 35.7 g/dL Final    RDW 07/28/2023 15.7 (H)  11.6 - 15.4 % Final    Platelets 07/28/2023 200  150 - 450 x10E3/uL Final    TSH 07/28/2023 1.380  0.450 - 4.500 uIU/mL Final     EKG Results:  No orders to display     Assessment & Plan   Problems Addressed this Visit    None  Visit Diagnoses       Anxiety        Relevant Medications    FLUoxetine (PROzac) 40 MG capsule    Panic attacks        Relevant Medications    FLUoxetine (PROzac) 40 MG capsule          Diagnoses         Codes Comments    Anxiety     ICD-10-CM: F41.9  ICD-9-CM: 300.00     Panic attacks     ICD-10-CM: F41.0  ICD-9-CM: 300.01           Visit Diagnoses:    ICD-10-CM ICD-9-CM   1. Anxiety  F41.9 300.00   2. Panic attacks  F41.0 300.01     Pascual presents today along with his wife for medication management follow-up.  Reports overall improvement and anxiety/panic episodes since increasing medication dose.  Symptoms associated with anxiety and panic have decreased in severity and duration.  Episodes typically occur more at night when lying flat.  He did have recent sleep study and has follow-up with pulmonology in December.  Has not been utilizing hydroxyzine at night.  Discussed medication regimen and plan of care, will continue Prozac 40 mg daily and hydroxyzine 25 mg 3 times daily as needed for anxiety/panic episodes.  Encouraged him to try utilizing hydroxyzine at  night to help with anxiety occurring at bedtime.  Denies any adverse effects of current medication regimen.  Encouraged to keep upcoming appointment with pulmonology.    -Refill Prozac 40 mg daily for anxiety/panic episodes  -Start hydroxyzine 25 mg 3 times daily as needed for anxiety/panic episodes.    -Reviewed previous available documentation and most recent available labs.   MAKENZIE reviewed and is appropriate.     GOALS:  Short Term Goals: Patient will be compliant with medication, and patient will have no significant medication related side effects.  Patient will be engaged in psychotherapy as indicated.  Patient will report subjective improvement of symptoms.  Long term goals: To stabilize mood and treat/improve subjective symptoms, the patient will stay out of the hospital, the patient will be at an optimal level of functioning, and the patient will take all medications as prescribed.  The patient/guardian verbalized understanding and agreement with goals that were mutually set.    TREATMENT PLAN: Continue supportive psychotherapy efforts and medications as indicated for patient's diagnosis.  Pharmacological and Non-Pharmacological treatment options discussed during today's visit. Patient/Guardian acknowledged and verbally consented with current treatment plan and was educated on the importance of compliance with treatment and follow-up appointments.      MEDICATION ISSUES:  Discussed medication options and treatment plan of prescribed medication as well as the risks, benefits, any black box warnings, and side effects including potential falls, possible impaired driving, and metabolic adversities among others. Patient is agreeable to call the office with any worsening of symptoms or onset of side effects, or if any concerns or questions arise.  The contact information for the office is made available to the patient. Patient is agreeable to call 911 or go to the nearest ER should they begin having any SI/HI,  or if any urgent concerns arise. No medication side effects or related complaints today.     MEDS ORDERED DURING VISIT:  New Medications Ordered This Visit   Medications    FLUoxetine (PROzac) 40 MG capsule     Sig: Take 1 capsule by mouth Daily.     Dispense:  30 capsule     Refill:  2       FOLLOW UP:  Return in about 8 weeks (around 12/27/2023) for Recheck.             This document has been electronically signed by TIMOTHY Garnett  November 16, 2023 17:09 EST    Please note that portions of this note were completed with a voice recognition program. Efforts were made to edit dictation, but occasionally words are mistranscribed.

## 2023-11-06 DIAGNOSIS — K21.9 GASTROESOPHAGEAL REFLUX DISEASE WITHOUT ESOPHAGITIS: ICD-10-CM

## 2023-11-06 RX ORDER — INSULIN DETEMIR 100 [IU]/ML
30 INJECTION, SOLUTION SUBCUTANEOUS 2 TIMES DAILY
Qty: 30 ML | Refills: 5 | Status: SHIPPED | OUTPATIENT
Start: 2023-11-06

## 2023-11-06 NOTE — TELEPHONE ENCOUNTER
Rx Refill Note    Requested Prescriptions     Pending Prescriptions Disp Refills    insulin detemir (Levemir FlexTouch) 100 UNIT/ML injection 30 mL 5     Sig: Inject 30 Units under the skin into the appropriate area as directed 2 (Two) Times a Day.        Last office visit with prescribing clinician: 8/9/2023       Next office visit with prescribing clinician: 2/13/2024     {    Jacy Cole MA  11/06/23, 08:30 EST

## 2023-11-07 RX ORDER — OMEPRAZOLE 40 MG/1
40 CAPSULE, DELAYED RELEASE ORAL DAILY
Qty: 90 CAPSULE | Refills: 0 | Status: SHIPPED | OUTPATIENT
Start: 2023-11-07

## 2023-11-09 ENCOUNTER — TRANSCRIBE ORDERS (OUTPATIENT)
Dept: LAB | Facility: HOSPITAL | Age: 68
End: 2023-11-09
Payer: COMMERCIAL

## 2023-11-09 DIAGNOSIS — M06.09 RHEUMATOID ARTHRITIS OF MULTIPLE SITES WITHOUT RHEUMATOID FACTOR: Primary | ICD-10-CM

## 2023-11-15 RX ORDER — FLUOXETINE HYDROCHLORIDE 40 MG/1
40 CAPSULE ORAL DAILY
Qty: 30 CAPSULE | Refills: 2 | Status: SHIPPED | OUTPATIENT
Start: 2023-11-15

## 2023-11-20 ENCOUNTER — HOSPITAL ENCOUNTER (OUTPATIENT)
Dept: SLEEP MEDICINE | Facility: HOSPITAL | Age: 68
Discharge: HOME | End: 2023-11-20
Admitting: INTERNAL MEDICINE
Payer: COMMERCIAL

## 2023-11-20 DIAGNOSIS — G47.19 DAYTIME HYPERSOMNOLENCE: ICD-10-CM

## 2023-11-20 PROCEDURE — 95806 SLEEP STUDY UNATT&RESP EFFT: CPT

## 2023-12-04 DIAGNOSIS — G47.33 OSA (OBSTRUCTIVE SLEEP APNEA): Primary | ICD-10-CM

## 2023-12-22 ENCOUNTER — OFFICE VISIT (OUTPATIENT)
Dept: PULMONOLOGY | Facility: CLINIC | Age: 68
End: 2023-12-22
Payer: COMMERCIAL

## 2023-12-22 VITALS
WEIGHT: 153 LBS | SYSTOLIC BLOOD PRESSURE: 114 MMHG | OXYGEN SATURATION: 96 % | BODY MASS INDEX: 25.49 KG/M2 | DIASTOLIC BLOOD PRESSURE: 68 MMHG | RESPIRATION RATE: 18 BRPM | HEIGHT: 65 IN | HEART RATE: 85 BPM

## 2023-12-22 DIAGNOSIS — R06.02 SHORTNESS OF BREATH: ICD-10-CM

## 2023-12-22 DIAGNOSIS — J44.9 STAGE 3 SEVERE COPD BY GOLD CLASSIFICATION: Primary | ICD-10-CM

## 2023-12-22 DIAGNOSIS — R06.09 DYSPNEA ON EXERTION: ICD-10-CM

## 2023-12-22 DIAGNOSIS — Z23 NEED FOR INFLUENZA VACCINATION: ICD-10-CM

## 2023-12-22 DIAGNOSIS — G47.33 OSA (OBSTRUCTIVE SLEEP APNEA): ICD-10-CM

## 2023-12-22 RX ORDER — ALBUTEROL SULFATE 90 UG/1
2 AEROSOL, METERED RESPIRATORY (INHALATION) EVERY 4 HOURS PRN
Qty: 8.5 G | Refills: 2 | Status: SHIPPED | OUTPATIENT
Start: 2023-12-22

## 2023-12-22 RX ORDER — TIOTROPIUM BROMIDE AND OLODATEROL 3.124; 2.736 UG/1; UG/1
2 SPRAY, METERED RESPIRATORY (INHALATION)
Qty: 4 G | Refills: 5 | Status: SHIPPED | OUTPATIENT
Start: 2023-12-22

## 2023-12-22 NOTE — PROGRESS NOTES
New Pulmonary Patient Office Visit      Patient Name: Pascual Kaur    Referring Physician: No ref. provider found    Chief Complaint:    Chief Complaint   Patient presents with    Breathing Problem    Follow-up       History of Present Illness: Pascual Kaur is a 68 y.o. male who is here today for follow-up on sleep apnea and COPD.    Past medical history:   RA diagnosed in about 2020.  Was on Humira and recently switched to methotrexate due to renal dysfunction.  He feels like this controls it well, but his wife does not feel like it is done as much as expected.  Lung cancer diagnosed in about 2010 and required wedge resection and chemotherapy.  Had to stop chemotherapy secondary to inability to tolerate it.    Since last visit, he had sleep study which did show sleep apnea.  His home sleep study showed AHI of 14.3/h.  Has CPAP mask fitting scheduled in 2 weeks.    PFTs today showed severe obstructive lung disease.  Currently on albuterol only.  States he has previously been on Spiriva in the pill form but did not like it because the powder irritated his mouth.  He felt like he got thrush a few times from this.    He feels like he does have a chronic cough that is intermittently productive, intermittent wheezing and shortness of breath at times with exertion.  However, at other times he is fine.  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  He is able to do this for greater than 20 to 30 minutes, but at home often has difficulty walking from room to room.    He is accompanied to clinic today by his wife, Yandy, who works in surgery.    Subjective      Review of Systems:   Review of Systems   Constitutional:  Positive for fatigue.   Respiratory:  Positive for cough and shortness of breath.    Musculoskeletal:  Positive for arthralgias.   Neurological:  Positive for memory problem.   Psychiatric/Behavioral:  Positive for sleep disturbance.      Social History:  "  Social History     Socioeconomic History    Marital status:    Tobacco Use    Smoking status: Former     Packs/day: 3.00     Years: 37.00     Additional pack years: 0.00     Total pack years: 111.00     Types: Cigarettes     Quit date: 2010     Years since quittin.9    Smokeless tobacco: Never    Tobacco comments:     \"He denies smoking.\"   Vaping Use    Vaping Use: Never used   Substance and Sexual Activity    Alcohol use: No    Drug use: No    Sexual activity: Defer     Comment: .       Medications:     Current Outpatient Medications:     albuterol sulfate  (90 Base) MCG/ACT inhaler, Inhale 2 puffs by mouth Every 4 (Four) Hours As Needed for Wheezing., Disp: 8.5 g, Rfl: 2    aspirin 81 MG chewable tablet, Chew 1 tablet Daily., Disp: , Rfl:     atorvastatin (LIPITOR) 20 MG tablet, Take 1 tablet by mouth Daily., Disp: 90 tablet, Rfl: 0    Dulaglutide (Trulicity) 0.75 MG/0.5ML solution pen-injector, Inject 0.75 mg under the skin into the appropriate area as directed 1 (One) Time Per Week., Disp: 2 mL, Rfl: 3    FLUoxetine (PROzac) 40 MG capsule, Take 1 capsule by mouth Daily., Disp: 30 capsule, Rfl: 2    fluticasone (Flonase) 50 MCG/ACT nasal spray, Instill 2 sprays into each nostril daily., Disp: 16 g, Rfl: 5    folic acid (FOLVITE) 1 MG tablet, Take 1 tablet by mouth Daily., Disp: 30 tablet, Rfl: 3    glucose blood (Prodigy No Coding Blood Gluc) test strip, Test glucose three times daily for diabetes (Patient taking differently: Test glucose three times daily for diabetes), Disp: 300 each, Rfl: 3    glucose blood test strip, Use as directed 3-4 times a day (Patient taking differently: Use as directed 3-4 times a day), Disp: 100 each, Rfl: 5    Homeopathic Products (ALLERGY MEDICINE PO), Take 1 tablet by mouth Daily., Disp: , Rfl:     hydrOXYzine pamoate (VISTARIL) 25 MG capsule, Take 1 capsule by mouth 3 (Three) Times a Day As Needed for Anxiety., Disp: 90 capsule, Rfl: 1    insulin " aspart (NovoLOG FlexPen) 100 UNIT/ML solution pen-injector sc pen, Inject as directed by correctional scale, MDD 20 units (Patient taking differently: 60 Units 1 (One) Time. Inject as directed by correctional scale, MDD 60 units dailly total Reported prn 12/22/2023), Disp: 15 mL, Rfl: 5    insulin detemir (Levemir FlexTouch) 100 UNIT/ML injection, Inject 30 Units under the skin into the appropriate area as directed 2 (Two) Times a Day., Disp: 30 mL, Rfl: 5    methotrexate 2.5 MG tablet, Take 10 tablets by mouth 1 (One) Time Per Week., Disp: 40 tablet, Rfl: 4    omeprazole (priLOSEC) 40 MG capsule, Take 1 capsule by mouth Daily., Disp: 90 capsule, Rfl: 0    predniSONE (DELTASONE) 10 MG tablet, Take 1 Tablet by mouth Daily for 2 - 5 days as needed for a flare up, may repeat once a week (Patient taking differently: Reported prn), Disp: 30 tablet, Rfl: 3    vitamin D (ERGOCALCIFEROL) 1.25 MG (91761 UT) capsule capsule, Take 1 capsule by mouth 1 (One) Time Per Week., Disp: 4 capsule, Rfl: 2    dorzolamide-timolol (COSOPT) 22.3-6.8 MG/ML ophthalmic solution, Instill 1 drop into the left eye 2 (two) times a day. (Patient not taking: Reported on 12/22/2023), Disp: 30 mL, Rfl: 3    folic acid (FOLVITE) 1 MG tablet, Take 1 tablet by mouth Daily. (Patient not taking: Reported on 12/22/2023), Disp: 30 tablet, Rfl: 6    tiotropium bromide-olodaterol (Stiolto Respimat) 2.5-2.5 MCG/ACT aerosol solution inhaler, Inhale 2 puffs by mouth Daily., Disp: 4 g, Rfl: 5    vitamin D (ERGOCALCIFEROL) 1.25 MG (25687 UT) capsule capsule, Take 1 capsule by mouth 1 (One) Time Per Week. (Patient not taking: Reported on 12/22/2023), Disp: 4 capsule, Rfl: 2    Allergies:   Allergies   Allergen Reactions    Iodine GI Intolerance    Other GI Intolerance and Other (See Comments)     * IVP Dye    * IVP Dye * Nausea/Vomiting       Objective     Physical Exam:  Vital Signs:   Vitals:    12/22/23 1105   BP: 114/68   Pulse: 85   Resp: 18   SpO2: 96%  "  Weight: 69.4 kg (153 lb)   Height: 165.1 cm (65\")       Physical Exam  Vitals and nursing note reviewed.   Constitutional:       General: He is not in acute distress.     Appearance: He is well-developed. He is not ill-appearing.   HENT:      Head: Normocephalic and atraumatic.      Right Ear: Tympanic membrane and external ear normal.      Left Ear: Tympanic membrane and external ear normal.      Nose: Nose normal. No congestion or rhinorrhea.      Mouth/Throat:      Mouth: Mucous membranes are moist.      Pharynx: Oropharynx is clear. No oropharyngeal exudate or posterior oropharyngeal erythema.   Eyes:      General: No scleral icterus.        Right eye: No discharge.         Left eye: No discharge.      Extraocular Movements: Extraocular movements intact.      Conjunctiva/sclera: Conjunctivae normal.      Comments: Significant decreased vision which is his baseline   Neck:      Trachea: No tracheal deviation.   Cardiovascular:      Rate and Rhythm: Normal rate and regular rhythm.      Heart sounds: No murmur heard.  Pulmonary:      Effort: No respiratory distress.      Breath sounds: No wheezing or rhonchi.   Abdominal:      General: There is no distension.      Palpations: Abdomen is soft.   Musculoskeletal:         General: No tenderness. Normal range of motion.      Cervical back: Normal range of motion and neck supple.      Right lower leg: No edema.      Left lower leg: No edema.   Skin:     General: Skin is warm and dry.      Findings: No rash.   Neurological:      Mental Status: He is alert and oriented to person, place, and time.      Coordination: Coordination normal.      Gait: Gait normal.   Psychiatric:         Mood and Affect: Mood normal.         Behavior: Behavior normal.         Judgment: Judgment normal.      Comments: Relatively flat affect, but this seems to be his normal       Mallampati Score: III (soft and hard palate and base of uvula visible)    Results Review:   Labs: Reviewed.  Lab " "Results   Component Value Date    WBC 7.43 09/06/2023    HGB 12.7 (L) 09/06/2023    HCT 36.9 (L) 09/06/2023    MCV 89.8 09/06/2023     09/06/2023   Absolute eosinophils 0.37, eosinophils 5%    Lab Results   Component Value Date    GLUCOSE 159 (H) 09/06/2023    BUN 26 (H) 09/06/2023    CREATININE 1.34 (H) 09/06/2023    EGFRRESULT 42 (L) 07/28/2023    EGFR 58.1 (L) 09/06/2023    BCR 19.4 09/06/2023    K 4.7 09/06/2023    CO2 28.9 09/06/2023    CALCIUM 9.1 09/06/2023    PROTENTOTREF 7.0 07/28/2023    ALBUMIN 3.9 09/06/2023    BILITOT 0.3 09/06/2023    AST 34 09/06/2023    ALT 57 (H) 09/06/2023     Lab Results   Component Value Date    TSH 1.380 07/28/2023         No results found for: \"CBCDIF\", \"CMP\"     Micro: As of December 22, 2023   No results found for: \"RESPCX\"  No results found for: \"BLOODCX\"  Lab Results   Component Value Date    URINECX Final report 11/06/2020     No results found for: \"MRSACX\"  No results found for: \"MRSAPCR\"  No results found for: \"URCX\"  No components found for: \"LOWRESPCF\"  No results found for: \"THROATCX\"  No results found for: \"CULTURES\"  No components found for: \"STREPBCX\"  No results found for: \"STREPPNEUAG\"  No results found for: \"LEGIONELLA\"  No results found for: \"MYCOPLASCX\"  No results found for: \"GCCX\"  No results found for: \"WOUNDCX\"  No results found for: \"BODYFLDCX\"    ABG: No results found for: \"PHART\", \"WYP9CXY\", \"PO2ART\", \"HGBBG\", \"Q0BYWFUG\", \"CFIO2\", \"FCOHB\", \"CARBOXYHGB\", \"FMETHB\"    Echo:     Radiology Scans:   Images reviewed personally.     March 2023 chest x-ray  CLINICAL INDICATION:    Weak/Dizzy/AMS triage protocol     EXAMINATION TECHNIQUE:   XR CHEST 1 VW-     COMPARISON:  Radiographs 05/03/2022.     FINDINGS:  Multiple surgical clips in the right mid chest with the pulmonary  architectural distortion in the right hilar region, likely postoperative  sequela. Cardiac and mediastinal contours are within normal limits  except aortic atherosclerosis. Emphysema. " No dense consolidation. No  pneumothorax or pleural effusion. Postsurgical changes in the right mid  thoracic cage.     IMPRESSION:  No acute cardiopulmonary findings. Chronic changes as above.     Images personally reviewed, interpreted and dictated by HANNA Gore.                This report was signed and finalized on 3/16/2023 2:18 PM by HANNA Gore.      2015 EXAMINATION: RC CHEST WO CONTRAST-      INDICATION: Lung cancer.       TECHNIQUE: Multiple axial CT imaging was obtained of the chest without  the administration of intravenous contrast.     The radiation dose reduction device was turned on for each scan per the  ALARA (As Low as Reasonably Achievable) protocol.     COMPARISON: 6/19/2014.     FINDINGS: The thyroid is homogeneous in appearance. No mediastinal mass  or lymphadenopathy. Vascular calcifications are identified. Coronary  artery calcification is seen. No pericardial effusion. The cardiac  chambers are within normal limits. The lung parenchyma reveals some  scarring identified within the right upper lobe. Post surgical changes  are identified with staple lines. There is no evidence of recurrence.  There is no parenchymal consolidation, pulmonary mass or nodule. Minimal  scarring at the right lung base. No pleural effusion or pneumothorax.   Degenerative change is seen within the spine. The chest is essentially  stable and unchanged when compared to the prior study. Visualized  portions the upper abdomen are grossly unremarkable in appearance.     IMPRESSION- Stable scarring within the right upper lung field. No  evidence of recurrence or metastatic disease.     DICTATED:     06/25/2015  EDITED:          06/25/2015             Reading Radiologist- JU JAMIL       Releasing Radiologist- JU JAMIL       Released Date Time- 06/26/15 1344       - NORMA    November 2023 Home sleep study showed sleep apnea with AHI of 14.3/h.    PFT IMPRESSION:    December 2023 PFT showed FEV1 39%, FEV1/FVC 54.  No significant bronchodilator responsiveness.  DL/VA 93%.  Unable to perform lung volume maneuvers.  Assessment / Plan      Assessment/Plan:    1. Stage 3 severe COPD by GOLD classification    Reviewed PFTs with patient and his wife.  Evidently may have had PFTs in Dr. Boyle's office previously in about 2009.  Will try to obtain these records to see how lung function changed after wedge resection.  He is worried about getting thrush again and therefore we will try LAMA LABA combination with Stiolto.  Personally discussed risk, benefits and possible side effects of medication.  Personally demonstrated appropriate inhaler use with patient and wife in the room.  Sample given for the demonstration.    - tiotropium bromide-olodaterol (Stiolto Respimat) 2.5-2.5 MCG/ACT aerosol solution inhaler; Inhale 2 puffs by mouth Daily.  Dispense: 4 g; Refill: 5  - albuterol sulfate  (90 Base) MCG/ACT inhaler; Inhale 2 puffs by mouth Every 4 (Four) Hours As Needed for Wheezing.  Dispense: 8.5 g; Refill: 2    2. Need for influenza vaccination  Due for flu shot  - Fluzone High-Dose 65+yrs ([id number])    3. FERCHO (obstructive sleep apnea)  Plans to  CPAP machine within the next 2 weeks.  Will follow-up after 30 days of consistent use to review compliance report.    4. Dyspnea on exertion  Patient with chronic dyspnea, uses methotrexate and has history of lung cancer.  Will go ahead and repeat CT scan to rule out any underlying ILD given significant limitation in lung function on PFTs.    - CT Chest Without Contrast; Future    Follow Up:   Return in about 4 months (around 4/22/2024).    Samantha Cuevas MD  Pulmonary/Critical Care Physician   [facility]      Please note that portions of this note may have been completed with a voice recognition program. Efforts were made to edit the dictations, but occasionally words are mistranscribed.

## 2024-01-09 ENCOUNTER — TELEPHONE (OUTPATIENT)
Dept: PULMONOLOGY | Facility: CLINIC | Age: 69
End: 2024-01-09

## 2024-01-09 NOTE — TELEPHONE ENCOUNTER
Caller: KJ WILLOUGHBY    Best call back number: [phone]     REF # [id number]    Patient is needing: PATIENT WANTING CT DIAGNOSTIC ORDER FAXED TO [facility]     FAX # [phone]

## 2024-01-15 ENCOUNTER — TELEMEDICINE (OUTPATIENT)
Dept: PSYCHIATRY | Facility: CLINIC | Age: 69
End: 2024-01-15
Payer: COMMERCIAL

## 2024-01-15 DIAGNOSIS — F41.9 ANXIETY: Primary | ICD-10-CM

## 2024-01-15 DIAGNOSIS — F41.0 PANIC ATTACKS: ICD-10-CM

## 2024-01-15 PROCEDURE — 99214 OFFICE O/P EST MOD 30 MIN: CPT | Performed by: NURSE PRACTITIONER

## 2024-01-15 RX ORDER — HYDROXYZINE PAMOATE 25 MG/1
25 CAPSULE ORAL 3 TIMES DAILY PRN
Qty: 90 CAPSULE | Refills: 2 | Status: SHIPPED | OUTPATIENT
Start: 2024-01-15

## 2024-01-15 NOTE — PROGRESS NOTES
This provider is located at [facility], [address], [address] [facility] [place] [facility]"" in [place] [address], using a secure [employer] Video Visit through [email or web address]. Patient is being seen remotely via telehealth at their home address in [place] [address], and stated they are in a secure environment for this session. The patient's condition being diagnosed/treated is appropriate for telemedicine. The provider identified herself as well as her credentials. The patient, and/or patients guardian, consent to be seen remotely, and when consent is given they understand that the consent allows for patient identifiable information to be sent to a third party as needed. They may refuse to be seen remotely at any time. The electronic data is encrypted and password protected, and the patient and/or guardian has been advised of the potential risks to privacy not withstanding such measures.     You have chosen to receive care through a telehealth visit.  Do you consent to use a video/audio connection for your medical care today? Yes    Subjective   Pascual Kaur is a 68 y.o. male who presents today for follow up    Chief Complaint:  anxiety    History of Present Illness:   History of Present Illness  Pascual Kaur presents today via MyChart video visit for medication management follow-up, accompanied by his wife to Yandy.  Currently taking Prozac 40 mg daily and hydroxyzine 25 mg 3 times daily as needed. Reports overall improvement in anxiety/panic episodes since last visit.  If he experiences anxiety in the evening, will utilize hydroxyzine at bedtime to help with decreasing anxiety. Has continued to see  and now has a CPAP that he wears at night.  Feels that he is doing better during the day, has noticed overall improvement in mood and says that he enjoys going to work.  Currently sleeping about 4 to 5 hours consecutively with CPAP. Denies any SI/HI.  PHQ-9 total score: 1,  ANDREW-7 total score: 2.    Previous Psych Meds: Lexapro (ineffective)     The following portions of the patient's history were reviewed and updated as appropriate: allergies, current medications, past family history, past medical history, past social history, past surgical history and problem list.    Past Medical History:   Diagnosis Date    Bronchitis     Cataract     Cataract surgery 2 years ago in both eyes    COPD (chronic obstructive pulmonary disease)     Diabetes mellitus type I     Elevated liver enzymes     Chronic elevated liver enzymes with history of elevated alkaline phosphate as well as ALT and AST.    Fatigue     Generalized fatigue, nondescript, not new, not better with exercise, not worse with exercise, not better with rest    History of chemotherapy     Received 1 course of adjuvant carboplatin and Taxotere. Then we started carbo.Taxol off a 3 on, 1 off weekly regimen but he did not tolerate either of those and subsequentlly refused further adjuvant therapy. On follow-up scanning since that time.    History of CT scan of chest 06/19/2014    CT of chest noncontrast showed no evidence of recurrence. There was stable diffuse scarring in the right middle and upper lung consistent with postoperative changes and stable obstructive emphysematous changes.    History of MRI     MRI of brain negative    Impaired functional mobility, balance, gait, and endurance     Kidney stone     Lung cancer 05/2011    Stage IIA, T2N1, non-small cell lung cancer, status post wedge resection of a 3 cm primary, level 10 node positive, preop PET negative, and MRI of brain negative lung cancer. Ineligible for our MORAB trial due to the wedge resection.  Diagnosis was in May 2011. - No recurrence.    Pneumonia     As a small child    Sinusitis     Stress     regarding his occupation    Type 2 diabetes mellitus with hyperglycemia, with long-term current use of insulin 02/04/2021    Visual impairment     Diabetic retinopathy  "    Social History     Socioeconomic History    Marital status:    Tobacco Use    Smoking status: Former     Packs/day: 3.00     Years: 37.00     Additional pack years: 0.00     Total pack years: 111.00     Types: Cigarettes     Quit date: 2010     Years since quittin.0    Smokeless tobacco: Never    Tobacco comments:     \"He denies smoking.\"   Vaping Use    Vaping Use: Never used   Substance and Sexual Activity    Alcohol use: No    Drug use: No    Sexual activity: Defer     Comment: .     Family History   Problem Relation Age of Onset    Diabetes Mother     Hashimoto's thyroiditis Sister      Past Surgical History:   Procedure Laterality Date    CATARACT EXTRACTION  2021    CATARACT EXTRACTION  2021    CHOLECYSTECTOMY      EYE SURGERY Right     EYE SURGERY Left     KIDNEY STONE SURGERY      LUNG CANCER SURGERY      TONSILLECTOMY      VITRECTOMY Right     Right eye     Patient Active Problem List   Diagnosis    Chronic obstructive pulmonary disease    Malignant neoplasm of upper lobe of right lung    Type 2 diabetes mellitus with hyperglycemia, with long-term current use of insulin    Rheumatoid arthritis involving multiple sites with positive rheumatoid factor    Reactive depression    Dizziness     Allergies   Allergen Reactions    Iodine GI Intolerance    Other GI Intolerance and Other (See Comments)     * IVP Dye    * IVP Dye * Nausea/Vomiting        Current Medications:   Current Outpatient Medications   Medication Sig Dispense Refill    hydrOXYzine pamoate (VISTARIL) 25 MG capsule Take 1 capsule by mouth 3 (Three) Times a Day As Needed for Anxiety. 90 capsule 2    albuterol sulfate  (90 Base) MCG/ACT inhaler Inhale 2 puffs by mouth Every 4 (Four) Hours As Needed for Wheezing. 8.5 g 2    aspirin 81 MG chewable tablet Chew 1 tablet Daily.      atorvastatin (LIPITOR) 20 MG tablet Take 1 tablet by mouth Daily. 90 tablet 0    Dulaglutide (Trulicity) 0.75 MG/0.5ML " solution pen-injector Inject 0.75 mg under the skin into the appropriate area as directed 1 (One) Time Per Week. 2 mL 3    FLUoxetine (PROzac) 40 MG capsule Take 1 capsule by mouth Daily. 30 capsule 2    fluticasone (Flonase) 50 MCG/ACT nasal spray Instill 2 sprays into each nostril daily. 16 g 5    folic acid (FOLVITE) 1 MG tablet Take 1 tablet by mouth Daily. 30 tablet 3    glucose blood (Prodigy No Coding Blood Gluc) test strip Test glucose three times daily for diabetes (Patient taking differently: Test glucose three times daily for diabetes) 300 each 3    glucose blood test strip Use as directed 3-4 times a day (Patient taking differently: Use as directed 3-4 times a day) 100 each 5    Homeopathic Products (ALLERGY MEDICINE PO) Take 1 tablet by mouth Daily.      insulin aspart (NovoLOG FlexPen) 100 UNIT/ML solution pen-injector sc pen Inject as directed by correctional scale, MDD 20 units (Patient taking differently: 60 Units 1 (One) Time. Inject as directed by correctional scale, MDD 60 units elidia total  Reported prn 12/22/2023) 15 mL 5    insulin detemir (Levemir FlexTouch) 100 UNIT/ML injection Inject 30 Units under the skin into the appropriate area as directed 2 (Two) Times a Day. 30 mL 5    methotrexate 2.5 MG tablet Take 10 tablets by mouth 1 (One) Time Per Week. 40 tablet 4    omeprazole (priLOSEC) 40 MG capsule Take 1 capsule by mouth Daily. 90 capsule 0    predniSONE (DELTASONE) 10 MG tablet Take 1 Tablet by mouth Daily for 2 - 5 days as needed for a flare up, may repeat once a week (Patient taking differently: Reported prn) 30 tablet 3    tiotropium bromide-olodaterol (Stiolto Respimat) 2.5-2.5 MCG/ACT aerosol solution inhaler Inhale 2 puffs by mouth Daily. 4 g 5    vitamin D (ERGOCALCIFEROL) 1.25 MG (86852 UT) capsule capsule Take 1 capsule by mouth 1 (One) Time Per Week. 4 capsule 2    vitamin D (ERGOCALCIFEROL) 1.25 MG (97206 UT) capsule capsule Take 1 capsule by mouth 1 (One) Time Per Week.  (Patient not taking: Reported on 12/22/2023) 4 capsule 2     No current facility-administered medications for this visit.     Review of Systems   Constitutional:  Negative for activity change, appetite change, unexpected weight gain and unexpected weight loss.   Respiratory:  Negative for shortness of breath.    Cardiovascular:  Negative for chest pain.   Psychiatric/Behavioral:  Positive for sleep disturbance. Negative for suicidal ideas and depressed mood. The patient is nervous/anxious.      Physical Exam  Constitutional:       General: He is not in acute distress.     Appearance: Normal appearance.   Neurological:      Mental Status: He is alert.     Vitals:   The following portions of the patient's history were reviewed and updated as appropriate: allergies, current medications, past family history, past medical history, past social history, past surgical history and problem list.    Mental Status Exam:   Hygiene:    appears good  Cooperation:  Cooperative  Eye Contact:   ELVI r/t video visit  Psychomotor Behavior:  Appropriate  Affect:  Full range and Appropriate  Mood: normal  Hopelessness: Denies  Speech:  Normal  Thought Process:  Goal directed and Linear  Thought Content:  Mood congruent  Suicidal:  None  Homicidal:  None  Hallucinations:  None  Delusion:  None  Memory:  Intact  Orientation:  Person, Place, Time, and Situation  Reliability:  good  Insight:  Good  Judgement:  Good  Impulse Control:  Good    Lab Results:   Lab on 11/01/2023   Component Date Value Ref Range Status    25 Hydroxy, Vitamin D 11/01/2023 6.8 (L)  30.0 - 100.0 ng/ml Final   Lab on 09/06/2023   Component Date Value Ref Range Status    Glucose 09/06/2023 159 (H)  65 - 99 mg/dL Final    BUN 09/06/2023 26 (H)  8 - 23 mg/dL Final    Creatinine 09/06/2023 1.34 (H)  0.76 - 1.27 mg/dL Final    Sodium 09/06/2023 138  136 - 145 mmol/L Final    Potassium 09/06/2023 4.7  3.5 - 5.2 mmol/L Final    Chloride 09/06/2023 99  98 - 107 mmol/L Final     CO2 09/06/2023 28.9  22.0 - 29.0 mmol/L Final    Calcium 09/06/2023 9.1  8.6 - 10.5 mg/dL Final    Total Protein 09/06/2023 6.7  6.0 - 8.5 g/dL Final    Albumin 09/06/2023 3.9  3.5 - 5.2 g/dL Final    ALT (SGPT) 09/06/2023 57 (H)  1 - 41 U/L Final    AST (SGOT) 09/06/2023 34  1 - 40 U/L Final    Alkaline Phosphatase 09/06/2023 207 (H)  39 - 117 U/L Final    Total Bilirubin 09/06/2023 0.3  0.0 - 1.2 mg/dL Final    Globulin 09/06/2023 2.8  gm/dL Final    A/G Ratio 09/06/2023 1.4  g/dL Final    BUN/Creatinine Ratio 09/06/2023 19.4  7.0 - 25.0 Final    Anion Gap 09/06/2023 10.1  5.0 - 15.0 mmol/L Final    eGFR 09/06/2023 58.1 (L)  >60.0 mL/min/1.73 Final    C-Reactive Protein 09/06/2023 <0.30  0.00 - 0.50 mg/dL Final    Sed Rate 09/06/2023 2  0 - 20 mm/hr Final    WBC 09/06/2023 7.43  3.40 - 10.80 10*3/mm3 Final    RBC 09/06/2023 4.11 (L)  4.14 - 5.80 10*6/mm3 Final    Hemoglobin 09/06/2023 12.7 (L)  13.0 - 17.7 g/dL Final    Hematocrit 09/06/2023 36.9 (L)  37.5 - 51.0 % Final    MCV 09/06/2023 89.8  79.0 - 97.0 fL Final    MCH 09/06/2023 30.9  26.6 - 33.0 pg Final    MCHC 09/06/2023 34.4  31.5 - 35.7 g/dL Final    RDW 09/06/2023 14.0  12.3 - 15.4 % Final    RDW-SD 09/06/2023 45.1  37.0 - 54.0 fl Final    MPV 09/06/2023 9.3  6.0 - 12.0 fL Final    Platelets 09/06/2023 245  140 - 450 10*3/mm3 Final    Neutrophil % 09/06/2023 55.5  42.7 - 76.0 % Final    Lymphocyte % 09/06/2023 32.3  19.6 - 45.3 % Final    Monocyte % 09/06/2023 6.2  5.0 - 12.0 % Final    Eosinophil % 09/06/2023 5.0  0.3 - 6.2 % Final    Basophil % 09/06/2023 0.9  0.0 - 1.5 % Final    Immature Grans % 09/06/2023 0.1  0.0 - 0.5 % Final    Neutrophils, Absolute 09/06/2023 4.12  1.70 - 7.00 10*3/mm3 Final    Lymphocytes, Absolute 09/06/2023 2.40  0.70 - 3.10 10*3/mm3 Final    Monocytes, Absolute 09/06/2023 0.46  0.10 - 0.90 10*3/mm3 Final    Eosinophils, Absolute 09/06/2023 0.37  0.00 - 0.40 10*3/mm3 Final    Basophils, Absolute 09/06/2023 0.07  0.00 - 0.20  10*3/mm3 Final    Immature Grans, Absolute 09/06/2023 0.01  0.00 - 0.05 10*3/mm3 Final    nRBC 09/06/2023 0.0  0.0 - 0.2 /100 WBC Final   Lab on 08/09/2023   Component Date Value Ref Range Status    Total Cholesterol 08/09/2023 127  0 - 200 mg/dL Final    Triglycerides 08/09/2023 190 (H)  0 - 150 mg/dL Final    HDL Cholesterol 08/09/2023 32 (L)  40 - 60 mg/dL Final    LDL Cholesterol  08/09/2023 63  0 - 100 mg/dL Final    VLDL Cholesterol 08/09/2023 32  5 - 40 mg/dL Final    LDL/HDL Ratio 08/09/2023 1.78   Final    Microalbumin/Creatinine Ratio 08/09/2023 782.7  mg/g Final    Creatinine, Urine 08/09/2023 110.0  mg/dL Final    Microalbumin, Urine 08/09/2023 86.1  mg/dL Final   Office Visit on 08/09/2023   Component Date Value Ref Range Status    Glucose 08/09/2023 277 (A)  70 - 130 mg/dL Final    Lot Number 08/09/2023 [id number]   Final    Expiration Date 08/09/2023 1-28-24   Final    Hemoglobin A1C 08/09/2023 6.7  % Final    Lot Number 08/09/2023 [id number]   Final    Expiration Date 08/09/2023 3-22-25   Final   Orders Only on 08/01/2023   Component Date Value Ref Range Status    Glucose 08/09/2023 215 (H)  65 - 99 mg/dL Final    BUN 08/09/2023 21  8 - 23 mg/dL Final    Creatinine 08/09/2023 1.12  0.76 - 1.27 mg/dL Final    Sodium 08/09/2023 138  136 - 145 mmol/L Final    Potassium 08/09/2023 4.5  3.5 - 5.2 mmol/L Final    Chloride 08/09/2023 101  98 - 107 mmol/L Final    CO2 08/09/2023 27.0  22.0 - 29.0 mmol/L Final    Calcium 08/09/2023 9.4  8.6 - 10.5 mg/dL Final    Total Protein 08/09/2023 6.8  6.0 - 8.5 g/dL Final    Albumin 08/09/2023 4.2  3.5 - 5.2 g/dL Final    ALT (SGPT) 08/09/2023 57 (H)  1 - 41 U/L Final    AST (SGOT) 08/09/2023 38  1 - 40 U/L Final    Alkaline Phosphatase 08/09/2023 196 (H)  39 - 117 U/L Final    Total Bilirubin 08/09/2023 0.3  0.0 - 1.2 mg/dL Final    Globulin 08/09/2023 2.6  gm/dL Final    A/G Ratio 08/09/2023 1.6  g/dL Final    BUN/Creatinine Ratio 08/09/2023 18.8  7.0 - 25.0 Final     Anion Gap 08/09/2023 10.0  5.0 - 15.0 mmol/L Final    eGFR 08/09/2023 72.0  >60.0 mL/min/1.73 Final   Office Visit on 07/28/2023   Component Date Value Ref Range Status    Testosterone, Total 07/28/2023 357  264 - 916 ng/dL Final    Comment: Adult male reference interval is based on a population of  healthy nonobese males (BMI <30) between 19 and 39 years old.  Angelica et.al. JCEM 2017,102;1322-4334. PMID: [id number].      Glucose 07/28/2023 63 (L)  70 - 99 mg/dL Final    BUN 07/28/2023 28 (H)  8 - 27 mg/dL Final    Creatinine 07/28/2023 1.76 (H)  0.76 - 1.27 mg/dL Final    EGFR Result 07/28/2023 42 (L)  >59 mL/min/1.73 Final    BUN/Creatinine Ratio 07/28/2023 16  10 - 24 Final    Sodium 07/28/2023 143  134 - 144 mmol/L Final    Potassium 07/28/2023 4.4  3.5 - 5.2 mmol/L Final    Chloride 07/28/2023 104  96 - 106 mmol/L Final    Total CO2 07/28/2023 24  20 - 29 mmol/L Final    Calcium 07/28/2023 9.5  8.6 - 10.2 mg/dL Final    Total Protein 07/28/2023 7.0  6.0 - 8.5 g/dL Final    Albumin 07/28/2023 4.5  3.9 - 4.9 g/dL Final    Globulin 07/28/2023 2.5  1.5 - 4.5 g/dL Final    A/G Ratio 07/28/2023 1.8  1.2 - 2.2 Final    Total Bilirubin 07/28/2023 0.5  0.0 - 1.2 mg/dL Final    Alkaline Phosphatase 07/28/2023 149 (H)  44 - 121 IU/L Final    AST (SGOT) 07/28/2023 48 (H)  0 - 40 IU/L Final    ALT (SGPT) 07/28/2023 75 (H)  0 - 44 IU/L Final    WBC 07/28/2023 7.9  3.4 - 10.8 x10E3/uL Final    RBC 07/28/2023 4.28  4.14 - 5.80 x10E6/uL Final    Hemoglobin 07/28/2023 12.8 (L)  13.0 - 17.7 g/dL Final    Hematocrit 07/28/2023 38.7  37.5 - 51.0 % Final    MCV 07/28/2023 90  79 - 97 fL Final    MCH 07/28/2023 29.9  26.6 - 33.0 pg Final    MCHC 07/28/2023 33.1  31.5 - 35.7 g/dL Final    RDW 07/28/2023 15.7 (H)  11.6 - 15.4 % Final    Platelets 07/28/2023 200  150 - 450 x10E3/uL Final    TSH 07/28/2023 1.380  0.450 - 4.500 uIU/mL Final     EKG Results:  No orders to display     Assessment & Plan   Problems Addressed this Visit     None  Visit Diagnoses       Anxiety    -  Primary    Relevant Medications    hydrOXYzine pamoate (VISTARIL) 25 MG capsule    Panic attacks        Relevant Medications    hydrOXYzine pamoate (VISTARIL) 25 MG capsule          Diagnoses         Codes Comments    Anxiety    -  Primary ICD-10-CM: F41.9  ICD-9-CM: 300.00     Panic attacks     ICD-10-CM: F41.0  ICD-9-CM: 300.01           Visit Diagnoses:    ICD-10-CM ICD-9-CM   1. Anxiety  F41.9 300.00   2. Panic attacks  F41.0 300.01     Pascual presents today for medication management follow-up via [facility], accompanied by his wife.  Both relate that he and his wife Paulino reports that he is doing well with current medication regimen.  Has noticed overall improvement in anxiety, mood and daytime fatigue.  Has been wearing CPAP at night as instructed.  Voices that he is happy with current medication regimen.  Will continue with Prozac 40 mg daily and hydroxyzine 25 mg 3 times daily as needed for anxiety/sleep.  Denies any adverse effects of medication regimen.    -Continue Prozac 40 mg daily for anxiety/panic episodes  -Refill hydroxyzine 25 mg 3 times daily as needed for anxiety/panic episodes.    -Reviewed previous available documentation and most recent available labs.   MAKENZIE reviewed and is appropriate.     GOALS:  Short Term Goals: Patient will be compliant with medication, and patient will have no significant medication related side effects.  Patient will be engaged in psychotherapy as indicated.  Patient will report subjective improvement of symptoms.  Long term goals: To stabilize mood and treat/improve subjective symptoms, the patient will stay out of the hospital, the patient will be at an optimal level of functioning, and the patient will take all medications as prescribed.  The patient/guardian verbalized understanding and agreement with goals that were mutually set.    TREATMENT PLAN: Continue supportive psychotherapy efforts and medications as  indicated for patient's diagnosis.  Pharmacological and Non-Pharmacological treatment options discussed during today's visit. Patient/Guardian acknowledged and verbally consented with current treatment plan and was educated on the importance of compliance with treatment and follow-up appointments.      MEDICATION ISSUES:  Discussed medication options and treatment plan of prescribed medication as well as the risks, benefits, any black box warnings, and side effects including potential falls, possible impaired driving, and metabolic adversities among others. Patient is agreeable to call the office with any worsening of symptoms or onset of side effects, or if any concerns or questions arise.  The contact information for the office is made available to the patient. Patient is agreeable to call 911 or go to the nearest ER should they begin having any SI/HI, or if any urgent concerns arise. No medication side effects or related complaints today.     MEDS ORDERED DURING VISIT:  New Medications Ordered This Visit   Medications    hydrOXYzine pamoate (VISTARIL) 25 MG capsule     Sig: Take 1 capsule by mouth 3 (Three) Times a Day As Needed for Anxiety.     Dispense:  90 capsule     Refill:  2       FOLLOW UP:  Return in about 3 months (around 4/15/2024) for Recheck, Video visit.             This document has been electronically signed by TIMOTHY Garnett  January 15, 2024 14:45 EST    Please note that portions of this note were completed with a voice recognition program. Efforts were made to edit dictation, but occasionally words are mistranscribed.

## 2024-01-18 RX ORDER — ATORVASTATIN CALCIUM 20 MG/1
20 TABLET, FILM COATED ORAL DAILY
Qty: 60 TABLET | Refills: 0 | Status: SHIPPED | OUTPATIENT
Start: 2024-01-18

## 2024-01-23 ENCOUNTER — TELEPHONE (OUTPATIENT)
Dept: PULMONOLOGY | Facility: CLINIC | Age: 69
End: 2024-01-23
Payer: COMMERCIAL

## 2024-01-23 NOTE — TELEPHONE ENCOUNTER
I called and discussed CT scan results with patient's wife, Yandy, at patient's request.  We discussed that CT scan did not show any concerning findings.  There is some scarring from posttreatment changes, but no lymphadenopathy or lung nodules appreciated.

## 2024-01-24 DIAGNOSIS — R06.09 DYSPNEA ON EXERTION: ICD-10-CM

## 2024-02-13 ENCOUNTER — OFFICE VISIT (OUTPATIENT)
Dept: ENDOCRINOLOGY | Facility: CLINIC | Age: 69
End: 2024-02-13
Payer: COMMERCIAL

## 2024-02-13 VITALS
BODY MASS INDEX: 25.99 KG/M2 | WEIGHT: 156 LBS | HEIGHT: 65 IN | OXYGEN SATURATION: 96 % | HEART RATE: 88 BPM | SYSTOLIC BLOOD PRESSURE: 120 MMHG | DIASTOLIC BLOOD PRESSURE: 64 MMHG

## 2024-02-13 DIAGNOSIS — Z79.4 TYPE 2 DIABETES MELLITUS WITH HYPERGLYCEMIA, WITH LONG-TERM CURRENT USE OF INSULIN: Primary | ICD-10-CM

## 2024-02-13 DIAGNOSIS — E78.5 HYPERLIPIDEMIA, UNSPECIFIED HYPERLIPIDEMIA TYPE: ICD-10-CM

## 2024-02-13 DIAGNOSIS — E11.65 TYPE 2 DIABETES MELLITUS WITH HYPERGLYCEMIA, WITH LONG-TERM CURRENT USE OF INSULIN: Primary | ICD-10-CM

## 2024-02-13 LAB
EXPIRATION DATE: ABNORMAL
EXPIRATION DATE: NORMAL
GLUCOSE BLDC GLUCOMTR-MCNC: 87 MG/DL (ref 70–130)
HBA1C MFR BLD: 8.4 % (ref 4.5–5.7)
Lab: ABNORMAL
Lab: NORMAL

## 2024-02-13 PROCEDURE — 83036 HEMOGLOBIN GLYCOSYLATED A1C: CPT | Performed by: INTERNAL MEDICINE

## 2024-02-13 PROCEDURE — 99214 OFFICE O/P EST MOD 30 MIN: CPT | Performed by: INTERNAL MEDICINE

## 2024-02-13 PROCEDURE — 82947 ASSAY GLUCOSE BLOOD QUANT: CPT | Performed by: INTERNAL MEDICINE

## 2024-02-13 RX ORDER — INSULIN DETEMIR 100 [IU]/ML
30 INJECTION, SOLUTION SUBCUTANEOUS 2 TIMES DAILY
Qty: 30 ML | Refills: 5 | Status: SHIPPED | OUTPATIENT
Start: 2024-02-13

## 2024-02-13 RX ORDER — INSULIN ASPART 100 [IU]/ML
INJECTION, SOLUTION INTRAVENOUS; SUBCUTANEOUS
Qty: 15 ML | Refills: 5 | Status: SHIPPED | OUTPATIENT
Start: 2024-02-13

## 2024-03-14 DIAGNOSIS — K21.9 GASTROESOPHAGEAL REFLUX DISEASE WITHOUT ESOPHAGITIS: ICD-10-CM

## 2024-03-14 RX ORDER — OMEPRAZOLE 40 MG/1
40 CAPSULE, DELAYED RELEASE ORAL DAILY
Qty: 90 CAPSULE | Refills: 0 | Status: SHIPPED | OUTPATIENT
Start: 2024-03-14

## 2024-03-19 RX ORDER — DULAGLUTIDE 0.75 MG/.5ML
0.75 INJECTION, SOLUTION SUBCUTANEOUS WEEKLY
Qty: 2 ML | Refills: 3 | Status: SHIPPED | OUTPATIENT
Start: 2024-03-19

## 2024-03-19 NOTE — TELEPHONE ENCOUNTER
Rx Refill Note  Requested Prescriptions     Pending Prescriptions Disp Refills    Dulaglutide (Trulicity) 0.75 MG/0.5ML solution pen-injector 2 mL 3     Sig: Inject 0.75 mg under the skin into the appropriate area as directed 1 (One) Time Per Week.      Last office visit with prescribing clinician: 2/13/2024     Next office visit with prescribing clinician: 7/23/2024

## 2024-04-08 DIAGNOSIS — F41.0 PANIC ATTACKS: ICD-10-CM

## 2024-04-08 DIAGNOSIS — K21.9 GASTROESOPHAGEAL REFLUX DISEASE WITHOUT ESOPHAGITIS: ICD-10-CM

## 2024-04-08 DIAGNOSIS — F41.9 ANXIETY: ICD-10-CM

## 2024-04-08 RX ORDER — FLUOXETINE HYDROCHLORIDE 40 MG/1
40 CAPSULE ORAL DAILY
Qty: 30 CAPSULE | Refills: 2 | Status: SHIPPED | OUTPATIENT
Start: 2024-04-08

## 2024-04-08 RX ORDER — OMEPRAZOLE 40 MG/1
40 CAPSULE, DELAYED RELEASE ORAL DAILY
Qty: 90 CAPSULE | Refills: 3 | Status: SHIPPED | OUTPATIENT
Start: 2024-04-08

## 2024-04-08 RX ORDER — ATORVASTATIN CALCIUM 20 MG/1
20 TABLET, FILM COATED ORAL DAILY
Qty: 90 TABLET | Refills: 3 | Status: SHIPPED | OUTPATIENT
Start: 2024-04-08

## 2024-04-26 ENCOUNTER — TELEMEDICINE (OUTPATIENT)
Dept: PSYCHIATRY | Facility: CLINIC | Age: 69
End: 2024-04-26
Payer: COMMERCIAL

## 2024-04-26 ENCOUNTER — OFFICE VISIT (OUTPATIENT)
Dept: PULMONOLOGY | Facility: CLINIC | Age: 69
End: 2024-04-26
Payer: COMMERCIAL

## 2024-04-26 VITALS
WEIGHT: 150.8 LBS | HEART RATE: 90 BPM | RESPIRATION RATE: 18 BRPM | SYSTOLIC BLOOD PRESSURE: 110 MMHG | HEIGHT: 65 IN | BODY MASS INDEX: 25.12 KG/M2 | OXYGEN SATURATION: 96 % | DIASTOLIC BLOOD PRESSURE: 62 MMHG

## 2024-04-26 DIAGNOSIS — F41.0 PANIC ATTACKS: ICD-10-CM

## 2024-04-26 DIAGNOSIS — J44.9 STAGE 3 SEVERE COPD BY GOLD CLASSIFICATION: Primary | ICD-10-CM

## 2024-04-26 DIAGNOSIS — F41.9 ANXIETY: Primary | ICD-10-CM

## 2024-04-26 DIAGNOSIS — G47.33 OSA (OBSTRUCTIVE SLEEP APNEA): ICD-10-CM

## 2024-04-26 PROCEDURE — 99213 OFFICE O/P EST LOW 20 MIN: CPT | Performed by: INTERNAL MEDICINE

## 2024-04-26 RX ORDER — TIOTROPIUM BROMIDE AND OLODATEROL 3.124; 2.736 UG/1; UG/1
2 SPRAY, METERED RESPIRATORY (INHALATION)
Qty: 4 G | Refills: 5 | Status: SHIPPED | OUTPATIENT
Start: 2024-04-26

## 2024-04-26 RX ORDER — HYDROXYZINE PAMOATE 25 MG/1
25-50 CAPSULE ORAL 3 TIMES DAILY PRN
Qty: 180 CAPSULE | Refills: 2 | Status: SHIPPED | OUTPATIENT
Start: 2024-04-26

## 2024-04-26 RX ORDER — ALBUTEROL SULFATE 90 UG/1
2 AEROSOL, METERED RESPIRATORY (INHALATION) EVERY 4 HOURS PRN
Qty: 8.5 G | Refills: 2 | Status: SHIPPED | OUTPATIENT
Start: 2024-04-26

## 2024-04-26 NOTE — PROGRESS NOTES
"     New Pulmonary Patient Office Visit      Patient Name: Pascual Kaur    Referring Physician: No ref. provider found    Chief Complaint:    Chief Complaint   Patient presents with    Follow-up    Breathing Problem       Subjective: Pascual Kaur is a 68 y.o. male who is here today for follow-up on sleep apnea and COPD.    Past medical history:   RA diagnosed in about .  Was on Humira and recently switched to methotrexate due to renal dysfunction.  He feels like this controls it well, but his wife does not feel like it is done as much as expected.  Lung cancer diagnosed in about  and required wedge resection and chemotherapy.  Had to stop chemotherapy secondary to inability to tolerate it.    Since last visit, he has been using CPAP, but had eye surgery a few weeks ago and has not been able to use it since due to pain from the straps.  He is hoping to restart as he did notice improved energy level with CPAP use.     Reviewed CPAP compliance report over the last 90 days and shows 29% of days used for greater than 4 hours.  AHI is corrected to 1.5.      He has been using the inhaler on a relatively consistent basis and notices no significant changes.  Does not require as needed albuterol much.        Subjective      Review of Systems:   Review of Systems   Constitutional:  Positive for fatigue.   Musculoskeletal:  Positive for arthralgias.   Neurological:  Positive for memory problem.     Social History:   Social History     Socioeconomic History    Marital status:    Tobacco Use    Smoking status: Former     Current packs/day: 0.00     Average packs/day: 3.0 packs/day for 37.0 years (111.0 ttl pk-yrs)     Types: Cigarettes     Start date: 1973     Quit date: 2010     Years since quittin.3    Smokeless tobacco: Never    Tobacco comments:     \"He denies smoking.\"   Vaping Use    Vaping status: Never Used   Substance and Sexual Activity    Alcohol use: No    Drug " use: No    Sexual activity: Defer     Comment: .       Medications:     Current Outpatient Medications:     albuterol sulfate  (90 Base) MCG/ACT inhaler, Inhale 2 puffs by mouth Every 4 (Four) Hours As Needed for Wheezing., Disp: 8.5 g, Rfl: 2    aspirin 81 MG chewable tablet, Chew 1 tablet Daily., Disp: , Rfl:     atorvastatin (LIPITOR) 20 MG tablet, Take 1 tablet by mouth Daily., Disp: 90 tablet, Rfl: 3    Dulaglutide (Trulicity) 0.75 MG/0.5ML solution pen-injector, Inject 0.75 mg under the skin into the appropriate area as directed 1 (One) Time Per Week., Disp: 2 mL, Rfl: 3    FLUoxetine (PROzac) 40 MG capsule, Take 1 capsule by mouth Daily., Disp: 30 capsule, Rfl: 2    fluticasone (Flonase) 50 MCG/ACT nasal spray, Instill 2 sprays into each nostril daily., Disp: 16 g, Rfl: 5    folic acid (FOLVITE) 1 MG tablet, Take 1 tablet by mouth Daily., Disp: 30 tablet, Rfl: 6    glucose blood (Prodigy No Coding Blood Gluc) test strip, Test glucose three times daily for diabetes (Patient taking differently: Test glucose three times daily for diabetes), Disp: 300 each, Rfl: 3    glucose blood test strip, Use as directed 3-4 times a day (Patient taking differently: Use as directed 3-4 times a day), Disp: 100 each, Rfl: 5    Homeopathic Products (ALLERGY MEDICINE PO), Take 1 tablet by mouth Daily., Disp: , Rfl:     hydrOXYzine pamoate (VISTARIL) 25 MG capsule, Take 1-2 capsules by mouth 3 (Three) Times a Day As Needed for Anxiety., Disp: 180 capsule, Rfl: 2    insulin aspart (NovoLOG FlexPen) 100 UNIT/ML solution pen-injector sc pen, Inject as directed by correctional scale, MDD 20 units, Disp: 15 mL, Rfl: 5    insulin detemir (Levemir FlexTouch) 100 UNIT/ML injection, Inject 30 Units under the skin into the appropriate area as directed 2 (Two) Times a Day., Disp: 30 mL, Rfl: 5    methotrexate 2.5 MG tablet, Take 10 tablets by mouth 1 (One) Time Per Week., Disp: 40 tablet, Rfl: 4    omeprazole (priLOSEC) 40 MG  "capsule, Take 1 capsule by mouth Daily., Disp: 90 capsule, Rfl: 3    predniSONE (DELTASONE) 10 MG tablet, Take 1 Tablet by mouth Daily for 2 - 5 days as needed for a flare up, may repeat once a week, Disp: 30 tablet, Rfl: 4    tiotropium bromide-olodaterol (Stiolto Respimat) 2.5-2.5 MCG/ACT aerosol solution inhaler, Inhale 2 puffs by mouth Daily., Disp: 4 g, Rfl: 5    vitamin D (ERGOCALCIFEROL) 1.25 MG (99915 UT) capsule capsule, Take 1 capsule by mouth 1 (One) Time Per Week., Disp: 4 capsule, Rfl: 2    Allergies:   Allergies   Allergen Reactions    Iodine GI Intolerance    Other GI Intolerance and Other (See Comments)     * IVP Dye    * IVP Dye * Nausea/Vomiting       Objective     Physical Exam:  Vital Signs:   Vitals:    04/26/24 1416   BP: 110/62   Pulse: 90   Resp: 18   SpO2: 96%   Weight: 68.4 kg (150 lb 12.8 oz)   Height: 165.1 cm (65\")       Physical Exam  Vitals and nursing note reviewed.   Constitutional:       General: He is not in acute distress.     Appearance: He is well-developed. He is not ill-appearing.   HENT:      Head: Normocephalic and atraumatic.      Comments: Mild swelling around left eye from recent surgery     Right Ear: External ear normal.      Left Ear: External ear normal.      Nose: Nose normal. No congestion or rhinorrhea.      Mouth/Throat:      Mouth: Mucous membranes are moist.      Pharynx: Oropharynx is clear. No oropharyngeal exudate or posterior oropharyngeal erythema.   Eyes:      General: No scleral icterus.        Right eye: No discharge.         Left eye: No discharge.      Extraocular Movements: Extraocular movements intact.      Conjunctiva/sclera: Conjunctivae normal.      Comments: Significant decreased vision which is his baseline   Neck:      Trachea: No tracheal deviation.   Cardiovascular:      Rate and Rhythm: Normal rate and regular rhythm.      Heart sounds: No murmur heard.  Pulmonary:      Effort: No respiratory distress.      Breath sounds: No wheezing or " "rhonchi.   Abdominal:      General: There is no distension.      Palpations: Abdomen is soft.   Musculoskeletal:         General: No tenderness. Normal range of motion.      Cervical back: Normal range of motion and neck supple.      Right lower leg: No edema.      Left lower leg: No edema.   Skin:     General: Skin is warm and dry.      Findings: No rash.   Neurological:      Mental Status: He is alert and oriented to person, place, and time.      Coordination: Coordination normal.      Gait: Gait normal.   Psychiatric:         Mood and Affect: Mood normal.         Behavior: Behavior normal.         Judgment: Judgment normal.       Mallampati Score: III (soft and hard palate and base of uvula visible)    Results Review:   Labs: Reviewed.  Lab Results   Component Value Date    WBC 7.43 09/06/2023    HGB 12.7 (L) 09/06/2023    HCT 36.9 (L) 09/06/2023    MCV 89.8 09/06/2023     09/06/2023   Absolute eosinophils 0.37, eosinophils 5%    Lab Results   Component Value Date    GLUCOSE 159 (H) 09/06/2023    BUN 26 (H) 09/06/2023    CREATININE 1.34 (H) 09/06/2023    EGFRRESULT 42 (L) 07/28/2023    EGFR 58.1 (L) 09/06/2023    BCR 19.4 09/06/2023    K 4.7 09/06/2023    CO2 28.9 09/06/2023    CALCIUM 9.1 09/06/2023    PROTENTOTREF 7.0 07/28/2023    ALBUMIN 3.9 09/06/2023    BILITOT 0.3 09/06/2023    AST 34 09/06/2023    ALT 57 (H) 09/06/2023     Lab Results   Component Value Date    TSH 1.380 07/28/2023         No results found for: \"CBCDIF\", \"CMP\"     Micro: As of April 26, 2024   No results found for: \"RESPCX\"  No results found for: \"BLOODCX\"  Lab Results   Component Value Date    URINECX Final report 11/06/2020     No results found for: \"MRSACX\"  No results found for: \"MRSAPCR\"  No results found for: \"URCX\"  No components found for: \"LOWRESPCF\"  No results found for: \"THROATCX\"  No results found for: \"CULTURES\"  No components found for: \"STREPBCX\"  No results found for: \"STREPPNEUAG\"  No results found for: " "\"LEGIONELLA\"  No results found for: \"MYCOPLASCX\"  No results found for: \"GCCX\"  No results found for: \"WOUNDCX\"  No results found for: \"BODYFLDCX\"    ABG: No results found for: \"PHART\", \"RCS5JEQ\", \"PO2ART\", \"HGBBG\", \"O3USQVCN\", \"CFIO2\", \"FCOHB\", \"CARBOXYHGB\", \"FMETHB\"    Echo:     Radiology Scans:   Images reviewed personally.     March 2023 chest x-ray  CLINICAL INDICATION:    Weak/Dizzy/AMS triage protocol     EXAMINATION TECHNIQUE:   XR CHEST 1 VW-     COMPARISON:  Radiographs 05/03/2022.     FINDINGS:  Multiple surgical clips in the right mid chest with the pulmonary  architectural distortion in the right hilar region, likely postoperative  sequela. Cardiac and mediastinal contours are within normal limits  except aortic atherosclerosis. Emphysema. No dense consolidation. No  pneumothorax or pleural effusion. Postsurgical changes in the right mid  thoracic cage.     IMPRESSION:  No acute cardiopulmonary findings. Chronic changes as above.     Images personally reviewed, interpreted and dictated by HANNA Gore.                This report was signed and finalized on 3/16/2023 2:18 PM by HANNA Gore.      2015 EXAMINATION: RC CHEST WO CONTRAST-      INDICATION: Lung cancer.       TECHNIQUE: Multiple axial CT imaging was obtained of the chest without  the administration of intravenous contrast.     The radiation dose reduction device was turned on for each scan per the  ALARA (As Low as Reasonably Achievable) protocol.     COMPARISON: 6/19/2014.     FINDINGS: The thyroid is homogeneous in appearance. No mediastinal mass  or lymphadenopathy. Vascular calcifications are identified. Coronary  artery calcification is seen. No pericardial effusion. The cardiac  chambers are within normal limits. The lung parenchyma reveals some  scarring identified within the right upper lobe. Post surgical changes  are identified with staple lines. There is no evidence of recurrence.  There is no parenchymal " consolidation, pulmonary mass or nodule. Minimal  scarring at the right lung base. No pleural effusion or pneumothorax.   Degenerative change is seen within the spine. The chest is essentially  stable and unchanged when compared to the prior study. Visualized  portions the upper abdomen are grossly unremarkable in appearance.     IMPRESSION- Stable scarring within the right upper lung field. No  evidence of recurrence or metastatic disease.     DICTATED:     06/25/2015  EDITED:          06/25/2015             Reading Radiologist- JU JAMIL       Releasing Radiologist- JU JAMIL       Released Date Time- 06/26/15 1344       Oliverio GREEN    November 2023 Home sleep study showed sleep apnea with AHI of 14.3/h.    PFT IMPRESSION:   December 2023 PFT showed FEV1 39%, FEV1/FVC 54.  No significant bronchodilator responsiveness.  DL/VA 93%.  Unable to perform lung volume maneuvers.  Assessment / Plan      Assessment/Plan:    1. Stage 3 severe COPD by GOLD classification  Advised him to consistently use Stiolto daily and as needed albuterol.  Reviewed PFTs from last clinic visit, but is unable to use inhaled corticosteroid secondary to concern over glaucoma and his significant visual impairment at baseline.  Discussed risk, benefits and possible side effects of medication.  Advised to rinse his mouth out after each use  - albuterol sulfate  (90 Base) MCG/ACT inhaler; Inhale 2 puffs by mouth Every 4 (Four) Hours As Needed for Wheezing.  Dispense: 8.5 g; Refill: 2  - tiotropium bromide-olodaterol (Stiolto Respimat) 2.5-2.5 MCG/ACT aerosol solution inhaler; Inhale 2 puffs by mouth Daily.  Dispense: 4 g; Refill: 5    2. FERCHO (obstructive sleep apnea)  Encouraged more consistent use of CPAP once he is completely healed from surgery.  I have advised he and his wife via text to ensure that they let the [employer] company know about recent surgery and that is why he is not currently compliant.   Discussed the risk of CPAP being taken away for noncompliance and making this much more difficult to ever get CPAP in the future.        Follow Up:   Return in about 6 months (around 10/26/2024).    Samantha Cuevas MD  Pulmonary/Critical Care Physician   [facility]      Please note that portions of this note may have been completed with a voice recognition program. Efforts were made to edit the dictations, but occasionally words are mistranscribed.

## 2024-04-26 NOTE — PROGRESS NOTES
This provider is located at [facility], [address], [address] in [place] [address], using a secure [employer] Video Visit through [employer]. Patient is being seen remotely via telehealth at their home address in [place] [address], and stated they are in a secure environment for this session. The patient's condition being diagnosed/treated is appropriate for telemedicine. The provider identified herself as well as her credentials. The patient, and/or patients guardian, consent to be seen remotely, and when consent is given they understand that the consent allows for patient identifiable information to be sent to a third party as needed. They may refuse to be seen remotely at any time. The electronic data is encrypted and password protected, and the patient and/or guardian has been advised of the potential risks to privacy not withstanding such measures.     You have chosen to receive care through a telehealth visit.  Do you consent to use a video/audio connection for your medical care today? Yes    Subjective   Pascual Kaur is a 68 y.o. male who presents today for follow up    Chief Complaint:  anxiety    History of Present Illness:   History of Present Illness  Pascual Kaur presents today for medication management follow-up via MyChart video visit.  His wife Yandy is also present for collateral information. Has history of proliferative diabetic retinopathy with macular edema to both eyes. Says that he recently had routine appointment for eye injections, vision has not improved this time as it had in the past.  Continues to work regular schedule and says that he has done well with managing symptoms. Currently taking Prozac 40 mg daily and hydroxyzine pamoate 25 mg 3 times daily as needed.  He does feel that severity and frequency of anxiety/panic episodes have decreased since initiating current medication.  Hydroxyzine has been helpful in decreasing  severity of panic episodes, but only minimally effective when symptoms are more severe.  Denies any current symptoms associated with depression.  Has not been able to wear CPAP over the last couple weeks since eye treatments due to facial tenderness.  Says that he has been able to use other coping techniques to help with minimizing anxiety at night that has been beneficial.  Denies any issues with appetite. Denies SI/HI. PHQ-9 total score: 0, ANDREW-7 total Score: 2.    Previous Psych Meds: Lexapro (ineffective)     The following portions of the patient's history were reviewed and updated as appropriate: allergies, current medications, past family history, past medical history, past social history, past surgical history and problem list.    Past Medical History:   Diagnosis Date    Bronchitis     Cataract     Cataract surgery 2 years ago in both eyes    COPD (chronic obstructive pulmonary disease)     Diabetes mellitus type I     Elevated liver enzymes     Chronic elevated liver enzymes with history of elevated alkaline phosphate as well as ALT and AST.    Fatigue     Generalized fatigue, nondescript, not new, not better with exercise, not worse with exercise, not better with rest    History of chemotherapy     Received 1 course of adjuvant carboplatin and Taxotere. Then we started carbo.Taxol off a 3 on, 1 off weekly regimen but he did not tolerate either of those and subsequentlly refused further adjuvant therapy. On follow-up scanning since that time.    History of CT scan of chest 06/19/2014    CT of chest noncontrast showed no evidence of recurrence. There was stable diffuse scarring in the right middle and upper lung consistent with postoperative changes and stable obstructive emphysematous changes.    History of MRI     MRI of brain negative    Impaired functional mobility, balance, gait, and endurance     Kidney stone     Lung cancer 05/2011    Stage IIA, T2N1, non-small cell lung cancer, status post wedge  "resection of a 3 cm primary, level 10 node positive, preop PET negative, and MRI of brain negative lung cancer. Ineligible for our MORAB trial due to the wedge resection.  Diagnosis was in May 2011. - No recurrence.    Pneumonia     As a small child    Sinusitis     Stress     regarding his occupation    Type 2 diabetes mellitus with hyperglycemia, with long-term current use of insulin 2021    Visual impairment     Diabetic retinopathy     Social History     Socioeconomic History    Marital status:    Tobacco Use    Smoking status: Former     Current packs/day: 0.00     Average packs/day: 3.0 packs/day for 37.0 years (111.0 ttl pk-yrs)     Types: Cigarettes     Start date: 1973     Quit date: 2010     Years since quittin.3    Smokeless tobacco: Never    Tobacco comments:     \"He denies smoking.\"   Vaping Use    Vaping status: Never Used   Substance and Sexual Activity    Alcohol use: No    Drug use: No    Sexual activity: Defer     Comment: .     Family History   Problem Relation Age of Onset    Diabetes Mother     Hashimoto's thyroiditis Sister      Past Surgical History:   Procedure Laterality Date    CATARACT EXTRACTION  2021    CATARACT EXTRACTION  2021    CHOLECYSTECTOMY      EYE SURGERY Right     EYE SURGERY Left     KIDNEY STONE SURGERY      LUNG CANCER SURGERY      TONSILLECTOMY      VITRECTOMY Right     Right eye     Patient Active Problem List   Diagnosis    Chronic obstructive pulmonary disease    Malignant neoplasm of upper lobe of right lung    Type 2 diabetes mellitus with hyperglycemia, with long-term current use of insulin    Rheumatoid arthritis involving multiple sites with positive rheumatoid factor    Reactive depression    Dizziness     Allergies   Allergen Reactions    Iodine GI Intolerance    Other GI Intolerance and Other (See Comments)     * IVP Dye    * IVP Dye * Nausea/Vomiting        Current Medications:   Current Outpatient Medications "   Medication Sig Dispense Refill    hydrOXYzine pamoate (VISTARIL) 25 MG capsule Take 1-2 capsules by mouth 3 (Three) Times a Day As Needed for Anxiety. 180 capsule 2    albuterol sulfate  (90 Base) MCG/ACT inhaler Inhale 2 puffs by mouth Every 4 (Four) Hours As Needed for Wheezing. 8.5 g 2    aspirin 81 MG chewable tablet Chew 1 tablet Daily.      atorvastatin (LIPITOR) 20 MG tablet Take 1 tablet by mouth Daily. 90 tablet 3    Dulaglutide (Trulicity) 0.75 MG/0.5ML solution pen-injector Inject 0.75 mg under the skin into the appropriate area as directed 1 (One) Time Per Week. 2 mL 3    FLUoxetine (PROzac) 40 MG capsule Take 1 capsule by mouth Daily. 30 capsule 2    fluticasone (Flonase) 50 MCG/ACT nasal spray Instill 2 sprays into each nostril daily. 16 g 5    folic acid (FOLVITE) 1 MG tablet Take 1 tablet by mouth Daily. 30 tablet 6    glucose blood (Prodigy No Coding Blood Gluc) test strip Test glucose three times daily for diabetes (Patient taking differently: Test glucose three times daily for diabetes) 300 each 3    glucose blood test strip Use as directed 3-4 times a day (Patient taking differently: Use as directed 3-4 times a day) 100 each 5    Homeopathic Products (ALLERGY MEDICINE PO) Take 1 tablet by mouth Daily.      insulin aspart (NovoLOG FlexPen) 100 UNIT/ML solution pen-injector sc pen Inject as directed by correctional scale, MDD 20 units 15 mL 5    insulin detemir (Levemir FlexTouch) 100 UNIT/ML injection Inject 30 Units under the skin into the appropriate area as directed 2 (Two) Times a Day. 30 mL 5    methotrexate 2.5 MG tablet Take 10 tablets by mouth 1 (One) Time Per Week. 40 tablet 4    methotrexate 2.5 MG tablet Take 10 tablets by mouth 1 (One) Time Per Week. 40 tablet 4    omeprazole (priLOSEC) 40 MG capsule Take 1 capsule by mouth Daily. 90 capsule 3    predniSONE (DELTASONE) 10 MG tablet Take 1 Tablet by mouth Daily for 2 - 5 days as needed for a flare up, may repeat once a week  (Patient taking differently: Reported prn) 30 tablet 3    predniSONE (DELTASONE) 10 MG tablet Take 1 Tablet by mouth Daily for 2 - 5 days as needed for a flare up, may repeat once a week 30 tablet 4    tiotropium bromide-olodaterol (Stiolto Respimat) 2.5-2.5 MCG/ACT aerosol solution inhaler Inhale 2 puffs by mouth Daily. 4 g 5    vitamin D (ERGOCALCIFEROL) 1.25 MG (53207 UT) capsule capsule Take 1 capsule by mouth 1 (One) Time Per Week. 4 capsule 2    Vitamin D, Ergocalciferol, 20861 units capsule Take 1 capsule by mouth 1 (One) Time Per Week. 4 capsule 3     No current facility-administered medications for this visit.     Review of Systems   Constitutional:  Negative for activity change, appetite change, unexpected weight gain and unexpected weight loss.   Respiratory:  Negative for shortness of breath.    Cardiovascular:  Negative for chest pain.   Psychiatric/Behavioral:  Positive for sleep disturbance. Negative for suicidal ideas and depressed mood. The patient is nervous/anxious.      Physical Exam  Constitutional:       General: He is not in acute distress.     Appearance: Normal appearance.   Neurological:      Mental Status: He is alert.     Vitals:   The patient [facility].  Unable to obtain vital signs due to nature of remote visit.    Mental Status Exam:   Hygiene:    appears good  Cooperation:  Cooperative  Eye Contact:   ELVI r/t video visit  Psychomotor Behavior:  Appropriate  Affect:  Full range and Appropriate  Mood: normal  Hopelessness: Denies  Speech:  Normal  Thought Process:  Goal directed and Linear  Thought Content:  Mood congruent  Suicidal:  None  Homicidal:  None  Hallucinations:  None  Delusion:  None  Memory:  Intact  Orientation:  Person, Place, Time, and Situation  Reliability:  good  Insight:  Good  Judgement:  Good  Impulse Control:  Good    Assessment & Plan   Problems Addressed this Visit    None  Visit Diagnoses       Anxiety    -   Primary    Relevant Medications    hydrOXYzine pamoate (VISTARIL) 25 MG capsule    Panic attacks        Relevant Medications    hydrOXYzine pamoate (VISTARIL) 25 MG capsule          Diagnoses         Codes Comments    Anxiety    -  Primary ICD-10-CM: F41.9  ICD-9-CM: 300.00     Panic attacks     ICD-10-CM: F41.0  ICD-9-CM: 300.01           Visit Diagnoses:    ICD-10-CM ICD-9-CM   1. Anxiety  F41.9 300.00   2. Panic attacks  F41.0 300.01       Pascual presents today via [employer] video visit for medication management follow-up.  He is accompanied by significant other, Yandy for collateral information.  Pascual and Les feel that current medication regimen works well to decrease frequency and severity of anxiety/panic episodes.  They do voice that he could possibly benefit from an increase in hydroxyzine as medication does decrease panic symptoms, but does not work as well when symptoms are more severe.  Will increase hydroxyzine pamoate from 25 mg 3 times daily to 25 to 50 mg 3 times daily as needed. Will continue with Prozac 40 mg daily.  Denies any adverse effects of medication regimen.    -Continue Prozac 40 mg daily for anxiety/panic episodes  -Increase Hydroxyzine Pamoate from 25 mg 3 times daily to 25-50 mg 3 times daily as needed for anxiety/panic episodes.    -Reviewed previous available documentation and most recent available labs.   MAKENZIE reviewed and is appropriate.     GOALS:  Short Term Goals: Patient will be compliant with medication, and patient will have no significant medication related side effects.  Patient will be engaged in psychotherapy as indicated.  Patient will report subjective improvement of symptoms.  Long term goals: To stabilize mood and treat/improve subjective symptoms, the patient will stay out of the hospital, the patient will be at an optimal level of functioning, and the patient will take all medications as prescribed.  The patient/guardian verbalized understanding and agreement with  goals that were mutually set.    TREATMENT PLAN: Continue supportive psychotherapy efforts and medications as indicated for patient's diagnosis.  Pharmacological and Non-Pharmacological treatment options discussed during today's visit. Patient/Guardian acknowledged and verbally consented with current treatment plan and was educated on the importance of compliance with treatment and follow-up appointments.      MEDICATION ISSUES:  Discussed medication options and treatment plan of prescribed medication as well as the risks, benefits, any black box warnings, and side effects including potential falls, possible impaired driving, and metabolic adversities among others. Patient is agreeable to call the office with any worsening of symptoms or onset of side effects, or if any concerns or questions arise.  The contact information for the office is made available to the patient. Patient is agreeable to call 911 or go to the nearest ER should they begin having any SI/HI, or if any urgent concerns arise. No medication side effects or related complaints today.     MEDS ORDERED DURING VISIT:  New Medications Ordered This Visit   Medications    hydrOXYzine pamoate (VISTARIL) 25 MG capsule     Sig: Take 1-2 capsules by mouth 3 (Three) Times a Day As Needed for Anxiety.     Dispense:  180 capsule     Refill:  2       FOLLOW UP:  Return in about 3 months (around 7/26/2024) for Recheck, Video visit.             This document has been electronically signed by TIMOTHY Garnett  April 26, 2024 09:12 EDT    Please note that portions of this note were completed with a voice recognition program. Efforts were made to edit dictation, but occasionally words are mistranscribed.

## 2024-07-23 ENCOUNTER — OFFICE VISIT (OUTPATIENT)
Dept: ENDOCRINOLOGY | Facility: CLINIC | Age: 69
End: 2024-07-23
Payer: COMMERCIAL

## 2024-07-23 VITALS
BODY MASS INDEX: 25.16 KG/M2 | HEART RATE: 100 BPM | SYSTOLIC BLOOD PRESSURE: 110 MMHG | DIASTOLIC BLOOD PRESSURE: 60 MMHG | HEIGHT: 65 IN | WEIGHT: 151 LBS | OXYGEN SATURATION: 93 %

## 2024-07-23 DIAGNOSIS — E78.5 HYPERLIPIDEMIA, UNSPECIFIED HYPERLIPIDEMIA TYPE: ICD-10-CM

## 2024-07-23 DIAGNOSIS — Z79.4 TYPE 2 DIABETES MELLITUS WITH HYPERGLYCEMIA, WITH LONG-TERM CURRENT USE OF INSULIN: Primary | ICD-10-CM

## 2024-07-23 DIAGNOSIS — E11.65 TYPE 2 DIABETES MELLITUS WITH HYPERGLYCEMIA, WITH LONG-TERM CURRENT USE OF INSULIN: Primary | ICD-10-CM

## 2024-07-23 LAB
EXPIRATION DATE: ABNORMAL
EXPIRATION DATE: ABNORMAL
GLUCOSE BLDC GLUCOMTR-MCNC: 308 MG/DL (ref 70–130)
HBA1C MFR BLD: 7.2 % (ref 4.5–5.7)
Lab: ABNORMAL
Lab: ABNORMAL

## 2024-07-23 PROCEDURE — 80061 LIPID PANEL: CPT | Performed by: INTERNAL MEDICINE

## 2024-07-23 PROCEDURE — 82043 UR ALBUMIN QUANTITATIVE: CPT | Performed by: INTERNAL MEDICINE

## 2024-07-23 PROCEDURE — 82570 ASSAY OF URINE CREATININE: CPT | Performed by: INTERNAL MEDICINE

## 2024-07-23 PROCEDURE — 82947 ASSAY GLUCOSE BLOOD QUANT: CPT | Performed by: INTERNAL MEDICINE

## 2024-07-23 PROCEDURE — 83036 HEMOGLOBIN GLYCOSYLATED A1C: CPT | Performed by: INTERNAL MEDICINE

## 2024-07-23 PROCEDURE — 80053 COMPREHEN METABOLIC PANEL: CPT | Performed by: INTERNAL MEDICINE

## 2024-07-23 PROCEDURE — 99214 OFFICE O/P EST MOD 30 MIN: CPT | Performed by: INTERNAL MEDICINE

## 2024-07-23 RX ORDER — BLOOD-GLUCOSE SENSOR
1 EACH MISCELLANEOUS
Qty: 3 EACH | Refills: 11 | Status: SHIPPED | OUTPATIENT
Start: 2024-07-23

## 2024-07-23 RX ORDER — DULAGLUTIDE 0.75 MG/.5ML
0.75 INJECTION, SOLUTION SUBCUTANEOUS WEEKLY
Qty: 2 ML | Refills: 5 | Status: SHIPPED | OUTPATIENT
Start: 2024-07-23

## 2024-07-23 RX ORDER — INSULIN DETEMIR 100 [IU]/ML
30 INJECTION, SOLUTION SUBCUTANEOUS 2 TIMES DAILY
Qty: 30 ML | Refills: 5 | Status: SHIPPED | OUTPATIENT
Start: 2024-07-23

## 2024-07-23 NOTE — PROGRESS NOTES
"Chief Complaint   Patient presents with    Diabetes        HPI   Pascual Kaur is a 69 y.o. male had concerns including Diabetes.      Patient was last seen in February 2024.  He did have interval steroid injections due to arthritis.  Minimal glucose data is available for review.  Glucose is elevated during office visit, patient reports he had eaten immediately prior to visit.    Current diabetes medications include the following:  Trulicity 0.75 mg weekly  Levemir 30 units twice daily  NovoLog per correctional scale, patient reports this is used infrequently.    Patient continues on atorvastatin 20 mg daily for hyperlipidemia.    The following portions of the patient's history were reviewed and updated as appropriate: allergies, current medications, and past social history.    Review of Systems   Gastrointestinal:  Negative for nausea and vomiting.      /60 (BP Location: Right arm, Patient Position: Sitting, Cuff Size: Adult)   Pulse 100   Ht 165.1 cm (65\")   Wt 68.5 kg (151 lb)   SpO2 93%   BMI 25.13 kg/m²      Physical Exam      Constitutional:  well developed; well nourished  no acute distress   ENT/Thyroid: not examined   Eyes: Conjunctiva: clear   Respiratory:  breathing is unlabored  clear to auscultation bilaterally   Cardiovascular:  regular rate and rhythm   Chest:  Not performed.   Abdomen: Not performed.   : Not performed.   Musculoskeletal: Not performed   Skin: not performed.   Neuro: mental status, speech normal   Psych: mood and affect are within normal limits       Labs/Imaging  Glucose:  Lab Results   Component Value Date    POCGLU 308 (A) 07/23/2024     HbA1c:  Lab Results   Component Value Date    HGBA1C 7.2 (A) 07/23/2024    HGBA1C 8.4 (A) 02/13/2024     Microalbumin:  Lab Results   Component Value Date    MICROALBUR 86.1 08/09/2023     Lipid Panel:  Lab Results   Component Value Date    CHOL 127 08/09/2023    TRIG 190 (H) 08/09/2023    HDL 32 (L) 08/09/2023    LDL 63 " 08/09/2023    VLDL 32 08/09/2023    LDLHDL 1.78 08/09/2023     CMP:  Lab Results   Component Value Date    BUN 26 (H) 09/06/2023    CREATININE 1.34 (H) 09/06/2023    EGFRIFNONA 84 11/30/2021    EGFRIFAFRI 107 06/29/2020    BCR 19.4 09/06/2023     09/06/2023    K 4.7 09/06/2023    CO2 28.9 09/06/2023    CALCIUM 9.1 09/06/2023    PROTENTOTREF 7.0 07/28/2023    ALBUMIN 3.9 09/06/2023    LABGLOBREF 2.5 07/28/2023    LABIL2 1.8 07/28/2023    BILITOT 0.3 09/06/2023    ALKPHOS 207 (H) 09/06/2023    AST 34 09/06/2023    ALT 57 (H) 09/06/2023        Diagnoses and all orders for this visit:    1. Type 2 diabetes mellitus with hyperglycemia, with long-term current use of insulin (Primary)  -     POC Glucose, Blood  -     POC Glycosylated Hemoglobin (Hb A1C)  -     Microalbumin / Creatinine Urine Ratio - Urine, Clean Catch; Future  -     Comprehensive Metabolic Panel; Future  -     Lipid Panel; Future  Hemoglobin A1c is 7.2%.  Patient is hyperglycemic during office visit.  Discussed goals for glycemic control and recommendations for glucose monitoring.  Patient reports he would be willing to try CGM.  Sample was placed during visit today.  Patient will continue Levemir 30 units twice daily.  Discussed this will need to ultimately be changed to an alternative given discontinuation by .  Patient will continue Trulicity 0.75 mg weekly  Plan to update labs today    2. Hyperlipidemia, unspecified hyperlipidemia type  Updating lipid panel today, patient will continue atorvastatin    Other orders  -     Continuous Glucose Sensor (FreeStyle Anthony 3 Sensor) misc; Use 1 each Every 14 (Fourteen) Days.  Dispense: 3 each; Refill: 11       Return in about 4 months (around 11/23/2024) for Next scheduled follow up. The patient was instructed to contact the clinic with any interval questions or concerns.    Electronically signed by: Maryan Jarquin MD   Endocrinologist    Dictated Utilizing Dragon Dictation

## 2024-07-24 LAB
ALBUMIN SERPL-MCNC: 3.7 G/DL (ref 3.5–5.2)
ALBUMIN UR-MCNC: 91.7 MG/DL
ALBUMIN/GLOB SERPL: 1.4 G/DL
ALP SERPL-CCNC: 257 U/L (ref 39–117)
ALT SERPL W P-5'-P-CCNC: 14 U/L (ref 1–41)
ANION GAP SERPL CALCULATED.3IONS-SCNC: 9 MMOL/L (ref 5–15)
AST SERPL-CCNC: 17 U/L (ref 1–40)
BILIRUB SERPL-MCNC: 0.2 MG/DL (ref 0–1.2)
BUN SERPL-MCNC: 17 MG/DL (ref 8–23)
BUN/CREAT SERPL: 14.7 (ref 7–25)
CALCIUM SPEC-SCNC: 9.3 MG/DL (ref 8.6–10.5)
CHLORIDE SERPL-SCNC: 100 MMOL/L (ref 98–107)
CHOLEST SERPL-MCNC: 103 MG/DL (ref 0–200)
CO2 SERPL-SCNC: 29 MMOL/L (ref 22–29)
CREAT SERPL-MCNC: 1.16 MG/DL (ref 0.76–1.27)
CREAT UR-MCNC: 70.8 MG/DL
EGFRCR SERPLBLD CKD-EPI 2021: 68.6 ML/MIN/1.73
GLOBULIN UR ELPH-MCNC: 2.7 GM/DL
GLUCOSE SERPL-MCNC: 214 MG/DL (ref 65–99)
HDLC SERPL-MCNC: 36 MG/DL (ref 40–60)
LDLC SERPL CALC-MCNC: 47 MG/DL (ref 0–100)
LDLC/HDLC SERPL: 1.26 {RATIO}
MICROALBUMIN/CREAT UR: 1295.2 MG/G (ref 0–29)
POTASSIUM SERPL-SCNC: 4.4 MMOL/L (ref 3.5–5.2)
PROT SERPL-MCNC: 6.4 G/DL (ref 6–8.5)
SODIUM SERPL-SCNC: 138 MMOL/L (ref 136–145)
TRIGL SERPL-MCNC: 109 MG/DL (ref 0–150)
VLDLC SERPL-MCNC: 20 MG/DL (ref 5–40)

## 2024-09-03 DIAGNOSIS — F41.9 ANXIETY: ICD-10-CM

## 2024-09-03 DIAGNOSIS — F41.0 PANIC ATTACKS: ICD-10-CM

## 2024-09-03 RX ORDER — FLUOXETINE 40 MG/1
40 CAPSULE ORAL DAILY
Qty: 30 CAPSULE | Refills: 2 | Status: SHIPPED | OUTPATIENT
Start: 2024-09-03

## 2024-10-02 ENCOUNTER — TELEMEDICINE (OUTPATIENT)
Dept: PSYCHIATRY | Facility: CLINIC | Age: 69
End: 2024-10-02
Payer: COMMERCIAL

## 2024-10-02 DIAGNOSIS — F41.0 PANIC ATTACKS: ICD-10-CM

## 2024-10-02 DIAGNOSIS — F41.9 ANXIETY: Primary | ICD-10-CM

## 2024-10-02 PROCEDURE — 99214 OFFICE O/P EST MOD 30 MIN: CPT | Performed by: NURSE PRACTITIONER

## 2024-10-02 RX ORDER — MIRTAZAPINE 7.5 MG/1
7.5 TABLET, FILM COATED ORAL NIGHTLY
Qty: 30 TABLET | Refills: 1 | Status: SHIPPED | OUTPATIENT
Start: 2024-10-02

## 2024-10-02 NOTE — PROGRESS NOTES
This provider is located at [facility], [address], [address] in [place] [address], using a secure [employer] Video Visit through [employer]. Patient is being seen remotely via telehealth at their home address in [place] [address], and stated they are in a secure environment for this session. The patient's condition being diagnosed/treated is appropriate for telemedicine. The provider identified herself as well as her credentials. The patient, and/or patients guardian, consent to be seen remotely, and when consent is given they understand that the consent allows for patient identifiable information to be sent to a third party as needed. They may refuse to be seen remotely at any time. The electronic data is encrypted and password protected, and the patient and/or guardian has been advised of the potential risks to privacy not withstanding such measures.     You have chosen to receive care through a telehealth visit.  Do you consent to use a video/audio connection for your medical care today? Yes    Subjective   Pascual Kaur is a 69 y.o. male who presents today for follow up    Chief Complaint:  anxiety    History of Present Illness:   History of Present Illness  Pascual Kaur presents via MyChart video visit for medication management follow up. His last follow up visit was on 4/26/24. Says that anxiety has decreased in severity with current medications regimen and feels that he is able to think before reacting. Symptoms are improved during the day, continues to have more anxiety at night. Sleep is broken, unable to quantify the number of hours he sleeps each night. Says that he typically wakes up during the night and in the mornings feeling anxious. Currently taking Prozac 40 mg daily and hydroxyzine pamoate 25 mg 3 times daily as needed. Panic attacks have decreased in frequency and severity over the last few months. Denies issues with appetite.   Denies SI/HI.  PHQ-9 total score: 2, ANDREW-7 total score: 4.    Previous Psych Meds: Lexapro (ineffective)     The following portions of the patient's history were reviewed and updated as appropriate: allergies, current medications, past family history, past medical history, past social history, past surgical history and problem list.    Past Medical History:   Diagnosis Date    Bronchitis     Cataract     Cataract surgery 2 years ago in both eyes    COPD (chronic obstructive pulmonary disease)     Diabetes mellitus type I     Elevated liver enzymes     Chronic elevated liver enzymes with history of elevated alkaline phosphate as well as ALT and AST.    Fatigue     Generalized fatigue, nondescript, not new, not better with exercise, not worse with exercise, not better with rest    History of chemotherapy     Received 1 course of adjuvant carboplatin and Taxotere. Then we started carbo.Taxol off a 3 on, 1 off weekly regimen but he did not tolerate either of those and subsequentlly refused further adjuvant therapy. On follow-up scanning since that time.    History of CT scan of chest 06/19/2014    CT of chest noncontrast showed no evidence of recurrence. There was stable diffuse scarring in the right middle and upper lung consistent with postoperative changes and stable obstructive emphysematous changes.    History of MRI     MRI of brain negative    Impaired functional mobility, balance, gait, and endurance     Kidney stone     Lung cancer 05/2011    Stage IIA, T2N1, non-small cell lung cancer, status post wedge resection of a 3 cm primary, level 10 node positive, preop PET negative, and MRI of brain negative lung cancer. Ineligible for our MORAB trial due to the wedge resection.  Diagnosis was in May 2011. - No recurrence.    Pneumonia     As a small child    Sinusitis     Stress     regarding his occupation    Type 2 diabetes mellitus with hyperglycemia, with long-term current use of insulin 02/04/2021    Visual  "impairment     Diabetic retinopathy     Social History     Socioeconomic History    Marital status:    Tobacco Use    Smoking status: Former     Current packs/day: 0.00     Average packs/day: 3.0 packs/day for 37.0 years (111.0 ttl pk-yrs)     Types: Cigarettes     Start date: 1973     Quit date: 2010     Years since quittin.8    Smokeless tobacco: Never    Tobacco comments:     \"He denies smoking.\"   Vaping Use    Vaping status: Never Used   Substance and Sexual Activity    Alcohol use: No    Drug use: No    Sexual activity: Defer     Comment: .     Family History   Problem Relation Age of Onset    Diabetes Mother     Hashimoto's thyroiditis Sister      Past Surgical History:   Procedure Laterality Date    CATARACT EXTRACTION  2021    CATARACT EXTRACTION  2021    CHOLECYSTECTOMY      EYE SURGERY Right     EYE SURGERY Left     KIDNEY STONE SURGERY      LUNG CANCER SURGERY      TONSILLECTOMY      VITRECTOMY Right     Right eye     Patient Active Problem List   Diagnosis    Chronic obstructive pulmonary disease    Malignant neoplasm of upper lobe of right lung    Type 2 diabetes mellitus with hyperglycemia, with long-term current use of insulin    Rheumatoid arthritis involving multiple sites with positive rheumatoid factor    Reactive depression    Dizziness     Allergies   Allergen Reactions    Iodine GI Intolerance    Other GI Intolerance and Other (See Comments)     * IVP Dye    * IVP Dye * Nausea/Vomiting        Current Medications:   Current Outpatient Medications   Medication Sig Dispense Refill    albuterol sulfate  (90 Base) MCG/ACT inhaler Inhale 2 puffs by mouth Every 4 (Four) Hours As Needed for Wheezing. 8.5 g 2    aspirin 81 MG chewable tablet Chew 1 tablet Daily.      atorvastatin (LIPITOR) 20 MG tablet Take 1 tablet by mouth Daily. 90 tablet 3    Continuous Glucose Sensor (FreeStyle Anthony 3 Sensor) misc Use 1 each Every 14 (Fourteen) Days. 3 each 11    " Dulaglutide (Trulicity) 0.75 MG/0.5ML solution pen-injector Inject 0.75 mg under the skin into the appropriate area as directed 1 (One) Time Per Week. 2 mL 5    FLUoxetine (PROzac) 40 MG capsule Take 1 capsule by mouth Daily. 30 capsule 2    fluticasone (Flonase) 50 MCG/ACT nasal spray Instill 2 sprays into each nostril daily. 16 g 5    folic acid (FOLVITE) 1 MG tablet Take 1 tablet by mouth Daily. 30 tablet 3    folic acid (FOLVITE) 1 MG tablet Take 1 tablet by mouth Daily. 30 tablet 3    glucose blood (Prodigy No Coding Blood Gluc) test strip Test glucose three times daily for diabetes (Patient taking differently: Test glucose three times daily for diabetes) 300 each 3    glucose blood test strip Use as directed 3-4 times a day (Patient taking differently: Use as directed 3-4 times a day) 100 each 5    Homeopathic Products (ALLERGY MEDICINE PO) Take 1 tablet by mouth Daily.      hydroxychloroquine (Plaquenil) 200 MG tablet Take 1 tablet by mouth Daily. 30 tablet 3    hydroxychloroquine (Plaquenil) 200 MG tablet Take 1 tablet by mouth Daily. 30 tablet 3    hydrOXYzine pamoate (VISTARIL) 25 MG capsule Take 1-2 capsules by mouth 3 (Three) Times a Day As Needed for Anxiety. 180 capsule 2    insulin aspart (NovoLOG FlexPen) 100 UNIT/ML solution pen-injector sc pen Inject as directed by correctional scale, MDD 20 units 15 mL 5    insulin detemir (Levemir FlexTouch) 100 UNIT/ML injection Inject 30 Units under the skin into the appropriate area as directed 2 (Two) Times a Day. 30 mL 5    methotrexate 2.5 MG tablet Take 10 tablets by mouth 1 (One) Time Per Week. 40 tablet 4    methotrexate 2.5 MG tablet Take 10 tablets by mouth 1 (One) Time Per Week. 40 tablet 4    mirtazapine (REMERON) 7.5 MG tablet Take 1 tablet by mouth Every Night. 30 tablet 1    omeprazole (priLOSEC) 40 MG capsule Take 1 capsule by mouth Daily. 90 capsule 3    predniSONE (DELTASONE) 10 MG tablet Take 1 Tablet by mouth Daily for 2 - 5 days as needed  for a flare up, may repeat once a week 30 tablet 4    tiotropium bromide-olodaterol (Stiolto Respimat) 2.5-2.5 MCG/ACT aerosol solution inhaler Inhale 2 puffs by mouth Daily. 4 g 5    vitamin D (ERGOCALCIFEROL) 1.25 MG (55264 UT) capsule capsule Take 1 capsule by mouth 1 (One) Time Per Week. 4 capsule 3    vitamin D (ERGOCALCIFEROL) 1.25 MG (60159 UT) capsule capsule Take 1 capsule by mouth 1 (One) Time Per Week. 4 capsule 3     No current facility-administered medications for this visit.     Review of Systems   Constitutional:  Negative for activity change, appetite change, unexpected weight gain and unexpected weight loss.   Respiratory:  Negative for shortness of breath.    Cardiovascular:  Negative for chest pain.   Psychiatric/Behavioral:  Positive for sleep disturbance. Negative for suicidal ideas and depressed mood. The patient is nervous/anxious.      Physical Exam  Constitutional:       General: He is not in acute distress.     Appearance: Normal appearance.   Neurological:      Mental Status: He is alert.       Vitals:   The patient was seen remotely today via a [facility] through [facility].  Unable to obtain vital signs due to nature of remote visit.    Mental Status Exam:   Hygiene:    appears good  Cooperation:  Cooperative  Eye Contact:   [facility] r/t video visit  Psychomotor Behavior:  Appropriate  Affect:  Full range and Appropriate  Mood: normal  Hopelessness: Denies  Speech:  Normal  Thought Process:  Goal directed and Linear  Thought Content:  Mood congruent  Suicidal:  None  Homicidal:  None  Hallucinations:  None  Delusion:  None  Memory:  Intact  Orientation:  Person, Place, Time, and Situation  Reliability:  good  Insight:  Good  Judgement:  Good  Impulse Control:  Good    Assessment & Plan   Problems Addressed this Visit    None  Visit Diagnoses       Anxiety    -  Primary    Relevant Medications    mirtazapine (REMERON) 7.5 MG tablet    Panic attacks        Relevant Medications    mirtazapine  (REMERON) 7.5 MG tablet          Diagnoses         Codes Comments    Anxiety    -  Primary ICD-10-CM: F41.9  ICD-9-CM: 300.00     Panic attacks     ICD-10-CM: F41.0  ICD-9-CM: 300.01           Visit Diagnoses:    ICD-10-CM ICD-9-CM   1. Anxiety  F41.9 300.00   2. Panic attacks  F41.0 300.01     Pascual presents for medication management follow-up via my chart video visit.  Has noticed overall improvement in severity of anxiety along with decreased frequency of panic episodes.  Continues to struggle with anxiety at night and upon waking in the morning.  Has been happy with current medication regimen.  Discussed medication regimen/options, agreeable to initiate mirtazapine 7.5 mg nightly for anxiety that occurs more at night, continue Prozac 40 mg daily and hydroxyzine 25-50 mg 3 times daily. Denies any adverse effects of medication regimen.    -Start Mirtazapine 7.5 mg nightly   -Continue Prozac 40 mg daily for anxiety/panic episodes  -Continue Hydroxyzine Pamoate 25-50 mg 3 times daily as needed for anxiety/panic episodes.    -Reviewed previous available documentation and most recent available labs.   MAKENZIE reviewed and is appropriate.     GOALS:  Short Term Goals: Patient will be compliant with medication, and patient will have no significant medication related side effects.  Patient will be engaged in psychotherapy as indicated.  Patient will report subjective improvement of symptoms.  Long term goals: To stabilize mood and treat/improve subjective symptoms, the patient will stay out of the hospital, the patient will be at an optimal level of functioning, and the patient will take all medications as prescribed.  The patient/guardian verbalized understanding and agreement with goals that were mutually set.    TREATMENT PLAN: Continue supportive psychotherapy efforts and medications as indicated for patient's diagnosis.  Pharmacological and Non-Pharmacological treatment options discussed during today's visit.  Patient/Guardian acknowledged and verbally consented with current treatment plan and was educated on the importance of compliance with treatment and follow-up appointments.      MEDICATION ISSUES:  Discussed medication options and treatment plan of prescribed medication as well as the risks, benefits, any black box warnings, and side effects including potential falls, possible impaired driving, and metabolic adversities among others. Patient is agreeable to call the office with any worsening of symptoms or onset of side effects, or if any concerns or questions arise.  The contact information for the office is made available to the patient. Patient is agreeable to call 911 or go to the nearest ER should they begin having any SI/HI, or if any urgent concerns arise. No medication side effects or related complaints today.     MEDS ORDERED DURING VISIT:  New Medications Ordered This Visit   Medications    mirtazapine (REMERON) 7.5 MG tablet     Sig: Take 1 tablet by mouth Every Night.     Dispense:  30 tablet     Refill:  1       FOLLOW UP:  Return in about 8 weeks (around 11/27/2024) for Recheck, Video visit.             This document has been electronically signed by TIMOTHY Garnett  October 21, 2024 18:33 EDT    Please note that portions of this note were completed with a voice recognition program. Efforts were made to edit dictation, but occasionally words are mistranscribed.

## 2024-10-23 ENCOUNTER — PRIOR AUTHORIZATION (OUTPATIENT)
Dept: ENDOCRINOLOGY | Facility: CLINIC | Age: 69
End: 2024-10-23
Payer: COMMERCIAL

## 2024-10-23 NOTE — TELEPHONE ENCOUNTER
The authorization is valid from 09/23/2024 through 10/23/2025. A letter of explanation will also be mailed to the patient.

## 2024-12-04 ENCOUNTER — TRANSCRIBE ORDERS (OUTPATIENT)
Dept: LAB | Facility: HOSPITAL | Age: 69
End: 2024-12-04
Payer: COMMERCIAL

## 2024-12-04 ENCOUNTER — LAB (OUTPATIENT)
Dept: LAB | Facility: HOSPITAL | Age: 69
End: 2024-12-04
Payer: COMMERCIAL

## 2024-12-04 ENCOUNTER — OFFICE VISIT (OUTPATIENT)
Dept: ENDOCRINOLOGY | Facility: CLINIC | Age: 69
End: 2024-12-04
Payer: COMMERCIAL

## 2024-12-04 VITALS
DIASTOLIC BLOOD PRESSURE: 62 MMHG | BODY MASS INDEX: 24.46 KG/M2 | WEIGHT: 146.8 LBS | HEIGHT: 65 IN | HEART RATE: 101 BPM | SYSTOLIC BLOOD PRESSURE: 112 MMHG

## 2024-12-04 DIAGNOSIS — M79.10 MYALGIA: ICD-10-CM

## 2024-12-04 DIAGNOSIS — M06.09 RHEUMATOID ARTHRITIS OF MULTIPLE SITES WITHOUT RHEUMATOID FACTOR: Primary | ICD-10-CM

## 2024-12-04 DIAGNOSIS — E11.65 POORLY CONTROLLED DIABETES MELLITUS: ICD-10-CM

## 2024-12-04 DIAGNOSIS — M15.0 PRIMARY GENERALIZED HYPERTROPHIC OSTEOARTHROSIS: ICD-10-CM

## 2024-12-04 DIAGNOSIS — Z79.4 TYPE 2 DIABETES MELLITUS WITH HYPERGLYCEMIA, WITH LONG-TERM CURRENT USE OF INSULIN: Primary | ICD-10-CM

## 2024-12-04 DIAGNOSIS — E78.5 HYPERLIPIDEMIA, UNSPECIFIED HYPERLIPIDEMIA TYPE: ICD-10-CM

## 2024-12-04 DIAGNOSIS — E11.65 TYPE 2 DIABETES MELLITUS WITH HYPERGLYCEMIA, WITH LONG-TERM CURRENT USE OF INSULIN: Primary | ICD-10-CM

## 2024-12-04 DIAGNOSIS — M06.09 RHEUMATOID ARTHRITIS OF MULTIPLE SITES WITHOUT RHEUMATOID FACTOR: ICD-10-CM

## 2024-12-04 LAB
ALBUMIN SERPL-MCNC: 4 G/DL (ref 3.5–5.2)
ALBUMIN/GLOB SERPL: 1.3 G/DL
ALP SERPL-CCNC: 190 U/L (ref 39–117)
ALT SERPL W P-5'-P-CCNC: 33 U/L (ref 1–41)
ANION GAP SERPL CALCULATED.3IONS-SCNC: 9.9 MMOL/L (ref 5–15)
AST SERPL-CCNC: 26 U/L (ref 1–40)
BASOPHILS # BLD AUTO: 0.05 10*3/MM3 (ref 0–0.2)
BASOPHILS NFR BLD AUTO: 0.7 % (ref 0–1.5)
BILIRUB SERPL-MCNC: 0.4 MG/DL (ref 0–1.2)
BUN SERPL-MCNC: 26 MG/DL (ref 8–23)
BUN/CREAT SERPL: 14.5 (ref 7–25)
CALCIUM SPEC-SCNC: 9.4 MG/DL (ref 8.6–10.5)
CHLORIDE SERPL-SCNC: 101 MMOL/L (ref 98–107)
CO2 SERPL-SCNC: 28.1 MMOL/L (ref 22–29)
CREAT SERPL-MCNC: 1.79 MG/DL (ref 0.76–1.27)
CRP SERPL-MCNC: 0.43 MG/DL (ref 0–0.5)
DEPRECATED RDW RBC AUTO: 43.8 FL (ref 37–54)
EGFRCR SERPLBLD CKD-EPI 2021: 40.8 ML/MIN/1.73
EOSINOPHIL # BLD AUTO: 0.11 10*3/MM3 (ref 0–0.4)
EOSINOPHIL NFR BLD AUTO: 1.5 % (ref 0.3–6.2)
ERYTHROCYTE [DISTWIDTH] IN BLOOD BY AUTOMATED COUNT: 14 % (ref 12.3–15.4)
ERYTHROCYTE [SEDIMENTATION RATE] IN BLOOD: 13 MM/HR (ref 0–20)
EXPIRATION DATE: ABNORMAL
EXPIRATION DATE: ABNORMAL
GLOBULIN UR ELPH-MCNC: 3 GM/DL
GLUCOSE BLDC GLUCOMTR-MCNC: 193 MG/DL (ref 70–130)
GLUCOSE SERPL-MCNC: 119 MG/DL (ref 65–99)
HBA1C MFR BLD: 7.1 % (ref 4.5–5.7)
HCT VFR BLD AUTO: 36.7 % (ref 37.5–51)
HGB BLD-MCNC: 12.8 G/DL (ref 13–17.7)
IMM GRANULOCYTES # BLD AUTO: 0.01 10*3/MM3 (ref 0–0.05)
IMM GRANULOCYTES NFR BLD AUTO: 0.1 % (ref 0–0.5)
LYMPHOCYTES # BLD AUTO: 1.19 10*3/MM3 (ref 0.7–3.1)
LYMPHOCYTES NFR BLD AUTO: 16.2 % (ref 19.6–45.3)
Lab: ABNORMAL
Lab: ABNORMAL
MCH RBC QN AUTO: 30.7 PG (ref 26.6–33)
MCHC RBC AUTO-ENTMCNC: 34.9 G/DL (ref 31.5–35.7)
MCV RBC AUTO: 88 FL (ref 79–97)
MONOCYTES # BLD AUTO: 0.76 10*3/MM3 (ref 0.1–0.9)
MONOCYTES NFR BLD AUTO: 10.3 % (ref 5–12)
NEUTROPHILS NFR BLD AUTO: 5.23 10*3/MM3 (ref 1.7–7)
NEUTROPHILS NFR BLD AUTO: 71.2 % (ref 42.7–76)
NRBC BLD AUTO-RTO: 0 /100 WBC (ref 0–0.2)
PLATELET # BLD AUTO: 293 10*3/MM3 (ref 140–450)
PMV BLD AUTO: 9.1 FL (ref 6–12)
POTASSIUM SERPL-SCNC: 4.5 MMOL/L (ref 3.5–5.2)
PROT SERPL-MCNC: 7 G/DL (ref 6–8.5)
RBC # BLD AUTO: 4.17 10*6/MM3 (ref 4.14–5.8)
SODIUM SERPL-SCNC: 139 MMOL/L (ref 136–145)
WBC NRBC COR # BLD AUTO: 7.35 10*3/MM3 (ref 3.4–10.8)

## 2024-12-04 PROCEDURE — 36415 COLL VENOUS BLD VENIPUNCTURE: CPT

## 2024-12-04 PROCEDURE — 85025 COMPLETE CBC W/AUTO DIFF WBC: CPT

## 2024-12-04 PROCEDURE — 80053 COMPREHEN METABOLIC PANEL: CPT

## 2024-12-04 PROCEDURE — 86140 C-REACTIVE PROTEIN: CPT

## 2024-12-04 PROCEDURE — 85652 RBC SED RATE AUTOMATED: CPT

## 2024-12-04 RX ORDER — DULAGLUTIDE 0.75 MG/.5ML
0.75 INJECTION, SOLUTION SUBCUTANEOUS WEEKLY
Qty: 2 ML | Refills: 5 | Status: SHIPPED | OUTPATIENT
Start: 2024-12-04

## 2024-12-04 RX ORDER — HYDROCHLOROTHIAZIDE 12.5 MG/1
CAPSULE ORAL
Qty: 2 EACH | Refills: 5 | Status: SHIPPED | OUTPATIENT
Start: 2024-12-04

## 2024-12-04 RX ORDER — ATORVASTATIN CALCIUM 20 MG/1
20 TABLET, FILM COATED ORAL DAILY
Qty: 90 TABLET | Refills: 1 | Status: SHIPPED | OUTPATIENT
Start: 2024-12-04

## 2024-12-04 NOTE — PROGRESS NOTES
"Chief Complaint   Patient presents with    Diabetes        HPI   Pascual Kaur is a 69 y.o. male had concerns including Diabetes.      Patient presents for follow-up of diabetes.  He was last seen in July 2024. Data from glucometer was reviewed.  Patient does report that he has had some intermittent illnesses in the interim since last visit and did miss a few doses of Trulicity.    Current diabetes medications include the following:  Levemir 30 units twice daily  Trulicity 0.75 mg weekly  Patient reports rare use of NovoLog.    Patient is taking atorvastatin 20 mg daily for hyperlipidemia.    The following portions of the patient's history were reviewed and updated as appropriate: allergies, current medications, and past social history.    Review of Systems   Gastrointestinal:  Negative for nausea and vomiting.      /62   Pulse 101   Ht 165.1 cm (65\")   Wt 66.6 kg (146 lb 12.8 oz)   BMI 24.43 kg/m²      Physical Exam      Constitutional:  well developed; well nourished  no acute distress  appears stated age   ENT/Thyroid: not examined   Eyes: Conjunctiva: clear   Respiratory:  clear to auscultation bilaterally   Cardiovascular:  regular rate and rhythm   Chest:  Not performed.   Abdomen: Not performed.   : Not performed.   Musculoskeletal: Not performed   Skin: not performed.   Neuro: mental status, speech normal   Psych: mood and affect are within normal limits       Labs/Imaging  A1C:  Lab Results   Component Value Date    HGBA1C 7.1 (A) 12/04/2024    HGBA1C 7.2 (A) 07/23/2024      Glucose:  Lab Results   Component Value Date    POCGLU 193 (A) 12/04/2024      CMP:  Lab Results   Component Value Date    GLUCOSE 214 (H) 07/23/2024    BUN 17 07/23/2024    CREATININE 1.16 07/23/2024     07/23/2024    K 4.4 07/23/2024     07/23/2024    CALCIUM 9.3 07/23/2024    PROTEINTOT 6.4 07/23/2024    ALBUMIN 3.7 07/23/2024    ALT 14 07/23/2024    AST 17 07/23/2024    ALKPHOS 257 (H) 07/23/2024 "    BILITOT 0.2 07/23/2024    GLOB 2.7 07/23/2024    AGRATIO 1.4 07/23/2024    BCR 14.7 07/23/2024    ANIONGAP 9.0 07/23/2024    EGFR 68.6 07/23/2024      Lipid Panel:  Lab Results   Component Value Date    CHOL 103 07/23/2024    CHLPL 107 06/29/2020    TRIG 109 07/23/2024    HDL 36 (L) 07/23/2024    LDL 47 07/23/2024      Urine Microalbumin:  Lab Results   Component Value Date    MICROALBUR 91.7 07/23/2024      TSH:  Lab Results   Component Value Date    TSH 1.380 07/28/2023         Diagnoses and all orders for this visit:    1. Type 2 diabetes mellitus with hyperglycemia, with long-term current use of insulin (Primary)  -     POC Glucose, Blood  -     POC Glycosylated Hemoglobin (Hb A1C)  Hemoglobin A1C is near goal, 7.1%  Patient does report missed doses of trulicity, given A1C near target, will defer changes.  Patient to continue trulicity 0.75 mcg weekly  Patient to continue levemir 30 units BID, discussed need to transition to alternative in the future. Patient's wife will contact insurance regarding preferred alternative  Patient to continue novolog per correctional scale  Reviewed goals for glucose control and recommendations for monitoring. Patient had issues with connectivity of anthony 3 device, will change to anthony 3 plus  Screening labs are up to date.    2. Hyperlipidemia, unspecified hyperlipidemia type  Lipid panel is up to date from 7/24 with LDL at goal, patient will continue atorvastatin    Other orders  -     Continuous Glucose Sensor (FreeStyle Anthony 3 Plus Sensor); Change every 15 days  Dispense: 2 each; Refill: 5  -     atorvastatin (LIPITOR) 20 MG tablet; Take 1 tablet by mouth Daily.  Dispense: 90 tablet; Refill: 1  -     Dulaglutide (Trulicity) 0.75 MG/0.5ML solution auto-injector; Inject 0.75 mg under the skin into the appropriate area as directed 1 (One) Time Per Week.  Dispense: 2 mL; Refill: 5       Return in about 4 months (around 4/4/2025). The patient was instructed to contact the  clinic with any interval questions or concerns.    Electronically signed by: Maryan Jarquin MD   Endocrinologist    Dictated Utilizing Dragon Dictation

## 2025-01-09 ENCOUNTER — OFFICE VISIT (OUTPATIENT)
Dept: INTERNAL MEDICINE | Facility: CLINIC | Age: 70
End: 2025-01-09
Payer: COMMERCIAL

## 2025-01-09 VITALS
WEIGHT: 142 LBS | HEART RATE: 89 BPM | DIASTOLIC BLOOD PRESSURE: 83 MMHG | SYSTOLIC BLOOD PRESSURE: 153 MMHG | TEMPERATURE: 98.2 F | OXYGEN SATURATION: 99 % | RESPIRATION RATE: 16 BRPM | BODY MASS INDEX: 23.66 KG/M2 | HEIGHT: 65 IN

## 2025-01-09 DIAGNOSIS — R35.1 NOCTURIA: ICD-10-CM

## 2025-01-09 DIAGNOSIS — E11.65 TYPE 2 DIABETES MELLITUS WITH HYPERGLYCEMIA, WITH LONG-TERM CURRENT USE OF INSULIN: Primary | ICD-10-CM

## 2025-01-09 DIAGNOSIS — Z12.11 ENCOUNTER FOR SCREENING FOR MALIGNANT NEOPLASM OF COLON: ICD-10-CM

## 2025-01-09 DIAGNOSIS — R55 VASOVAGAL SYNCOPE: ICD-10-CM

## 2025-01-09 DIAGNOSIS — Z79.4 TYPE 2 DIABETES MELLITUS WITH HYPERGLYCEMIA, WITH LONG-TERM CURRENT USE OF INSULIN: Primary | ICD-10-CM

## 2025-01-09 RX ORDER — CARBOXYMETHYLCELLULOSE SODIUM 10 MG/ML
1 GEL OPHTHALMIC DAILY PRN
COMMUNITY

## 2025-01-09 NOTE — PROGRESS NOTES
Subjective  Pascual Kaur is a 69 y.o. male    HPI coming in for evaluation of an episode where he got up from his bed and within a few seconds felt lightheaded and weak in his legs and lost consciousness for a few seconds.  EMS was summoned.  By the time they arrived his vitals and blood sugar were reported to be okay.  He was advised to go to the ER however he chose not to stay since he was feeling reasonably well.  Since that episode has been doing well denies current lightheadedness chest pain or palpitations he has a history of rheumatoid arthritis history of lung CA status post resection history of COPD.  He quit tobacco use more than 4 years ago.  Reasonably good diabetes control with the help of endocrinologist    The following portions of the patient's history were reviewed and updated as appropriate: allergies, current medications, past family history, past medical history, past social history, past surgical history, and problem list.     Review of Systems   Constitutional:  Positive for diaphoresis and fatigue. Negative for activity change, appetite change and fever.   HENT:  Negative for congestion, ear discharge, ear pain and trouble swallowing.    Eyes:  Negative for photophobia and visual disturbance.   Respiratory:  Negative for cough and shortness of breath.    Cardiovascular:  Negative for chest pain and palpitations.   Gastrointestinal:  Negative for abdominal distention, abdominal pain, constipation, diarrhea, nausea and vomiting.   Endocrine: Negative.    Genitourinary:  Negative for dysuria, hematuria and urgency.   Musculoskeletal:  Positive for neck pain. Negative for arthralgias, back pain, joint swelling and myalgias.   Skin:  Negative for color change and rash.   Allergic/Immunologic: Negative.    Neurological:  Positive for weakness. Negative for dizziness, light-headedness and headaches.   Hematological:  Negative for adenopathy. Does not bruise/bleed easily.  "  Psychiatric/Behavioral:  Negative for agitation, confusion and dysphoric mood. The patient is not nervous/anxious.        Visit Vitals  /83   Pulse 89   Temp 98.2 °F (36.8 °C) (Infrared)   Resp 16   Ht 165.1 cm (65\")   Wt 64.4 kg (142 lb)   SpO2 99%   BMI 23.63 kg/m²       Objective  Physical Exam  Constitutional:       General: He is not in acute distress.     Appearance: He is well-developed.   HENT:      Nose: Nose normal.   Eyes:      General: No scleral icterus.     Conjunctiva/sclera: Conjunctivae normal.   Neck:      Thyroid: No thyromegaly.      Trachea: No tracheal deviation.   Cardiovascular:      Rate and Rhythm: Normal rate and regular rhythm.      Heart sounds: No murmur heard.     No friction rub.   Pulmonary:      Effort: No respiratory distress.      Breath sounds: No wheezing or rales.   Abdominal:      General: There is no distension.      Palpations: Abdomen is soft. There is no mass.      Tenderness: There is no abdominal tenderness. There is no guarding.   Musculoskeletal:         General: Deformity present. Normal range of motion.   Lymphadenopathy:      Cervical: No cervical adenopathy.   Skin:     General: Skin is warm and dry.      Findings: No erythema or rash.   Neurological:      Mental Status: He is alert and oriented to person, place, and time.      Cranial Nerves: No cranial nerve deficit.      Coordination: Coordination normal.      Deep Tendon Reflexes: Reflexes are normal and symmetric.   Psychiatric:         Behavior: Behavior normal.         Thought Content: Thought content normal.         Judgment: Judgment normal.       Diagnoses and all orders for this visit:    Type 2 diabetes mellitus with hyperglycemia, with long-term current use of insulin stable with current regimen without episodes suggestive of hypoglycemia    Encounter for screening for malignant neoplasm of colon  -     Cologuard - Stool, Per Rectum; Future    Nocturia  -     Comprehensive Metabolic Panel  -    "  PSA DIAGNOSTIC    Vasovagal syncope encourage adequate hydration, check routine lab work    Other orders  -     carboxymethylcellulose sod 1 % gel eye gel; Administer 1 drop to both eyes Daily As Needed (Dr eyes).  -     Cancel: Fluzone High-Dose 65+yrs (7352-2191)  -     Pneumococcal Conjugate Vaccine 20-Valent (PCV20)  -     Tdap Vaccine => 8yo IM (BOOSTRIX/ADACEL)  -     Fluzone >6mos (6436-4361)        BMI is within normal parameters. No other follow-up for BMI required.      Answers submitted by the patient for this visit:  Primary Reason for Visit (Submitted on 1/3/2025)  What is the primary reason for your visit?: Problem Not Listed

## 2025-01-09 NOTE — LETTER
January 9, 2025     Patient: Pascual Kaur   YOB: 1955   Date of Visit: 1/9/2025       To Whom It May Concern:  Pascual was evaluated by me today in the clinic.  No significant abnormalities were noted with his exam.  He is fit to resume regular work effective immediately.  It is my medical opinion that Pascual Kaur may return to full duty immediately with no restrictions.           Sincerely,        Humberto Kumari MD    CC: No Recipients

## 2025-01-10 LAB
ALBUMIN SERPL-MCNC: 4 G/DL (ref 3.9–4.9)
ALP SERPL-CCNC: 196 IU/L (ref 44–121)
ALT SERPL-CCNC: 14 IU/L (ref 0–44)
AST SERPL-CCNC: 20 IU/L (ref 0–40)
BILIRUB SERPL-MCNC: 0.2 MG/DL (ref 0–1.2)
BUN SERPL-MCNC: 27 MG/DL (ref 8–27)
BUN/CREAT SERPL: 16 (ref 10–24)
CALCIUM SERPL-MCNC: 9.1 MG/DL (ref 8.6–10.2)
CHLORIDE SERPL-SCNC: 101 MMOL/L (ref 96–106)
CO2 SERPL-SCNC: 25 MMOL/L (ref 20–29)
CREAT SERPL-MCNC: 1.65 MG/DL (ref 0.76–1.27)
EGFRCR SERPLBLD CKD-EPI 2021: 45 ML/MIN/1.73
GLOBULIN SER CALC-MCNC: 2.7 G/DL (ref 1.5–4.5)
GLUCOSE SERPL-MCNC: 97 MG/DL (ref 70–99)
POTASSIUM SERPL-SCNC: 5.2 MMOL/L (ref 3.5–5.2)
PROT SERPL-MCNC: 6.7 G/DL (ref 6–8.5)
PSA SERPL-MCNC: 0.5 NG/ML (ref 0–4)
SODIUM SERPL-SCNC: 138 MMOL/L (ref 134–144)

## 2025-01-13 RX ORDER — BLOOD SUGAR DIAGNOSTIC
STRIP MISCELLANEOUS
Qty: 300 EACH | Refills: 0 | Status: SHIPPED | OUTPATIENT
Start: 2025-01-13

## 2025-01-13 NOTE — TELEPHONE ENCOUNTER
Rx Refill Note  Requested Prescriptions     Pending Prescriptions Disp Refills    glucose blood (Prodigy No Coding Blood Gluc) test strip 300 each 3     Sig: Test glucose three times daily for diabetes      Last office visit with prescribing clinician: 12/4/2024     Next office visit with prescribing clinician: 4/2/2025     Deena Joseph MA  01/13/25, 09:25 EST

## 2025-03-13 DIAGNOSIS — F41.0 PANIC ATTACKS: ICD-10-CM

## 2025-03-13 DIAGNOSIS — F41.9 ANXIETY: ICD-10-CM

## 2025-03-13 NOTE — TELEPHONE ENCOUNTER
Will need to make and keep appointment to discuss refills. Has been out of medication for a few months.

## 2025-03-13 NOTE — TELEPHONE ENCOUNTER
Spoke to Yandy, she said he  is not out of meds and has follow up appointment scheduled for 03/31/2025.    Rx Refill Note  Requested Prescriptions     Pending Prescriptions Disp Refills    FLUoxetine (PROzac) 40 MG capsule 30 capsule 2     Sig: Take 1 capsule by mouth Daily.    mirtazapine (REMERON) 7.5 MG tablet 30 tablet 1     Sig: Take 1 tablet by mouth Every Night.      Last office visit with prescribing clinician: 11/1/2023   Last telemedicine visit with prescribing clinician: 10/2/2024   Next office visit with prescribing clinician: 3/31/2025                         Would you like a call back once the refill request has been completed: [] Yes [] No    If the office needs to give you a call back, can they leave a voicemail: [] Yes [] No    Maria Del Carmen Hawley CMA  03/13/25, 10:39 EDT

## 2025-03-16 RX ORDER — FLUOXETINE HYDROCHLORIDE 40 MG/1
40 CAPSULE ORAL DAILY
Qty: 30 CAPSULE | Refills: 1 | Status: SHIPPED | OUTPATIENT
Start: 2025-03-16

## 2025-03-16 RX ORDER — MIRTAZAPINE 7.5 MG/1
7.5 TABLET, FILM COATED ORAL NIGHTLY
Qty: 30 TABLET | Refills: 1 | Status: SHIPPED | OUTPATIENT
Start: 2025-03-16

## 2025-03-31 ENCOUNTER — OFFICE VISIT (OUTPATIENT)
Dept: PSYCHIATRY | Facility: CLINIC | Age: 70
End: 2025-03-31
Payer: COMMERCIAL

## 2025-03-31 VITALS
WEIGHT: 154.5 LBS | HEIGHT: 65 IN | HEART RATE: 78 BPM | DIASTOLIC BLOOD PRESSURE: 84 MMHG | BODY MASS INDEX: 25.74 KG/M2 | SYSTOLIC BLOOD PRESSURE: 132 MMHG | OXYGEN SATURATION: 98 %

## 2025-03-31 DIAGNOSIS — F41.0 PANIC ATTACKS: ICD-10-CM

## 2025-03-31 DIAGNOSIS — F41.9 ANXIETY: Primary | ICD-10-CM

## 2025-03-31 PROCEDURE — 99214 OFFICE O/P EST MOD 30 MIN: CPT | Performed by: NURSE PRACTITIONER

## 2025-03-31 PROCEDURE — 96127 BRIEF EMOTIONAL/BEHAV ASSMT: CPT | Performed by: NURSE PRACTITIONER

## 2025-03-31 RX ORDER — UPADACITINIB 15 MG/1
15 TABLET, EXTENDED RELEASE ORAL EVERY MORNING
COMMUNITY
Start: 2025-03-26

## 2025-03-31 NOTE — PROGRESS NOTES
Subjective   Pascual Kaur is a 69 y.o. male who presents today for follow up    Chief Complaint:  anxiety    History of Present Illness:   History of Present Illness  Pascual Kaur presents today for medication management follow up. He is accompanied by his wife Yandy for visit per his request. His last follow up visit was 10/2/24. Current medication regimen includes Prozac 40 mg daily, mirtazapine 7.5 mg nightly and hydroxyzine 25 to 50 mg 3 times daily as needed.  Reports that he is doing well with current medication regimen. Continues to struggle with some anxiety. Still wakes at night with increased anxiety/panic, but severity and frequency has decreased with medication regimen. Anxiety and panic episodes occur more often at night when lying in bed. Able to be calmed and comforted by wife. Yandy says that he has utilized mirtazapine only as needed, taking only when off work the following day. Medication has been effective for initiating and maintaining quality sleep, but oversleeps the following day for work. Wakes frequently during night and feels that he has always required less sleep. Still working as a  Thursday, Friday, Saturday and every other Sunday. Voices frustration with work, now has to list at least five tasks completed each shift and submit for review. Denies issues with appetite. Denies SI/HI. Denies SI/HI.PHQ-9 total score: 2, ANDREW-7 total score: 3.    Previous Psych Meds: Lexapro (ineffective)     The following portions of the patient's history were reviewed and updated as appropriate: allergies, current medications, past family history, past medical history, past social history, past surgical history and problem list.    Past Medical History:   Diagnosis Date    Anxiety     Arthritis     Rheumatoid    Bronchitis     Cataract     Cataract surgery 2 years ago in both eyes    COPD (chronic obstructive pulmonary disease)     Diabetes mellitus type I     Elevated  "liver enzymes     Chronic elevated liver enzymes with history of elevated alkaline phosphate as well as ALT and AST.    Fatigue     Generalized fatigue, nondescript, not new, not better with exercise, not worse with exercise, not better with rest    GERD (gastroesophageal reflux disease)     History of chemotherapy     Received 1 course of adjuvant carboplatin and Taxotere. Then we started carbo.Taxol off a 3 on, 1 off weekly regimen but he did not tolerate either of those and subsequentlly refused further adjuvant therapy. On follow-up scanning since that time.    History of CT scan of chest 06/19/2014    CT of chest noncontrast showed no evidence of recurrence. There was stable diffuse scarring in the right middle and upper lung consistent with postoperative changes and stable obstructive emphysematous changes.    History of MRI     MRI of brain negative    Impaired functional mobility, balance, gait, and endurance     Kidney stone     Lung cancer 05/2011    Stage IIA, T2N1, non-small cell lung cancer, status post wedge resection of a 3 cm primary, level 10 node positive, preop PET negative, and MRI of brain negative lung cancer. Ineligible for our MORAB trial due to the wedge resection.  Diagnosis was in May 2011. - No recurrence.    Pneumonia     As a small child    Sinusitis     Stress     regarding his occupation    Type 2 diabetes mellitus with hyperglycemia, with long-term current use of insulin 02/04/2021    Visual impairment     Diabetic retinopathy     Social History     Socioeconomic History    Marital status:    Tobacco Use    Smoking status: Former     Current packs/day: 0.00     Average packs/day: 3.0 packs/day for 37.0 years (111.0 ttl pk-yrs)     Types: Cigarettes     Start date: 1/1/1973     Quit date: 1/1/2010     Years since quitting: 15.2    Smokeless tobacco: Never    Tobacco comments:     \"He denies smoking.\"   Vaping Use    Vaping status: Never Used   Substance and Sexual Activity    " Alcohol use: No    Drug use: No    Sexual activity: Defer     Comment: .     Family History   Problem Relation Age of Onset    Diabetes Mother     Hashimoto's thyroiditis Sister      Past Surgical History:   Procedure Laterality Date    CATARACT EXTRACTION  03/08/2021    CATARACT EXTRACTION  04/06/2021    CHOLECYSTECTOMY      EYE SURGERY Right     EYE SURGERY Left     KIDNEY STONE SURGERY      LUNG CANCER SURGERY      TONSILLECTOMY      VITRECTOMY Right 2023    Right eye     Patient Active Problem List   Diagnosis    Chronic obstructive pulmonary disease    Malignant neoplasm of upper lobe of right lung    Type 2 diabetes mellitus with hyperglycemia, with long-term current use of insulin    Rheumatoid arthritis involving multiple sites with positive rheumatoid factor    Reactive depression    Dizziness     Allergies   Allergen Reactions    Iodine GI Intolerance    Other Other (See Comments) and GI Intolerance     * IVP Dye    * IVP Dye * Nausea/Vomiting      Current Outpatient Medications   Medication Sig Dispense Refill    albuterol sulfate  (90 Base) MCG/ACT inhaler Inhale 2 puffs by mouth Every 4 (Four) Hours As Needed for Wheezing. 8.5 g 2    aspirin 81 MG chewable tablet Chew 1 tablet Daily.      atorvastatin (LIPITOR) 20 MG tablet Take 1 tablet by mouth Daily. 90 tablet 1    carboxymethylcellulose sod 1 % gel eye gel Administer 1 drop to both eyes Daily As Needed (Dr eyes).      Continuous Glucose Sensor (FreeStyle Anthony 3 Plus Sensor) Change every 15 days 2 each 5    Dulaglutide (Trulicity) 0.75 MG/0.5ML solution auto-injector Inject 0.75 mg under the skin into the appropriate area as directed 1 (One) Time Per Week. 2 mL 5    FLUoxetine (PROzac) 40 MG capsule Take 1 capsule by mouth Daily. 30 capsule 1    fluticasone (Flonase) 50 MCG/ACT nasal spray Instill 2 sprays into each nostril daily. 16 g 5    folic acid (FOLVITE) 1 MG tablet Take 1 tablet by mouth Daily. 30 tablet 3    folic acid  (FOLVITE) 1 MG tablet Take 1 tablet by mouth Daily. 30 tablet 3    glucose blood (Prodigy No Coding Blood Gluc) test strip Test glucose three times daily for diabetes 300 each 0    glucose blood test strip Use as directed 3-4 times a day (Patient taking differently: Use as directed 3-4 times a day) 100 each 5    Homeopathic Products (ALLERGY MEDICINE PO) Take 1 tablet by mouth Daily.      hydroxychloroquine (Plaquenil) 200 MG tablet Take 1 tablet by mouth Daily. 30 tablet 3    hydrOXYzine pamoate (VISTARIL) 25 MG capsule Take 1-2 capsules by mouth 3 (Three) Times a Day As Needed for Anxiety. 180 capsule 2    insulin aspart (NovoLOG FlexPen) 100 UNIT/ML solution pen-injector sc pen Inject as directed by correctional scale, MDD 20 units 15 mL 5    insulin detemir (Levemir FlexTouch) 100 UNIT/ML injection Inject 30 Units under the skin into the appropriate area as directed 2 (Two) Times a Day. 30 mL 5    methotrexate 2.5 MG tablet Take 10 tablets by mouth 1 (One) Time Per Week. 40 tablet 4    methotrexate 2.5 MG tablet Take 10 tablets by mouth 1 (One) Time Per Week. 40 tablet 4    mirtazapine (REMERON) 7.5 MG tablet Take 1 tablet by mouth Every Night. 30 tablet 1    omeprazole (priLOSEC) 40 MG capsule Take 1 capsule by mouth Daily. 90 capsule 3    predniSONE (DELTASONE) 10 MG tablet Take 1 Tablet by mouth Daily for 2 - 5 days as needed for a flare up, may repeat once a week 30 tablet 4    predniSONE (DELTASONE) 10 MG tablet Take 1 tablet by mouth Daily for 2-5 days as needed for flare ups. May repeat once a week as needed. 30 tablet 4    vitamin D (ERGOCALCIFEROL) 1.25 MG (13413 UT) capsule capsule Take 1 capsule by mouth 1 (One) Time Per Week. 4 capsule 3    Rinvoq 15 MG tablet sustained-release 24 hour  (Patient not taking: Reported on 3/31/2025)       No current facility-administered medications for this visit.     Review of Systems   Constitutional:  Negative for activity change, appetite change, unexpected weight  "gain and unexpected weight loss.   Respiratory:  Negative for shortness of breath.    Cardiovascular:  Negative for chest pain.   Psychiatric/Behavioral:  Positive for sleep disturbance and stress. Negative for suicidal ideas and depressed mood. The patient is nervous/anxious.      Physical Exam  Vitals reviewed.   Constitutional:       General: He is not in acute distress.     Appearance: Normal appearance.   Neurological:      Mental Status: He is alert.      Gait: Gait normal.     Vitals:   /84   Pulse 78   Ht 165.1 cm (65\")   Wt 70.1 kg (154 lb 8 oz)   SpO2 98%   BMI 25.71 kg/m²     Mental Status Exam:   Hygiene:   good  Cooperation:  Cooperative  Eye Contact:  Fair  Psychomotor Behavior:  Appropriate  Affect:  Full range and Appropriate  Mood: normal  Hopelessness: Denies  Speech:  Normal  Thought Process:  Goal directed and Linear  Thought Content:  Mood congruent  Suicidal:  None  Homicidal:  None  Hallucinations:  None  Delusion:  None  Memory:  Intact  Orientation:  Person, Place, Time, and Situation  Reliability:  good  Insight:  Good  Judgement:  Good  Impulse Control:  Good    Assessment & Plan   Problems Addressed this Visit    None  Visit Diagnoses         Anxiety    -  Primary      Panic attacks              Diagnoses         Codes Comments      Anxiety    -  Primary ICD-10-CM: F41.9  ICD-9-CM: 300.00       Panic attacks     ICD-10-CM: F41.0  ICD-9-CM: 300.01           Visit Diagnoses:    ICD-10-CM ICD-9-CM   1. Anxiety  F41.9 300.00   2. Panic attacks  F41.0 300.01     Pascual presents for medication management follow-up.  His wife, Yandy is also present for collateral information.  Voices that he is happy with current medication regimen. Overall anxiety/panic attacks have decreased in frequency and severity. No current bothersome symptoms of depression. Continues to struggle with initiating and maintaining sleep that is improved with mirtazapine, but tends to oversleep the following day for " work after taking medication.  Discussed medication regimen, agreeable trial increase of mirtazapine from 7.5 mg to 15 mg nightly for sleep as higher dose may decrease sedating effects the following morning. Will send refill for Mirtazapine 15 mg nightly if effective. Yandy is agreeable to call office to provide update. Will continue with Prozac 40 mg daily and hydroxyzine 25-50 mg 3 times daily as needed.     -Increase Mirtazapine to 7.5 mg to 15 mg nightly as trial over next two weeks. Will refill Mirtazapine 15 mg nightly if effective.   -Continue Prozac 40 mg daily for anxiety/panic episodes  -Continue Hydroxyzine Pamoate 25-50 mg 3 times daily as needed for anxiety/panic episodes.    -Reviewed previous available documentation and most recent available labs.   MAKENZIE reviewed and is appropriate.     GOALS:  Short Term Goals: Patient will be compliant with medication, and patient will have no significant medication related side effects.  Patient will be engaged in psychotherapy as indicated.  Patient will report subjective improvement of symptoms.  Long term goals: To stabilize mood and treat/improve subjective symptoms, the patient will stay out of the hospital, the patient will be at an optimal level of functioning, and the patient will take all medications as prescribed.  The patient/guardian verbalized understanding and agreement with goals that were mutually set.    TREATMENT PLAN: Continue supportive psychotherapy efforts and medications as indicated for patient's diagnosis.  Pharmacological and Non-Pharmacological treatment options discussed during today's visit. Patient/Guardian acknowledged and verbally consented with current treatment plan and was educated on the importance of compliance with treatment and follow-up appointments.      MEDICATION ISSUES:  Discussed medication options and treatment plan of prescribed medication as well as the risks, benefits, any black box warnings, and side effects  including potential falls, possible impaired driving, and metabolic adversities among others. Patient is agreeable to call the office with any worsening of symptoms or onset of side effects, or if any concerns or questions arise.  The contact information for the office is made available to the patient. Patient is agreeable to call 911 or go to the nearest ER should they begin having any SI/HI, or if any urgent concerns arise. No medication side effects or related complaints today.     MEDS ORDERED DURING VISIT:  No orders of the defined types were placed in this encounter.      FOLLOW UP:  Return in about 3 months (around 6/30/2025) for Recheck.             This document has been electronically signed by TIMOTHY Garnett  March 31, 2025 15:39 EDT    Please note that portions of this note were completed with a voice recognition program. Efforts were made to edit dictation, but occasionally words are mistranscribed.

## 2025-04-02 ENCOUNTER — OFFICE VISIT (OUTPATIENT)
Dept: ENDOCRINOLOGY | Facility: CLINIC | Age: 70
End: 2025-04-02
Payer: COMMERCIAL

## 2025-04-02 VITALS
HEART RATE: 78 BPM | WEIGHT: 147.2 LBS | HEIGHT: 65 IN | SYSTOLIC BLOOD PRESSURE: 122 MMHG | BODY MASS INDEX: 24.53 KG/M2 | DIASTOLIC BLOOD PRESSURE: 70 MMHG

## 2025-04-02 DIAGNOSIS — E11.65 TYPE 2 DIABETES MELLITUS WITH HYPERGLYCEMIA, WITH LONG-TERM CURRENT USE OF INSULIN: Primary | ICD-10-CM

## 2025-04-02 DIAGNOSIS — Z79.4 TYPE 2 DIABETES MELLITUS WITH HYPERGLYCEMIA, WITH LONG-TERM CURRENT USE OF INSULIN: Primary | ICD-10-CM

## 2025-04-02 DIAGNOSIS — E78.5 HYPERLIPIDEMIA, UNSPECIFIED HYPERLIPIDEMIA TYPE: ICD-10-CM

## 2025-04-02 LAB
EXPIRATION DATE: ABNORMAL
EXPIRATION DATE: ABNORMAL
GLUCOSE BLDC GLUCOMTR-MCNC: 317 MG/DL (ref 70–130)
HBA1C MFR BLD: 7.2 % (ref 4.5–5.7)
Lab: ABNORMAL
Lab: ABNORMAL

## 2025-04-02 RX ORDER — INSULIN DETEMIR 100 [IU]/ML
30 INJECTION, SOLUTION SUBCUTANEOUS 2 TIMES DAILY
Qty: 30 ML | Refills: 5 | Status: SHIPPED | OUTPATIENT
Start: 2025-04-02

## 2025-04-02 RX ORDER — INSULIN ASPART 100 [IU]/ML
INJECTION, SOLUTION INTRAVENOUS; SUBCUTANEOUS
Qty: 15 ML | Refills: 5 | Status: SHIPPED | OUTPATIENT
Start: 2025-04-02

## 2025-04-02 RX ORDER — DULAGLUTIDE 0.75 MG/.5ML
0.75 INJECTION, SOLUTION SUBCUTANEOUS WEEKLY
Qty: 2 ML | Refills: 5 | Status: SHIPPED | OUTPATIENT
Start: 2025-04-02

## 2025-04-02 NOTE — PROGRESS NOTES
"Chief Complaint   Patient presents with    Diabetes        HPI   Pascual Kaur is a 69 y.o. male had concerns including Diabetes.      Patient presents for follow-up of diabetes.  He was last seen in December 2024.  Patient reports he has been having to use more prednisone recently due to rheumatoid arthritis.  They are in the process of changing him to a new medication in hopes that this will improve symptoms and reduce steroid use.  Patient is monitoring blood glucose using freestyle judah.    Current diabetes medications include the following:  Levemir 30 units twice daily  NovoLog per correctional scale, generally not exceeding 10 units daily  Trulicity 0.75 mg weekly    Patient is taking Lipitor 20 mg daily for hyperlipidemia.    The following portions of the patient's history were reviewed and updated as appropriate: allergies, current medications, and past social history.    Review of Systems   Gastrointestinal:  Negative for nausea and vomiting.   Musculoskeletal:  Positive for arthralgias.        /70   Pulse 78   Ht 165.1 cm (65\")   Wt 66.8 kg (147 lb 3.2 oz)   BMI 24.50 kg/m²      Physical Exam      Constitutional:  well developed; well nourished  no acute distress   ENT/Thyroid: not examined   Eyes: Conjunctiva: clear   Respiratory:  breathing is unlabored  clear to auscultation bilaterally   Cardiovascular:  regular rate and rhythm   Chest:  Not performed.   Abdomen: Not performed.   : Not performed.   Musculoskeletal: Not performed   Skin: dry and warm   Neuro: mental status, speech normal   Psych: mood and affect are within normal limits       Labs/Imaging  A1C:  Lab Results   Component Value Date    HGBA1C 7.2 (A) 04/02/2025    HGBA1C 7.1 (A) 12/04/2024      Glucose:  Lab Results   Component Value Date    POCGLU 317 (A) 04/02/2025      CMP:  Lab Results   Component Value Date    GLUCOSE 97 01/09/2025    BUN 27 01/09/2025    CREATININE 1.65 (H) 01/09/2025     01/09/2025    " K 5.2 01/09/2025     01/09/2025    CALCIUM 9.1 01/09/2025    PROTEINTOT 6.7 01/09/2025    ALBUMIN 4.0 01/09/2025    ALT 14 01/09/2025    AST 20 01/09/2025    ALKPHOS 196 (H) 01/09/2025    BILITOT 0.2 01/09/2025    GLOB 2.7 01/09/2025    AGRATIO 1.3 12/04/2024    BCR 16 01/09/2025    ANIONGAP 9.9 12/04/2024    EGFR 45 (L) 01/09/2025      Lipid Panel:  Lab Results   Component Value Date    CHOL 103 07/23/2024    CHLPL 107 06/29/2020    TRIG 109 07/23/2024    HDL 36 (L) 07/23/2024    LDL 47 07/23/2024      Urine Microalbumin:  Lab Results   Component Value Date    MICROALBUR 91.7 07/23/2024      TSH:  Lab Results   Component Value Date    TSH 1.380 07/28/2023       Freestyle judah downloaded for review for dates ranging March 20 through April 2, 2025, CGM is active 96% of the time with average glucose 222, GMI 8.6% with 39.3% glucose variability.  37% of data is within target range, 31% high, 32% very high.  Patient with intermittent profound episodes of hyperglycemia correlating to use of prednisone per her report.    Diagnoses and all orders for this visit:    1. Type 2 diabetes mellitus with hyperglycemia, with long-term current use of insulin (Primary)  -     POC Glucose, Blood  -     POC Glycosylated Hemoglobin (Hb A1C)  -     insulin aspart (NovoLOG FlexPen) 100 UNIT/ML solution pen-injector sc pen; Inject as directed by correctional scale, MDD 20 units  Dispense: 15 mL; Refill: 5  -     insulin detemir (Levemir FlexTouch) 100 UNIT/ML injection; Inject 30 Units under the skin into the appropriate area as directed 2 (Two) Times a Day.  Dispense: 30 mL; Refill: 5  Hemoglobin A1c is near goal, 7.2%  Patient is hyperglycemic during office visit.  He ate a cinnamon roll prior to appointment.  Reviewed potential adverse effects of uncontrolled diabetes.  CGM containing 72 hours of glucose data was downloaded for review.  CGM shows more hyperglycemia recently.  This correlates to steroid use.  Reviewed options for  medication changes.  Given change in rheumatoid arthritis therapy which will hopefully reduce need for prednisone, patient did not want to make changes at this time.  Patient will continue Levemir 30 units twice daily  Patient will continue NovoLog per correctional scale  Patient will continue Trulicity 0.75 mg weekly  CMP reviewed from January 2025  Lipid panel and urine microalbumin are up-to-date from July 2024    2. Hyperlipidemia, unspecified hyperlipidemia type  Lipid panel is up-to-date from July 2024.  Patient will continue Lipitor.    Other orders  -     Dulaglutide (Trulicity) 0.75 MG/0.5ML solution auto-injector; Inject 0.75 mg under the skin into the appropriate area as directed 1 (One) Time Per Week.  Dispense: 2 mL; Refill: 5         Return in about 4 months (around 8/2/2025). The patient was instructed to contact the clinic with any interval questions or concerns.    Electronically signed by: Maryan Jarquin MD   Endocrinologist    Dictated Utilizing Dragon Dictation

## 2025-04-03 ENCOUNTER — OFFICE VISIT (OUTPATIENT)
Dept: INTERNAL MEDICINE | Facility: CLINIC | Age: 70
End: 2025-04-03
Payer: COMMERCIAL

## 2025-04-03 VITALS
HEIGHT: 65 IN | TEMPERATURE: 97.9 F | BODY MASS INDEX: 24.83 KG/M2 | WEIGHT: 149 LBS | DIASTOLIC BLOOD PRESSURE: 100 MMHG | HEART RATE: 86 BPM | OXYGEN SATURATION: 98 % | SYSTOLIC BLOOD PRESSURE: 160 MMHG | RESPIRATION RATE: 18 BRPM

## 2025-04-03 DIAGNOSIS — E11.65 TYPE 2 DIABETES MELLITUS WITH HYPERGLYCEMIA, WITH LONG-TERM CURRENT USE OF INSULIN: Primary | ICD-10-CM

## 2025-04-03 DIAGNOSIS — C34.11 MALIGNANT NEOPLASM OF UPPER LOBE OF RIGHT LUNG: ICD-10-CM

## 2025-04-03 DIAGNOSIS — M05.79 RHEUMATOID ARTHRITIS INVOLVING MULTIPLE SITES WITH POSITIVE RHEUMATOID FACTOR: ICD-10-CM

## 2025-04-03 DIAGNOSIS — Z79.4 TYPE 2 DIABETES MELLITUS WITH HYPERGLYCEMIA, WITH LONG-TERM CURRENT USE OF INSULIN: Primary | ICD-10-CM

## 2025-04-03 RX ORDER — VALSARTAN 40 MG/1
40 TABLET ORAL DAILY
Qty: 90 TABLET | Refills: 3 | Status: SHIPPED | OUTPATIENT
Start: 2025-04-03

## 2025-04-03 NOTE — PROGRESS NOTES
Chief Complaint   Patient presents with    Annual Exam       Pascual Kaur is a 69 y.o. male and is here for a comprehensive physical exam. The patient reports problems - diabetes, lung ca .     History:  Erectile dysfunction  yes  Nocturia  yes      Do you take any herbs or supplements that were not prescribed by a doctor? no    Are you taking aspirin daily? yes      Health Habits:  Dental Exam.  edentulous  Eye Exam. up to date  Exercise: 0 times/week.  Current exercise activities include: none    Health Maintenance   Topic Date Due    ANNUAL PHYSICAL  Never done    DIABETIC EYE EXAM  10/10/2024    COLORECTAL CANCER SCREENING  12/08/2024    COVID-19 Vaccine (4 - 2024-25 season) 01/09/2026 (Originally 9/1/2024)    ZOSTER VACCINE (2 of 2) 01/09/2026 (Originally 2/15/2021)    INFLUENZA VACCINE  07/01/2025    LIPID PANEL  07/23/2025    URINE MICROALBUMIN-CREATININE RATIO (uACR)  07/23/2025    HEMOGLOBIN A1C  10/02/2025    TDAP/TD VACCINES (2 - Td or Tdap) 01/09/2035    HEPATITIS C SCREENING  Completed    Pneumococcal Vaccine 50+  Completed    AAA SCREEN ONCE  Completed    LUNG CANCER SCREENING  Discontinued       PMH, PSH, SocHx, FamHx, Allergies, and Medications: Reviewed and updated in the Visit Navigator.     Allergies   Allergen Reactions    Iodine GI Intolerance    Other Other (See Comments) and GI Intolerance     * IVP Dye    * IVP Dye * Nausea/Vomiting     Past Medical History:   Diagnosis Date    Anxiety     Arthritis     Rheumatoid    Bronchitis     Cataract     Cataract surgery 2 years ago in both eyes    COPD (chronic obstructive pulmonary disease)     Diabetes mellitus type I     Elevated liver enzymes     Chronic elevated liver enzymes with history of elevated alkaline phosphate as well as ALT and AST.    Fatigue     Generalized fatigue, nondescript, not new, not better with exercise, not worse with exercise, not better with rest    GERD (gastroesophageal reflux disease)     History of  "chemotherapy     Received 1 course of adjuvant carboplatin and Taxotere. Then we started carbo.Taxol off a 3 on, 1 off weekly regimen but he did not tolerate either of those and subsequentlly refused further adjuvant therapy. On follow-up scanning since that time.    History of CT scan of chest 06/19/2014    CT of chest noncontrast showed no evidence of recurrence. There was stable diffuse scarring in the right middle and upper lung consistent with postoperative changes and stable obstructive emphysematous changes.    History of MRI     MRI of brain negative    Impaired functional mobility, balance, gait, and endurance     Kidney stone     Lung cancer 05/2011    Stage IIA, T2N1, non-small cell lung cancer, status post wedge resection of a 3 cm primary, level 10 node positive, preop PET negative, and MRI of brain negative lung cancer. Ineligible for our MORAB trial due to the wedge resection.  Diagnosis was in May 2011. - No recurrence.    Pneumonia     As a small child    Sinusitis     Stress     regarding his occupation    Type 2 diabetes mellitus with hyperglycemia, with long-term current use of insulin 02/04/2021    Visual impairment     Diabetic retinopathy     Past Surgical History:   Procedure Laterality Date    CATARACT EXTRACTION  03/08/2021    CATARACT EXTRACTION  04/06/2021    CHOLECYSTECTOMY      EYE SURGERY Right     EYE SURGERY Left     KIDNEY STONE SURGERY      LUNG CANCER SURGERY      TONSILLECTOMY      VITRECTOMY Right 2023    Right eye     Social History     Socioeconomic History    Marital status:    Tobacco Use    Smoking status: Former     Current packs/day: 0.00     Average packs/day: 3.0 packs/day for 37.0 years (111.0 ttl pk-yrs)     Types: Cigarettes     Start date: 1/1/1973     Quit date: 1/1/2010     Years since quitting: 15.2    Smokeless tobacco: Never    Tobacco comments:     \"He denies smoking.\"   Vaping Use    Vaping status: Never Used   Substance and Sexual Activity    Alcohol " "use: No    Drug use: No    Sexual activity: Defer     Comment: .     Family History   Problem Relation Age of Onset    Diabetes Mother     Hashimoto's thyroiditis Sister        Review of Systems  Review of Systems   Constitutional:  Positive for fatigue. Negative for activity change, appetite change and fever.   HENT:  Negative for congestion, ear discharge, ear pain and trouble swallowing.    Eyes:  Negative for photophobia and visual disturbance.   Respiratory:  Negative for cough and shortness of breath.    Cardiovascular:  Negative for chest pain and palpitations.   Gastrointestinal:  Negative for abdominal distention, abdominal pain, constipation, diarrhea, nausea and vomiting.   Endocrine: Negative.    Genitourinary:  Negative for dysuria, hematuria and urgency.   Musculoskeletal:  Positive for arthralgias. Negative for back pain, joint swelling and myalgias.   Skin:  Negative for color change and rash.   Allergic/Immunologic: Negative.    Neurological:  Negative for dizziness, weakness, light-headedness and headaches.   Hematological:  Negative for adenopathy. Does not bruise/bleed easily.   Psychiatric/Behavioral:  Negative for agitation, confusion and dysphoric mood. The patient is not nervous/anxious.        Vitals:    04/03/25 1342   BP: 160/100   Pulse:    Resp:    Temp:    SpO2:        Objective   /100   Pulse 86   Temp 97.9 °F (36.6 °C)   Resp 18   Ht 165.1 cm (65\")   Wt 67.6 kg (149 lb)   SpO2 98%   BMI 24.79 kg/m²     Physical Exam  Constitutional:       General: He is not in acute distress.     Appearance: He is well-developed.   HENT:      Nose: Nose normal.   Eyes:      General: No scleral icterus.     Conjunctiva/sclera: Conjunctivae normal.   Neck:      Thyroid: No thyromegaly.      Trachea: No tracheal deviation.   Cardiovascular:      Rate and Rhythm: Normal rate and regular rhythm.      Heart sounds: No murmur heard.     No friction rub.   Pulmonary:      Effort: No " respiratory distress.      Breath sounds: No wheezing or rales.   Abdominal:      General: There is no distension.      Palpations: Abdomen is soft. There is no mass.      Tenderness: There is no abdominal tenderness. There is no guarding.   Musculoskeletal:         General: Deformity present. Normal range of motion.   Lymphadenopathy:      Cervical: No cervical adenopathy.   Skin:     General: Skin is warm and dry.      Findings: No erythema or rash.   Neurological:      Mental Status: He is alert and oriented to person, place, and time.      Cranial Nerves: No cranial nerve deficit.      Coordination: Coordination normal.      Deep Tendon Reflexes: Reflexes are normal and symmetric.   Psychiatric:         Behavior: Behavior normal.         Thought Content: Thought content normal.         Judgment: Judgment normal.       The 10-year CVD risk score (SMOOTH'Agostino, et al., 2008) is: 31.9%    Values used to calculate the score:      Age: 69 years      Sex: Male      Diabetic: Yes      Tobacco smoker: No      Systolic Blood Pressure: 160 mmHg      Is BP treated: No      HDL Cholesterol: 36 mg/dL      Total Cholesterol: 103 mg/dL    Lab Results   Component Value Date    CHOL 103 07/23/2024    CHLPL 107 06/29/2020    TRIG 109 07/23/2024    HDL 36 (L) 07/23/2024    LDL 47 07/23/2024     Glucose   Date Value Ref Range Status   01/09/2025 97 70 - 99 mg/dL Final   12/04/2024 119 (H) 65 - 99 mg/dL Final     BUN   Date Value Ref Range Status   01/09/2025 27 8 - 27 mg/dL Final   12/04/2024 26 (H) 8 - 23 mg/dL Final     Creatinine   Date Value Ref Range Status   01/09/2025 1.65 (H) 0.76 - 1.27 mg/dL Final   12/04/2024 1.79 (H) 0.76 - 1.27 mg/dL Final     Sodium   Date Value Ref Range Status   01/09/2025 138 134 - 144 mmol/L Final   12/04/2024 139 136 - 145 mmol/L Final     Potassium   Date Value Ref Range Status   01/09/2025 5.2 3.5 - 5.2 mmol/L Final   12/04/2024 4.5 3.5 - 5.2 mmol/L Final     Chloride   Date Value Ref Range  Status   01/09/2025 101 96 - 106 mmol/L Final   12/04/2024 101 98 - 107 mmol/L Final     CO2   Date Value Ref Range Status   12/04/2024 28.1 22.0 - 29.0 mmol/L Final     Total CO2   Date Value Ref Range Status   01/09/2025 25 20 - 29 mmol/L Final     Calcium   Date Value Ref Range Status   01/09/2025 9.1 8.6 - 10.2 mg/dL Final   12/04/2024 9.4 8.6 - 10.5 mg/dL Final     Total Protein   Date Value Ref Range Status   01/09/2025 6.7 6.0 - 8.5 g/dL Final   12/04/2024 7.0 6.0 - 8.5 g/dL Final     Albumin   Date Value Ref Range Status   01/09/2025 4.0 3.9 - 4.9 g/dL Final   12/04/2024 4.0 3.5 - 5.2 g/dL Final     ALT (SGPT)   Date Value Ref Range Status   01/09/2025 14 0 - 44 IU/L Final   12/04/2024 33 1 - 41 U/L Final     AST (SGOT)   Date Value Ref Range Status   01/09/2025 20 0 - 40 IU/L Final   12/04/2024 26 1 - 40 U/L Final     Alkaline Phosphatase   Date Value Ref Range Status   01/09/2025 196 (H) 44 - 121 IU/L Final   12/04/2024 190 (H) 39 - 117 U/L Final     Total Bilirubin   Date Value Ref Range Status   01/09/2025 0.2 0.0 - 1.2 mg/dL Final   12/04/2024 0.4 0.0 - 1.2 mg/dL Final     eGFR Non  Amer   Date Value Ref Range Status   11/30/2021 84 >60 mL/min/1.73 Final     BUN/Creatinine Ratio   Date Value Ref Range Status   01/09/2025 16 10 - 24 Final   12/04/2024 14.5 7.0 - 25.0 Final     Anion Gap   Date Value Ref Range Status   12/04/2024 9.9 5.0 - 15.0 mmol/L Final     Lab Results   Component Value Date    WBC 7.35 12/04/2024    HGB 12.8 (L) 12/04/2024    HCT 36.7 (L) 12/04/2024    MCV 88.0 12/04/2024     12/04/2024       Assessment & Plan   1. Healthy male exam.   2. Patient Counseling: Including but not Limited to the following, when appropriate:  --Nutrition: Stressed importance of moderation in sodium/caffeine intake, saturated fat and cholesterol, caloric balance, sufficient intake of fresh fruits, vegetables, fiber  .--Discussed the issue of daily use of baby aspirin.  --Exercise: Stressed the  importance of regular exercise.   --Substance Abuse: Discussed cessation/primary prevention of tobacco, alcohol, or other drug use; driving or other dangerous activities under the influence; availability of treatment for abuse, as indicated based on social history.    --Sexuality: Discussed sexually transmitted diseases, partner selection, use of condoms and contraceptive alternatives.   --Injury prevention: Discussed safety belts, safety helmets, smoke detector, smoking near bedding or upholstery.   --Dental health: Discussed importance of regular tooth brushing, flossing, and dental visits.  --Immunizations reviewed.  --Discussed benefits of colon cancer screening.      3. Discussed the patient's BMI with him. BMI is within normal parameters. No other follow-up for BMI required.  . The BMI is in the acceptable range  4. No follow-ups on file.  5. Age-appropriate Screening Scheduled  6. There are no Patient Instructions on file for this visit.    Assessment & Plan     Diagnoses and all orders for this visit:    1. Type 2 diabetes mellitus with hyperglycemia, with long-term current use of insulin (Primary) following up with endocrinology has evidence of diabetic nephropathy started valsartan today follow labs encourage adequate hydration.  Avoid NSAIDs    2. Rheumatoid arthritis involving multiple sites with positive rheumatoid factor stable following up with rheumatology on Plaquenil and methotrexate    3. Malignant neoplasm of upper lobe of right lung status post treatment.  Asymptomatic does not smoke.  No new symptoms    Other orders  -     valsartan (Diovan) 40 MG tablet; Take 1 tablet by mouth Daily.  Dispense: 90 tablet; Refill: 3

## 2025-04-04 LAB
BUN SERPL-MCNC: 17 MG/DL (ref 8–23)
BUN/CREAT SERPL: 14.5 (ref 7–25)
CALCIUM SERPL-MCNC: 9.3 MG/DL (ref 8.6–10.5)
CHLORIDE SERPL-SCNC: 103 MMOL/L (ref 98–107)
CO2 SERPL-SCNC: 30.4 MMOL/L (ref 22–29)
CREAT SERPL-MCNC: 1.17 MG/DL (ref 0.76–1.27)
EGFRCR SERPLBLD CKD-EPI 2021: 67.9 ML/MIN/1.73
GLUCOSE SERPL-MCNC: 126 MG/DL (ref 65–99)
POTASSIUM SERPL-SCNC: 4.9 MMOL/L (ref 3.5–5.2)
SODIUM SERPL-SCNC: 141 MMOL/L (ref 136–145)

## 2025-05-01 DIAGNOSIS — K21.9 GASTROESOPHAGEAL REFLUX DISEASE WITHOUT ESOPHAGITIS: ICD-10-CM

## 2025-05-01 RX ORDER — OMEPRAZOLE 40 MG/1
40 CAPSULE, DELAYED RELEASE ORAL DAILY
Qty: 90 CAPSULE | Refills: 3 | Status: SHIPPED | OUTPATIENT
Start: 2025-05-01

## 2025-07-14 ENCOUNTER — TELEMEDICINE (OUTPATIENT)
Dept: PSYCHIATRY | Facility: CLINIC | Age: 70
End: 2025-07-14
Payer: COMMERCIAL

## 2025-07-14 DIAGNOSIS — F41.0 PANIC ATTACKS: ICD-10-CM

## 2025-07-14 DIAGNOSIS — F41.9 ANXIETY: Primary | ICD-10-CM

## 2025-07-14 PROCEDURE — 99213 OFFICE O/P EST LOW 20 MIN: CPT | Performed by: NURSE PRACTITIONER

## 2025-07-14 PROCEDURE — 96127 BRIEF EMOTIONAL/BEHAV ASSMT: CPT | Performed by: NURSE PRACTITIONER

## 2025-07-14 RX ORDER — FLUOXETINE HYDROCHLORIDE 40 MG/1
40 CAPSULE ORAL DAILY
Qty: 90 CAPSULE | Refills: 1 | Status: SHIPPED | OUTPATIENT
Start: 2025-07-14

## 2025-07-14 RX ORDER — MIRTAZAPINE 15 MG/1
15 TABLET, FILM COATED ORAL NIGHTLY
Qty: 90 TABLET | Refills: 1 | Status: SHIPPED | OUTPATIENT
Start: 2025-07-14

## 2025-07-14 NOTE — PROGRESS NOTES
Mode of Visit: Video   Location of patient: -HOME-   Location of provider: +[facility]+   You have chosen to receive care through a telehealth visit.   The patient has signed the video visit consent form.   The visit included audio and video interaction. No technical issues occurred during this visit.     Subjective   Pascual Kaur is a 70 y.o. male who presents today for follow up    Chief Complaint:  anxiety    History of Present Illness:   History of Present Illness  Pascual Kaur presents via MyChart video visit for medication management. His wife Yandy is also present during portions of visit for collateral information. His last follow up appointment was 3/31/25. Currently taking Prozac 40 mg daily, mirtazapine 15 mg nightly and hydroxyzine pamoate 25 to 50 mg 3 times daily as needed. Voices that he has been doing well overall since last visit, but has been dealing with increased stress at work. Denies experiencing any bothersome symptoms of depression, but does report sleep issues and low energy. Has some days of feeling anxious, nervous or on edge. Says that he has noticed that mirtazapine has helped decrease over thinking and has improved quality of sleep that has resulted in feeling less fatigued during the day. Having more anxiety/panic that occurs when lying down at night with symptoms that tend to subside after about 15 minutes. Able to sleep about 10 hours on average each night. Denies SI/HI. Denies SI/HI.PHQ-9 total score: 2, ANDREW-7 total score: 1.    Previous Psych Meds: Lexapro (ineffective)     The following portions of the patient's history were reviewed and updated as appropriate: allergies, current medications, past family history, past medical history, past social history, past surgical history and problem list.    Past Medical History:   Diagnosis Date    Anxiety     Arthritis     Rheumatoid    Bronchitis     Cataract     Cataract surgery 2 years ago in both eyes    COPD  "(chronic obstructive pulmonary disease)     Diabetes mellitus type I     Elevated liver enzymes     Chronic elevated liver enzymes with history of elevated alkaline phosphate as well as ALT and AST.    Fatigue     Generalized fatigue, nondescript, not new, not better with exercise, not worse with exercise, not better with rest    GERD (gastroesophageal reflux disease)     History of chemotherapy     Received 1 course of adjuvant carboplatin and Taxotere. Then we started carbo.Taxol off a 3 on, 1 off weekly regimen but he did not tolerate either of those and subsequentlly refused further adjuvant therapy. On follow-up scanning since that time.    History of CT scan of chest 06/19/2014    CT of chest noncontrast showed no evidence of recurrence. There was stable diffuse scarring in the right middle and upper lung consistent with postoperative changes and stable obstructive emphysematous changes.    History of MRI     MRI of brain negative    Impaired functional mobility, balance, gait, and endurance     Kidney stone     Lung cancer 05/2011    Stage IIA, T2N1, non-small cell lung cancer, status post wedge resection of a 3 cm primary, level 10 node positive, preop PET negative, and MRI of brain negative lung cancer. Ineligible for our MORAB trial due to the wedge resection.  Diagnosis was in May 2011. - No recurrence.    Pneumonia     As a small child    Sinusitis     Stress     regarding his occupation    Type 2 diabetes mellitus with hyperglycemia, with long-term current use of insulin 02/04/2021    Visual impairment     Diabetic retinopathy     Social History     Socioeconomic History    Marital status:    Tobacco Use    Smoking status: Former     Current packs/day: 0.00     Average packs/day: 3.0 packs/day for 37.0 years (111.0 ttl pk-yrs)     Types: Cigarettes     Start date: 1/1/1973     Quit date: 1/1/2010     Years since quitting: 15.5    Smokeless tobacco: Never    Tobacco comments:     \"He denies " "smoking.\"   Vaping Use    Vaping status: Never Used   Substance and Sexual Activity    Alcohol use: No    Drug use: No    Sexual activity: Defer     Comment: .     Family History   Problem Relation Age of Onset    Diabetes Mother     Hashimoto's thyroiditis Sister      Past Surgical History:   Procedure Laterality Date    CATARACT EXTRACTION  03/08/2021    CATARACT EXTRACTION  04/06/2021    CHOLECYSTECTOMY      EYE SURGERY Right     EYE SURGERY Left     KIDNEY STONE SURGERY      LUNG CANCER SURGERY      TONSILLECTOMY      VITRECTOMY Right 2023    Right eye     Patient Active Problem List   Diagnosis    Chronic obstructive pulmonary disease    Malignant neoplasm of upper lobe of right lung    Type 2 diabetes mellitus with hyperglycemia, with long-term current use of insulin    Rheumatoid arthritis involving multiple sites with positive rheumatoid factor    Reactive depression    Dizziness     Allergies   Allergen Reactions    Iodine GI Intolerance    Other Other (See Comments) and GI Intolerance     * IVP Dye    * IVP Dye * Nausea/Vomiting      Current Outpatient Medications   Medication Sig Dispense Refill    FLUoxetine (PROzac) 40 MG capsule Take 1 capsule by mouth Daily. 90 capsule 1    mirtazapine (REMERON) 15 MG tablet Take 1 tablet by mouth Every Night. 90 tablet 1    albuterol sulfate  (90 Base) MCG/ACT inhaler Inhale 2 puffs by mouth Every 4 (Four) Hours As Needed for Wheezing. 8.5 g 2    amoxicillin-clavulanate (AUGMENTIN) 875-125 MG per tablet Take 1 tablet by mouth 2 (Two) Times a Day. 14 tablet 0    aspirin 81 MG chewable tablet Chew 1 tablet Daily.      atorvastatin (LIPITOR) 20 MG tablet Take 1 tablet by mouth Daily. 90 tablet 1    carboxymethylcellulose sod 1 % gel eye gel Administer 1 drop to both eyes Daily As Needed (Dr eyes).      Continuous Glucose Sensor (FreeStyle Anthony 3 Plus Sensor) Change every 15 days 2 each 5    Dulaglutide (Trulicity) 0.75 MG/0.5ML solution auto-injector " Inject 0.75 mg under the skin into the appropriate area as directed 1 (One) Time Per Week. 2 mL 5    fluticasone (Flonase) 50 MCG/ACT nasal spray Instill 2 sprays into each nostril daily. 16 g 5    folic acid (FOLVITE) 1 MG tablet Take 1 tablet by mouth Daily. 30 tablet 3    folic acid (FOLVITE) 1 MG tablet Take 1 tablet by mouth Daily. 30 tablet 3    folic acid (FOLVITE) 1 MG tablet Take 1 tablet by mouth Daily. 30 tablet 3    glucose blood (Prodigy No Coding Blood Gluc) test strip Test glucose three times daily for diabetes 300 each 0    glucose blood test strip Use as directed 3-4 times a day (Patient taking differently: Use as directed 3-4 times a day) 100 each 5    Homeopathic Products (ALLERGY MEDICINE PO) Take 1 tablet by mouth Daily.      hydroxychloroquine (Plaquenil) 200 MG tablet Take 1 tablet by mouth Daily. 30 tablet 3    hydrOXYzine pamoate (VISTARIL) 25 MG capsule Take 1-2 capsules by mouth 3 (Three) Times a Day As Needed for Anxiety. 180 capsule 2    insulin aspart (NovoLOG FlexPen) 100 UNIT/ML solution pen-injector sc pen Inject as directed by correctional scale, MDD 20 units 15 mL 5    insulin detemir (Levemir FlexTouch) 100 UNIT/ML injection Inject 30 Units under the skin into the appropriate area as directed 2 (Two) Times a Day. 30 mL 5    methotrexate 2.5 MG tablet Take 10 tablets by mouth 1 (One) Time Per Week. 40 tablet 4    omeprazole (priLOSEC) 40 MG capsule Take 1 capsule by mouth Daily. 90 capsule 3    predniSONE (DELTASONE) 10 MG tablet Take 1 Tablet by mouth Daily for 2 - 5 days as needed for a flare up, may repeat once a week 30 tablet 4    predniSONE (DELTASONE) 10 MG tablet Take 1 tablet by mouth Daily for 2-5 days as needed for flare ups. May repeat once a week as needed. 30 tablet 4    predniSONE (DELTASONE) 10 MG tablet Take 1 Tablet by mouth Daily for 2 - 5 days as needed for a flare up, may repeat once a week 30 tablet 4    promethazine-dextromethorphan (PROMETHAZINE-DM) 6.25-15  MG/5ML syrup Take 5 mL by mouth 4 (Four) Times a Day As Needed for Cough. 150 mL 0    Rinvoq 15 MG tablet sustained-release 24 hour Take 1 tablet by mouth Every Morning.      valsartan (Diovan) 40 MG tablet Take 1 tablet by mouth Daily. 90 tablet 3    vitamin D (ERGOCALCIFEROL) 1.25 MG (82865 UT) capsule capsule Take 1 capsule by mouth 1 (One) Time Per Week. 4 capsule 3     No current facility-administered medications for this visit.     Review of Systems   Constitutional:  Negative for activity change, appetite change, unexpected weight gain and unexpected weight loss.   Respiratory:  Negative for shortness of breath.    Cardiovascular:  Negative for chest pain.   Psychiatric/Behavioral:  Positive for sleep disturbance and stress. Negative for suicidal ideas and depressed mood. The patient is nervous/anxious.      Physical Exam  Constitutional:       General: He is not in acute distress.     Appearance: Normal appearance.   Neurological:      Mental Status: He is alert.       Vitals:   The patient was seen remotely today via a MyChart Video Visit through [facility]. Unable to obtain vital signs due to nature of remote visit.    Mental Status Exam:   Hygiene:   appears good  Cooperation:  Cooperative  Eye Contact:  ELVI r/t video visit  Psychomotor Behavior:  Appropriate  Affect:  Full range and Appropriate  Mood: normal  Hopelessness: Denies  Speech:  Normal  Thought Process:  Goal directed and Linear  Thought Content:  Mood congruent  Suicidal:  None  Homicidal:  None  Hallucinations:  None  Delusion:  None  Memory:  Intact  Orientation:  Person, Place, Time, and Situation  Reliability:  good  Insight:  Good  Judgement:  Good  Impulse Control:  Good    Assessment & Plan   Problems Addressed this Visit    None  Visit Diagnoses         Anxiety    -  Primary    Relevant Medications    mirtazapine (REMERON) 15 MG tablet    FLUoxetine (PROzac) 40 MG capsule      Panic attacks        Relevant Medications    mirtazapine  (REMERON) 15 MG tablet    FLUoxetine (PROzac) 40 MG capsule          Diagnoses         Codes Comments      Anxiety    -  Primary ICD-10-CM: F41.9  ICD-9-CM: 300.00       Panic attacks     ICD-10-CM: F41.0  ICD-9-CM: 300.01           Visit Diagnoses:    ICD-10-CM ICD-9-CM   1. Anxiety  F41.9 300.00   2. Panic attacks  F41.0 300.01     Today's visit is for medication management. He has experienced some increased stress at work that he feels has been manageable. Has noticed feeling less fatigued and improvement in sleep quality. Most anxiety occurs at night when lying down that typically subsides after a short time. His wife is able to provide some comfort that helps with managing symptoms. Voices that he is happy with medication and feels that current regimen is adequately managing overall symptoms. Will continue Prozac 40 mg daily, Mirtazapine 15 mg nightly and hydroxyzine 25-50 mg 3 times daily as needed.     -Refill Prozac 40 mg daily  -Refill Mirtazapine 15 mg nightly   -Continue Hydroxyzine Pamoate 25-50 mg 3 times daily as needed for anxiety/panic episodes.    -Reviewed previous available documentation and most recent available labs.   MAKENZIE reviewed and is appropriate.     GOALS:  Short Term Goals: Patient will be compliant with medication, and patient will have no significant medication related side effects.  Patient will be engaged in psychotherapy as indicated.  Patient will report subjective improvement of symptoms.  Long term goals: To stabilize mood and treat/improve subjective symptoms, the patient will stay out of the hospital, the patient will be at an optimal level of functioning, and the patient will take all medications as prescribed.  The patient/guardian verbalized understanding and agreement with goals that were mutually set.    TREATMENT PLAN: Continue supportive psychotherapy efforts and medications as indicated for patient's diagnosis.  Pharmacological and Non-Pharmacological treatment  options discussed during today's visit. Patient/Guardian acknowledged and verbally consented with current treatment plan and was educated on the importance of compliance with treatment and follow-up appointments.      MEDICATION ISSUES:  Discussed medication options and treatment plan of prescribed medication as well as the risks, benefits, any black box warnings, and side effects including potential falls, possible impaired driving, and metabolic adversities among others. Patient is agreeable to call the office with any worsening of symptoms or onset of side effects, or if any concerns or questions arise.  The contact information for the office is made available to the patient. Patient is agreeable to call 911 or go to the nearest ER should they begin having any SI/HI, or if any urgent concerns arise. No medication side effects or related complaints today.     MEDS ORDERED DURING VISIT:  New Medications Ordered This Visit   Medications    mirtazapine (REMERON) 15 MG tablet     Sig: Take 1 tablet by mouth Every Night.     Dispense:  90 tablet     Refill:  1    FLUoxetine (PROzac) 40 MG capsule     Sig: Take 1 capsule by mouth Daily.     Dispense:  90 capsule     Refill:  1     FOLLOW UP:  Return in about 3 months (around 10/14/2025) for Recheck, Video visit.             This document has been electronically signed by TIMOTHY Garnett  July 28, 2025 23:39 EDT    Please note that portions of this note were completed with a voice recognition program. Efforts were made to edit dictation, but occasionally words are mistranscribed.

## 2025-08-07 RX ORDER — HYDROCHLOROTHIAZIDE 12.5 MG/1
CAPSULE ORAL
Qty: 2 EACH | Refills: 5 | Status: SHIPPED | OUTPATIENT
Start: 2025-08-07

## 2025-08-18 ENCOUNTER — PRIOR AUTHORIZATION (OUTPATIENT)
Dept: ENDOCRINOLOGY | Facility: CLINIC | Age: 70
End: 2025-08-18
Payer: COMMERCIAL